# Patient Record
Sex: MALE | Race: WHITE | NOT HISPANIC OR LATINO | Employment: FULL TIME | URBAN - METROPOLITAN AREA
[De-identification: names, ages, dates, MRNs, and addresses within clinical notes are randomized per-mention and may not be internally consistent; named-entity substitution may affect disease eponyms.]

---

## 2019-06-04 ENCOUNTER — TRANSFERRED RECORDS (OUTPATIENT)
Dept: HEALTH INFORMATION MANAGEMENT | Facility: CLINIC | Age: 44
End: 2019-06-04

## 2020-02-23 ENCOUNTER — HEALTH MAINTENANCE LETTER (OUTPATIENT)
Age: 45
End: 2020-02-23

## 2020-12-12 ENCOUNTER — HEALTH MAINTENANCE LETTER (OUTPATIENT)
Age: 45
End: 2020-12-12

## 2021-04-07 ENCOUNTER — OFFICE VISIT (OUTPATIENT)
Dept: FAMILY MEDICINE | Facility: CLINIC | Age: 46
End: 2021-04-07
Payer: COMMERCIAL

## 2021-04-07 VITALS
RESPIRATION RATE: 18 BRPM | OXYGEN SATURATION: 100 % | WEIGHT: 230 LBS | HEIGHT: 72 IN | SYSTOLIC BLOOD PRESSURE: 121 MMHG | TEMPERATURE: 98 F | DIASTOLIC BLOOD PRESSURE: 77 MMHG | HEART RATE: 77 BPM | BODY MASS INDEX: 31.15 KG/M2

## 2021-04-07 DIAGNOSIS — Z11.4 SCREENING FOR HIV (HUMAN IMMUNODEFICIENCY VIRUS): ICD-10-CM

## 2021-04-07 DIAGNOSIS — E78.5 HYPERLIPIDEMIA WITH TARGET LDL LESS THAN 160: Primary | ICD-10-CM

## 2021-04-07 DIAGNOSIS — Z00.01 ENCOUNTER FOR ROUTINE ADULT PHYSICAL EXAM WITH ABNORMAL FINDINGS: ICD-10-CM

## 2021-04-07 DIAGNOSIS — Z11.59 ENCOUNTER FOR HEPATITIS C SCREENING TEST FOR LOW RISK PATIENT: ICD-10-CM

## 2021-04-07 DIAGNOSIS — Z12.11 SCREEN FOR COLON CANCER: ICD-10-CM

## 2021-04-07 DIAGNOSIS — F41.9 ANXIETY: ICD-10-CM

## 2021-04-07 PROBLEM — Z00.00 ROUTINE HISTORY AND PHYSICAL EXAMINATION OF ADULT: Status: ACTIVE | Noted: 2021-04-07

## 2021-04-07 LAB
ANION GAP SERPL CALCULATED.3IONS-SCNC: 7 MMOL/L (ref 3–14)
BUN SERPL-MCNC: 13 MG/DL (ref 7–30)
CALCIUM SERPL-MCNC: 9.1 MG/DL (ref 8.5–10.1)
CHLORIDE SERPL-SCNC: 104 MMOL/L (ref 94–109)
CHOLEST SERPL-MCNC: 259 MG/DL
CO2 SERPL-SCNC: 25 MMOL/L (ref 20–32)
CREAT SERPL-MCNC: 1.05 MG/DL (ref 0.66–1.25)
ERYTHROCYTE [DISTWIDTH] IN BLOOD BY AUTOMATED COUNT: 13.3 % (ref 10–15)
GFR SERPL CREATININE-BSD FRML MDRD: 84 ML/MIN/{1.73_M2}
GLUCOSE SERPL-MCNC: 92 MG/DL (ref 70–99)
HCT VFR BLD AUTO: 48.6 % (ref 40–53)
HDLC SERPL-MCNC: 63 MG/DL
HGB BLD-MCNC: 16.8 G/DL (ref 13.3–17.7)
LDLC SERPL CALC-MCNC: 177 MG/DL
MCH RBC QN AUTO: 32.6 PG (ref 26.5–33)
MCHC RBC AUTO-ENTMCNC: 34.6 G/DL (ref 31.5–36.5)
MCV RBC AUTO: 94 FL (ref 78–100)
NONHDLC SERPL-MCNC: 196 MG/DL
PLATELET # BLD AUTO: 144 10E9/L (ref 150–450)
POTASSIUM SERPL-SCNC: 4.3 MMOL/L (ref 3.4–5.3)
RBC # BLD AUTO: 5.15 10E12/L (ref 4.4–5.9)
SODIUM SERPL-SCNC: 136 MMOL/L (ref 133–144)
TRIGL SERPL-MCNC: 94 MG/DL
TSH SERPL DL<=0.005 MIU/L-ACNC: 0.81 MU/L (ref 0.4–4)
WBC # BLD AUTO: 5.4 10E9/L (ref 4–11)

## 2021-04-07 PROCEDURE — 36415 COLL VENOUS BLD VENIPUNCTURE: CPT | Performed by: FAMILY MEDICINE

## 2021-04-07 PROCEDURE — 84443 ASSAY THYROID STIM HORMONE: CPT | Performed by: FAMILY MEDICINE

## 2021-04-07 PROCEDURE — 86803 HEPATITIS C AB TEST: CPT | Performed by: FAMILY MEDICINE

## 2021-04-07 PROCEDURE — 80048 BASIC METABOLIC PNL TOTAL CA: CPT | Performed by: FAMILY MEDICINE

## 2021-04-07 PROCEDURE — 85027 COMPLETE CBC AUTOMATED: CPT | Performed by: FAMILY MEDICINE

## 2021-04-07 PROCEDURE — 87389 HIV-1 AG W/HIV-1&-2 AB AG IA: CPT | Performed by: FAMILY MEDICINE

## 2021-04-07 PROCEDURE — 99213 OFFICE O/P EST LOW 20 MIN: CPT | Mod: 25 | Performed by: FAMILY MEDICINE

## 2021-04-07 PROCEDURE — 80061 LIPID PANEL: CPT | Performed by: FAMILY MEDICINE

## 2021-04-07 PROCEDURE — 99386 PREV VISIT NEW AGE 40-64: CPT | Performed by: FAMILY MEDICINE

## 2021-04-07 SDOH — ECONOMIC STABILITY: FOOD INSECURITY: WITHIN THE PAST 12 MONTHS, YOU WORRIED THAT YOUR FOOD WOULD RUN OUT BEFORE YOU GOT MONEY TO BUY MORE.: NOT ASKED

## 2021-04-07 SDOH — ECONOMIC STABILITY: TRANSPORTATION INSECURITY
IN THE PAST 12 MONTHS, HAS THE LACK OF TRANSPORTATION KEPT YOU FROM MEDICAL APPOINTMENTS OR FROM GETTING MEDICATIONS?: NOT ASKED

## 2021-04-07 SDOH — ECONOMIC STABILITY: TRANSPORTATION INSECURITY
IN THE PAST 12 MONTHS, HAS LACK OF TRANSPORTATION KEPT YOU FROM MEETINGS, WORK, OR FROM GETTING THINGS NEEDED FOR DAILY LIVING?: NOT ASKED

## 2021-04-07 SDOH — ECONOMIC STABILITY: INCOME INSECURITY: HOW HARD IS IT FOR YOU TO PAY FOR THE VERY BASICS LIKE FOOD, HOUSING, MEDICAL CARE, AND HEATING?: NOT ASKED

## 2021-04-07 SDOH — ECONOMIC STABILITY: FOOD INSECURITY: WITHIN THE PAST 12 MONTHS, THE FOOD YOU BOUGHT JUST DIDN'T LAST AND YOU DIDN'T HAVE MONEY TO GET MORE.: NOT ASKED

## 2021-04-07 ASSESSMENT — MIFFLIN-ST. JEOR: SCORE: 1959.45

## 2021-04-07 ASSESSMENT — PAIN SCALES - GENERAL: PAINLEVEL: NO PAIN (0)

## 2021-04-07 NOTE — PATIENT INSTRUCTIONS
Patient Education     Treating Anxiety Disorders with Medicine  An anxiety disorder can make you feel nervous or apprehensive, even without a clear reason. In people age 65 and older, generalized anxiety disorder is one of the most commonly diagnosed anxiety disorders. Many times it occurs with depression. Certain anxiety disorders can cause intense feelings of fear or panic. You may even have physical symptoms such as a racing heartbeat, sweating, or dizziness. If you have these feelings, you don t have to suffer anymore. Treatment to help you overcome your fears will likely include therapy (also called counseling). Medicine may also be prescribed to help control your symptoms.     Medicines  Certain medicines may be prescribed to help control your symptoms. So you may feel less anxious. You may also feel able to move forward with therapy. At first, medicines and dosages may need to be adjusted to find what works best for you. Try to be patient. Tell your healthcare provider how a medicine makes you feel. This way, you can work together to find the treatment that s best for you. Keep in mind that medicines can have side effects. Talk with your provider about any side effects that are bothering you. Changing the dose or type of medicine may help. Don t stop taking medicine on your own. That can cause symptoms to come back or cause dangerous withdrawal symptoms.     Anti-anxiety medicine. This medicine eases symptoms and helps you relax. Your healthcare provider will explain when and how to use it. It may be prescribed for use before situations that make you anxious. You may also be told to take medicine on a regular schedule. Anti-anxiety medicine may make you feel a little sleepy or  out of it.  Don t drive a car or operate machinery while on this medicine, until you know how it affects you.  Never use alcohol or other drugs with anti-anxiety medicines. This could result in loss of muscular control, sedation,  coma, or death. Also, use only the amount of medicine prescribed for you. If you think you may have taken too much, get emergency care right away. Never share your medicines with others. Store these medicines in a safe place that can't be reached by children or visitors.   Keep taking medicines as prescribed  Never change your dosage, share or use another person's medicine, or stop taking your medicines without talking to your healthcare provider first. Keep the following in mind:     Some medicines must be taken on a schedule. Make this part of your daily routine. For instance, always take your pill before brushing your teeth. A pillbox can help you remember if you ve taken your medicine each day.    Medicines are often taken for 6 to 12 months. Your healthcare provider will then evaluate whether you need to stay on them. Many people who have also had therapy may no longer need medicine to manage anxiety.    You may need to stop taking medicine slowly to give your body time to adjust. When it s time to stop, your healthcare provider will tell you more. Remember: Never stop taking your medicine without talking to your provider first.    If symptoms return, you may need to start taking medicines again.  This isn t your fault. It s just the nature of your anxiety disorder.  What to think about    Side effects. Medicines may cause side effects. Ask your healthcare provider or pharmacist what you can expect. They may have ideas for avoiding some side effects.    Sexual problems. Some antidepressants can affect your desire for sex or your ability to have an orgasm. A change in dosage or medicine often solves the problem. If you have a sexual side effect that concerns you, tell your healthcare provider.    Addiction. If you ve never had a problem with drugs or alcohol, you may not have a problem with medicines used to treat anxiety disorders. But always discuss the medicines with your healthcare provider before taking  them. If you have a history of addiction, you may not be able to use certain medicines used to treat anxiety disorders.    Medicine interactions. Always check with your pharmacist before using any over-the-counter medicines (OTCs), including herbal supplements. Some OTCs may interact with your anti-anxiety medicines and increase or decrease their effectiveness.    [employer] last reviewed this educational content on 12/1/2019 2000-2020 [employer]. All rights reserved. This information is not intended as a substitute for professional medical care. Always follow your healthcare professional's instructions.           Patient Education     Anxiety Reaction  Anxiety is the feeling we all get when we think something bad might happen. It is a normal response to stress and normally causes only a mild reaction. When anxiety becomes more severe, it can interfere with daily life. In some cases, you may not even be aware of what you re anxious about. There may also be a genetic link. Or it may be a learned behavior in the home.   Both psychological and physical triggers cause stress reaction. It's often a response to fear or emotional stress, real or imagined. This stress may come from home, family, work, or social relationships.   During an anxiety reaction, you may feel:    Helpless    Nervous    Depressed    Grouchy  Your body may show signs of anxiety in many ways. You may experience:    Dry mouth    Shakiness    Dizziness    Weakness    Trouble breathing    Breathing fast (hyperventilating)    Chest pressure    Sweating    Headache    Nausea    Diarrhea    Tiredness    Inability to sleep    Sexual problems  Home care    Try to find the sources of stress in your life. They may not be obvious. These may include:  ? Daily hassles of life (such as traffic jams, missed appointments, or car troubles)  ? Major life changes, both good (new baby or job promotion) and bad (loss of job or loss of loved  one)  ? Overload (feeling that you have too many responsibilities and can't take care of all of them at once)  ? Feeling helpless or feeling that your problems can't be solved    Notice how your body reacts to stress. Learn to listen to your body signals. This will help you take action before the stress becomes severe.    When you can, do something about the source of your stress. (Avoid hassles, limit the amount of change that happens in your life at one time, and take a break when you feel overloaded).    Unfortunately, many stressful situations can't be avoided. It is necessary to learn how to better manage stress. There are many proven methods that will reduce your anxiety. These include simple things such as exercise, good nutrition, and adequate rest. Also, there are certain techniques that are helpful:  ? Relaxation  ? Breathing exercises  ? Visualization  ? Biofeedback  ? Meditation  For more information about this, talk with your healthcare provider. Or check online or at your local library or bookstore. You'll find many books and audiobooks on this subject.   Follow-up care  If you feel your anxiety is not responding to self-help measures, call your healthcare provider or make an appointment with a counselor. You may need short-term psychological counseling or medicine to help you manage stress.   Call 911  Call 911 if any of these happen:     Trouble breathing    Confusion    Drowsiness or trouble waking up    Fainting or loss of consciousness    Rapid heart rate    Seizure    New chest pain that becomes more severe, lasts longer, or spreads into your shoulder, arm, neck, jaw, or back  When to get medical advice  Call your healthcare provider right away if any of these happen:    Your symptoms get worse    Severe headache not eased by rest and mild pain reliever  [employer] last reviewed this educational content on 4/1/2020 2000-2020 [employer]. All rights reserved. This information is not  intended as a substitute for professional medical care. Always follow your healthcare professional's instructions.           Patient Education     Zoloft Oral Tablet 50 mg   Uses  This medicine is used for the following purposes:    anxiety    depression    eating disorders    obsessive compulsive disorder    post-traumatic stress disorder  Instructions  This medicine may be taken with or without food.  It is very important that you take the medicine at about the same time every day. It will work best if you do this.  Keep the medicine at room temperature. Avoid heat and direct light.  It is important that you keep taking each dose of this medicine on time even if you are feeling well.  If you forget to take a dose on time, take it as soon as you remember. If it is almost time for the next dose, do not take the missed dose. Return to your normal dosing schedule. Do not take 2 doses of this medicine at one time.  Please tell your doctor and pharmacist about all the medicines you take. Include both prescription and over-the-counter medicines. Also tell them about any vitamins, herbal medicines, or anything else you take for your health.  Do not suddenly stop taking this medicine. Check with your doctor before stopping.  Cautions  Tell your doctor and pharmacist if you ever had an allergic reaction to a medicine. Symptoms of an allergic reaction can include trouble breathing, skin rash, itching, swelling, or severe dizziness.  Do not use the medication any more than instructed.  Your ability to stay alert or to react quickly may be impaired by this medicine. Do not drive or operate machinery until you know how this medicine will affect you.  Please check with your doctor before drinking alcohol while on this medicine.  Family should check on the patient often. Call the doctor if patient becomes more depressed, has thoughts of suicide, or shows changes in behavior.  Call the doctor if there are any signs of confusion or  unusual changes in behavior.  Tell the doctor or pharmacist if you are pregnant, planning to be pregnant, or breastfeeding.  Ask your pharmacist if this medicine can interact with any of your other medicines. Be sure to tell them about all the medicines you take.  Do not start or stop any other medicines without first speaking to your doctor or pharmacist.  Call your doctor right away if you notice any unusual bleeding or bruising.  If you have painful erection or an erection for more than 4 hours, seek medical care right away.  Do not share this medicine with anyone who has not been prescribed this medicine.  This medicine can cause serious side effects in some patients. Important information from the [place] Food and Drug Administration (FDA) is available from your pharmacist. Please review it carefully with your pharmacist to understand the risks associated with this medicine.  Side Effects  The following is a list of some common side effects from this medicine. Please speak with your doctor about what you should do if you experience these or other side effects.    agitated feeling or trouble sleeping    decreased appetite    diarrhea    dizziness    drowsiness or sedation    dry mouth    nausea    stomach upset or abdominal pain    sweating    weight loss  Call your doctor or get medical help right away if you notice any of these more serious side effects:    loss of balance    coughing up blood or vomit that looks like coffee grounds    pain in the eye    fainting    hallucinations (unusual thoughts, seeing or hearing things that are not real)    fast or irregular heart beats    muscle weakness    dilation of the pupils    restlessness    problems with sexual functions or desire    shakiness    dark, tarry stool    blurring or changes of vision    severe or persistent vomiting    unexpected or extreme weight loss  A few people may have an allergic reactions to this medicine. Symptoms can include difficulty  breathing, skin rash, itching, swelling, or severe dizziness. If you notice any of these symptoms, seek medical help quickly.  Extra  Please speak with your doctor, nurse, or pharmacist if you have any questions about this medicine.  [email or web address]  IMPORTANT NOTE: This document tells you briefly how to take your medicine, but it does not tell you all there is to know about it.Your doctor or pharmacist may give you other documents about your medicine. Please talk to them if you have any questions.Always follow their advice. There is a more complete description of this medicine available in English.Scan this code on your smartphone or tablet or use the web address below. You can also ask your pharmacist for a printout. If you have any questions, please ask your pharmacist.     2021 [employer].

## 2021-04-07 NOTE — PROGRESS NOTES
SUBJECTIVE:   CC: Gagandeep Oswald is an 46 year old male who presents for preventative health visit.       Patient has been advised of split billing requirements and indicates understanding: Yes  Healthy Habits:    Getting at least 3 servings of Calcium per day:  Yes    Bi-annual eye exam:  Yes    Dental care twice a year:  Yes    Sleep apnea or symptoms of sleep apnea:  Excessive snoring    Diet:  Regular (no restrictions)    Frequency of exercise:  4-5 days/week    Duration of exercise:  30-45 minutes    Taking medications regularly:  Not Applicable    PHQ-2 Total Score:    Additional concerns today:  Yes (anxiety)    Today's PHQ-2 Score:   PHQ-2 (  [employer]) 2021   Q1: Little interest or pleasure in doing things 0   Q2: Feeling down, depressed or hopeless 0   PHQ-2 Score 0       Abuse: Current or Past(Physical, Sexual or Emotional)- No  Do you feel safe in your environment? Yes    Have you ever done Advance Care Planning? (For example, a Health Directive, POLST, or a discussion with a medical provider or your loved ones about your wishes): No, advance care planning information given to patient to review.  Patient declined advance care planning discussion at this time.    Social History     Tobacco Use     Smoking status: Former Smoker     Packs/day: 0.50     Years: 3.00     Pack years: 1.50     Types: Cigarettes     Quit date: 2005     Years since quittin.2     Smokeless tobacco: Never Used   Substance Use Topics     Alcohol use: Yes     Alcohol/week: 8.3 standard drinks     Comment: 5 drinks a week     If you drink alcohol do you typically have >3 drinks per day or >7 drinks per week? Yes      Alcohol Use 2021   Prescreen: >3 drinks/day or >7 drinks/week? Yes     AUDIT - Alcohol Use Disorders Identification Test - Reproduced from the World Health Organization Audit 2001 (Second Edition) 2021   1.  How often do you have a drink containing alcohol? 4 or more times a week   2.  How many  drinks containing alcohol do you have on a typical day when you are drinking? 1 or 2   3.  How often do you have five or more drinks on one occasion? Never   4.  How often during the last year have you found that you were not able to stop drinking once you had started? Never   5.  How often during the last year have you failed to do what was normally expected of you because of drinking? Never   6.  How often during the last year have you needed a first drink in the morning to get yourself going after a heavy drinking session? Never   7.  How often during the last year have you had a feeling of guilt or remorse after drinking? Never   8.  How often during the last year have you been unable to remember what happened the night before because of your drinking? Never   9.  Have you or someone else been injured because of your drinking? No   10. Has a relative, friend, doctor or other health care worker been concerned about your drinking or suggested you cut down? No   TOTAL SCORE 4       Last PSA: No results found for: PSA    Reviewed orders with patient. Reviewed health maintenance and updated orders accordingly - Yes  Lab work is in process  Labs reviewed in EPIC  BP Readings from Last 3 Encounters:   04/07/21 121/77   11/11/16 132/80   05/13/16 114/74    Wt Readings from Last 3 Encounters:   04/07/21 104.3 kg (230 lb)   11/11/16 100.7 kg (222 lb)   05/13/16 95.3 kg (210 lb)                  Patient Active Problem List   Diagnosis     Hyperlipidemia with target LDL less than 160     History of sinus surgery     Past Surgical History:   Procedure Laterality Date     ENDOSCOPIC SINUS SURGERY  1/24/2013    Procedure: ENDOSCOPIC SINUS SURGERY;  Bilateral total ethmoidectomy, bilateral endoscopic maxillary antrostomy;  Surgeon: Han Toledo MD;  Location: [facility] OR     MOUTH SURGERY  6/2012    [place] Teeth Removed     VASECTOMY         Social History     Tobacco Use     Smoking status: Former Smoker     Packs/day: 0.50      Years: 3.00     Pack years: 1.50     Types: Cigarettes     Quit date: 2005     Years since quittin.2     Smokeless tobacco: Never Used   Substance Use Topics     Alcohol use: Yes     Alcohol/week: 8.3 standard drinks     Comment: 5 drinks a week     Family History   Problem Relation Age of Onset     Allergies Mother      Arthritis Mother      Breast Cancer Mother 71     Diabetes Mother      Obesity Mother      Pancreatic Cancer Mother         d age 80     Obesity Brother      Diabetes Brother      C.A.D. Father 82        MI     Cancer Father         skin     Prostate Cancer Father      Kidney Cancer Father         d age 85     Colon Cancer No family hx of          Current Outpatient Medications   Medication Sig Dispense Refill     sertraline (ZOLOFT) 50 MG tablet 25 mg daily x 1 week and then 1 tablet daily 30 tablet 0     Multiple Vitamin (MULTIVITAMINS PO) Take  by mouth.       No Known Allergies    Reviewed and updated as needed this visit by clinical staff  Tobacco  Allergies   Problems               Abnormal Mood Symptoms  Onset/Duration: always bur worse 1 month  Description:   Depression (if yes, do PHQ-9): no  Anxiety (if yes, do JAZMINE-7): no  Accompanying Signs & Symptoms:  Still participating in activities that you used to enjoy: YES  Fatigue: no  Irritability: no  Difficulty concentrating: at times  Changes in appetite: no  Problems with sleep: no  Heart racing/beating fast: occasional when he gets panic attacks  Abnormally elevated, expansive, or irritable mood: no  Persistently increased activity or energy: no  Thoughts of hurting yourself or others: no  History:  Recent stress or major life event: no  Prior depression or anxiety: anxiety  Family history of depression or anxiety: no  Alcohol/drug use: 2 beers a day  Difficulty sleeping: no  Precipitating or alleviating factors: None  Therapies tried and outcome: none  No flowsheet data found.  No flowsheet data found.      Reviewed and  "updated as needed this visit by Provider     Problems            Past Medical History:   Diagnosis Date     Facial nerve palsy     LT congenital      Hyperlipidemia LDL goal < 160 5/7/2012     Nephrolithiasis     has had it 3 times-last one was in 2016     Varicocele     LT      Past Surgical History:   Procedure Laterality Date     ENDOSCOPIC SINUS SURGERY  1/24/2013    Procedure: ENDOSCOPIC SINUS SURGERY;  Bilateral total ethmoidectomy, bilateral endoscopic maxillary antrostomy;  Surgeon: Han Toledo MD;  Location: [facility] OR     MOUTH SURGERY  6/2012    [place] Teeth Removed     VASECTOMY         Review of Systems  CONSTITUTIONAL: NEGATIVE for fever, chills, change in weight  INTEGUMENTARY/SKIN: NEGATIVE for worrisome rashes, moles or lesions  EYES: NEGATIVE for vision changes or irritation  ENT: NEGATIVE for ear, mouth and throat problems  RESP: NEGATIVE for significant cough or SOB  CV: NEGATIVE for chest pain, palpitations or peripheral edema  GI: NEGATIVE for nausea, abdominal pain, heartburn, or change in bowel habits   male: negative for dysuria, hematuria, decreased urinary stream, erectile dysfunction, urethral discharge  MUSCULOSKELETAL: NEGATIVE for significant arthralgias or myalgia  NEURO: NEGATIVE for weakness, dizziness or paresthesias  PSYCHIATRIC: NEGATIVE for changes in mood or affect    OBJECTIVE:   /77   Pulse 77   Temp 98  F (36.7  C) (Oral)   Resp 18   Ht 1.834 m (6' 0.2\")   Wt 104.3 kg (230 lb)   SpO2 100%   BMI 31.02 kg/m      Physical Exam  GENERAL: healthy, alert and no distress  EYES: Eyes grossly normal to inspection, PERRL and conjunctivae and sclerae normal  HENT: ear canals and TM's normal, nose and mouth without ulcers or lesions  NECK: no adenopathy, no asymmetry, masses, or scars and thyroid normal to palpation  RESP: lungs clear to auscultation - no rales, rhonchi or wheezes  CV: regular rate and rhythm, normal S1 S2, no S3 or S4, no murmur, click or rub, no " peripheral edema and peripheral pulses strong  ABDOMEN: soft, nontender, no hepatosplenomegaly, no masses and bowel sounds normal  MS: no gross musculoskeletal defects noted, no edema  SKIN: no suspicious lesions or rashes  NEURO: Normal strength and tone, mentation intact and speech normal  PSYCH: mentation appears normal, affect normal/bright    Diagnostic Test Results:  Labs reviewed in Epic  Reviewed     ASSESSMENT/PLAN:   1. Encounter for routine adult physical exam with abnormal findings    - Lipid panel reflex to direct LDL Fasting    2. Hyperlipidemia with target LDL less than 160  Labs pending       3. Anxiety  Discussed Therapy vs meds  Referral done  SEE [facility] care orders  The potential side effects of this medication have been discussed with the patient.  Call if any significant problems with these are experienced.    - TSH with free T4 reflex  - CBC with platelets  - Basic metabolic panel  - MENTAL HEALTH REFERRAL  - Adult; Outpatient Treatment; Individual/Couples/Family/Group Therapy/Health Psychology; [facility] [place]: [facility] [phone]; We will contact you to schedule the appointment or please call with any questions  - sertraline (ZOLOFT) 50 MG tablet; 25 mg daily x 1 week and then 1 tablet daily  Dispense: 30 tablet; Refill: 0  Follow up 3 weeks  If suicidal or agitated stop meds  4. Screen for colon cancer  Advised colonoscopy vs FIT card   Pt to check with Insurance and let me Know  - Fecal colorectal cancer screen (FIT); Future    5. Encounter for hepatitis C screening test for low risk patient  Advised   - **Hepatitis C Screen Reflex to RNA FUTURE anytime    6. Screening for HIV (human immunodeficiency virus)  Advised   - HIV Antigen Antibody Combo    Patient has been advised of split billing requirements and indicates understanding: handouts  COUNSELING:   Reviewed preventive health counseling, as reflected in patient instructions       Consider AAA screening for ages 65-75 and  "smoking history       Regular exercise       Healthy diet/nutrition       Vision screening       Consider Hep C screening for all patients one time for ages 18-79 years       HIV screeninx in teen years, 1x in adult years, and at intervals if high risk       Colon cancer screening    Estimated body mass index is 31.02 kg/m  as calculated from the following:    Height as of this encounter: 1.834 m (6' 0.2\").    Weight as of this encounter: 104.3 kg (230 lb).     Weight management plan: Discussed healthy diet and exercise guidelines    He reports that he quit smoking about 16 years ago. His smoking use included cigarettes. He has a 1.50 pack-year smoking history. He has never used smokeless tobacco.      Counseling Resources:  ATP IV Guidelines  Pooled Cohorts Equation Calculator  FRAX Risk Assessment  ICSI Preventive Guidelines  Dietary Guidelines for Americans, 2010  USDA's MyPlate  ASA Prophylaxis  Lung CA Screening    Claudia Solo MD  [facility]  "

## 2021-04-07 NOTE — LETTER
April 9, 2021      Gagandeep Oswald  [address] [place] [address]        Dear ,    We are writing to inform you of your test results.    The 10-year ASCVD risk score (Oceansideal[employer] MCKENZIE Jr., et al., 2013) is: 2.6%. We have also included a  Low cholesterol diet     Resulted Orders   Lipid panel reflex to direct LDL Fasting   Result Value Ref Range    Cholesterol 259 (H) <200 mg/dL      Comment:      Desirable:       <200 mg/dl    Triglycerides 94 <150 mg/dL      Comment:      Non Fasting    HDL Cholesterol 63 >39 mg/dL    LDL Cholesterol Calculated 177 (H) <100 mg/dL      Comment:      Above desirable:  100-129 mg/dl  Borderline High:  130-159 mg/dL  High:             160-189 mg/dL  Very high:       >189 mg/dl      Non HDL Cholesterol 196 (H) <130 mg/dL      Comment:      Above Desirable:  130-159 mg/dl  Borderline high:  160-189 mg/dl  High:             190-219 mg/dl  Very high:       >219 mg/dl     TSH with free T4 reflex   Result Value Ref Range    TSH 0.81 0.40 - 4.00 mU/L   CBC with platelets   Result Value Ref Range    WBC 5.4 4.0 - 11.0 10e9/L    RBC Count 5.15 4.4 - 5.9 10e12/L    Hemoglobin 16.8 13.3 - 17.7 g/dL    Hematocrit 48.6 40.0 - 53.0 %    MCV 94 78 - 100 fl    MCH 32.6 26.5 - 33.0 pg    MCHC 34.6 31.5 - 36.5 g/dL    RDW 13.3 10.0 - 15.0 %    Platelet Count 144 (L) 150 - 450 10e9/L   Basic metabolic panel   Result Value Ref Range    Sodium 136 133 - 144 mmol/L    Potassium 4.3 3.4 - 5.3 mmol/L    Chloride 104 94 - 109 mmol/L    Carbon Dioxide 25 20 - 32 mmol/L    Anion Gap 7 3 - 14 mmol/L    Glucose 92 70 - 99 mg/dL      Comment:      Non Fasting    Urea Nitrogen 13 7 - 30 mg/dL    Creatinine 1.05 0.66 - 1.25 mg/dL    GFR Estimate 84 >60 mL/min/[1.73_m2]      Comment:      Non  GFR Calc  Starting 12/18/2018, serum creatinine based estimated GFR (eGFR) will be   calculated using the Chronic Kidney Disease Epidemiology Collaboration   (CKD-EPI) equation.      GFR  Estimate If Black >90 >60 mL/min/[1.73_m2]      Comment:       GFR Calc  Starting 12/18/2018, serum creatinine based estimated GFR (eGFR) will be   calculated using the Chronic Kidney Disease Epidemiology Collaboration   (CKD-EPI) equation.      Calcium 9.1 8.5 - 10.1 mg/dL       If you have any questions or concerns, please call the clinic at the number listed above.       Sincerely,      Claudia Solo MD

## 2021-04-08 LAB
HCV AB SERPL QL IA: NONREACTIVE
HIV 1+2 AB+HIV1 P24 AG SERPL QL IA: NONREACTIVE

## 2021-04-16 DIAGNOSIS — Z12.11 SCREEN FOR COLON CANCER: ICD-10-CM

## 2021-04-16 PROCEDURE — 82274 ASSAY TEST FOR BLOOD FECAL: CPT | Performed by: FAMILY MEDICINE

## 2021-04-20 LAB — HEMOCCULT STL QL IA: NEGATIVE

## 2021-05-17 ENCOUNTER — OFFICE VISIT (OUTPATIENT)
Dept: FAMILY MEDICINE | Facility: CLINIC | Age: 46
End: 2021-05-17
Payer: COMMERCIAL

## 2021-05-17 ENCOUNTER — MYC MEDICAL ADVICE (OUTPATIENT)
Dept: FAMILY MEDICINE | Facility: CLINIC | Age: 46
End: 2021-05-17

## 2021-05-17 VITALS
TEMPERATURE: 98.9 F | WEIGHT: 231 LBS | SYSTOLIC BLOOD PRESSURE: 104 MMHG | HEIGHT: 72 IN | BODY MASS INDEX: 31.29 KG/M2 | DIASTOLIC BLOOD PRESSURE: 62 MMHG | OXYGEN SATURATION: 99 % | HEART RATE: 94 BPM

## 2021-05-17 DIAGNOSIS — I48.91 NEW ONSET ATRIAL FIBRILLATION (H): Primary | ICD-10-CM

## 2021-05-17 LAB
ANION GAP SERPL CALCULATED.3IONS-SCNC: 6 MMOL/L (ref 3–14)
BUN SERPL-MCNC: 14 MG/DL (ref 7–30)
CALCIUM SERPL-MCNC: 8.6 MG/DL (ref 8.5–10.1)
CHLORIDE SERPL-SCNC: 111 MMOL/L (ref 94–109)
CO2 SERPL-SCNC: 23 MMOL/L (ref 20–32)
CREAT SERPL-MCNC: 1.07 MG/DL (ref 0.66–1.25)
ERYTHROCYTE [DISTWIDTH] IN BLOOD BY AUTOMATED COUNT: 13.1 % (ref 10–15)
GFR SERPL CREATININE-BSD FRML MDRD: 82 ML/MIN/{1.73_M2}
GLUCOSE SERPL-MCNC: 110 MG/DL (ref 70–99)
HCT VFR BLD AUTO: 47.6 % (ref 40–53)
HGB BLD-MCNC: 16.8 G/DL (ref 13.3–17.7)
MCH RBC QN AUTO: 33.1 PG (ref 26.5–33)
MCHC RBC AUTO-ENTMCNC: 35.3 G/DL (ref 31.5–36.5)
MCV RBC AUTO: 94 FL (ref 78–100)
PLATELET # BLD AUTO: 171 10E9/L (ref 150–450)
POTASSIUM SERPL-SCNC: 4.4 MMOL/L (ref 3.4–5.3)
RBC # BLD AUTO: 5.08 10E12/L (ref 4.4–5.9)
SODIUM SERPL-SCNC: 140 MMOL/L (ref 133–144)
TSH SERPL DL<=0.005 MIU/L-ACNC: 0.87 MU/L (ref 0.4–4)
WBC # BLD AUTO: 7.9 10E9/L (ref 4–11)

## 2021-05-17 PROCEDURE — 99214 OFFICE O/P EST MOD 30 MIN: CPT | Performed by: FAMILY MEDICINE

## 2021-05-17 PROCEDURE — 80048 BASIC METABOLIC PNL TOTAL CA: CPT | Performed by: FAMILY MEDICINE

## 2021-05-17 PROCEDURE — 85027 COMPLETE CBC AUTOMATED: CPT | Performed by: FAMILY MEDICINE

## 2021-05-17 PROCEDURE — 84443 ASSAY THYROID STIM HORMONE: CPT | Performed by: FAMILY MEDICINE

## 2021-05-17 PROCEDURE — 36415 COLL VENOUS BLD VENIPUNCTURE: CPT | Performed by: FAMILY MEDICINE

## 2021-05-17 PROCEDURE — 93000 ELECTROCARDIOGRAM COMPLETE: CPT | Performed by: FAMILY MEDICINE

## 2021-05-17 RX ORDER — METOPROLOL TARTRATE 25 MG/1
12.5 TABLET, FILM COATED ORAL 2 TIMES DAILY
Qty: 60 TABLET | Refills: 0 | Status: SHIPPED | OUTPATIENT
Start: 2021-05-17 | End: 2021-05-24

## 2021-05-17 ASSESSMENT — ANXIETY QUESTIONNAIRES
6. BECOMING EASILY ANNOYED OR IRRITABLE: NOT AT ALL
IF YOU CHECKED OFF ANY PROBLEMS ON THIS QUESTIONNAIRE, HOW DIFFICULT HAVE THESE PROBLEMS MADE IT FOR YOU TO DO YOUR WORK, TAKE CARE OF THINGS AT HOME, OR GET ALONG WITH OTHER PEOPLE: NOT DIFFICULT AT ALL
1. FEELING NERVOUS, ANXIOUS, OR ON EDGE: NOT AT ALL
7. FEELING AFRAID AS IF SOMETHING AWFUL MIGHT HAPPEN: NOT AT ALL
GAD7 TOTAL SCORE: 0
3. WORRYING TOO MUCH ABOUT DIFFERENT THINGS: NOT AT ALL
5. BEING SO RESTLESS THAT IT IS HARD TO SIT STILL: NOT AT ALL
2. NOT BEING ABLE TO STOP OR CONTROL WORRYING: NOT AT ALL

## 2021-05-17 ASSESSMENT — MIFFLIN-ST. JEOR: SCORE: 1960.81

## 2021-05-17 ASSESSMENT — PATIENT HEALTH QUESTIONNAIRE - PHQ9: 5. POOR APPETITE OR OVEREATING: NOT AT ALL

## 2021-05-17 NOTE — PROGRESS NOTES
Assessment & Plan     New onset atrial fibrillation (H)  Pending labs  - CBC with platelets  - Basic metabolic panel  - TSH with free T4 reflex  - CARDIOLOGY EVAL ADULT REFERRAL; Future  - Echocardiogram Complete; Future  Make appointment for Echo and Cardiology  - metoprolol tartrate (LOPRESSOR) 25 MG tablet; Take 0.5 tablets (12.5 mg) by mouth 2 times daily    If any worsening symptoms-Go to ER    No follow-ups on file.    Claudia Solo MD  [facility] JEFFREY Donis is a 46 year old who presents for the following health issues    Concern - Heart Flutter-used apple watch  Which showed atrial Fib  Pt  Has  had palpitations and Feels His Heart skips a Beat-This is Off   And on for several years  Pt has Noticed this for several years but previous EKG from 2019 showed Sinus Rhythm  Onset: woke this morning with this  Description: Heart Flutter  Intensity: moderate  Progression of Symptoms:  Bouncing around  Accompanying Signs & Symptoms: Anxiety and Panic Attacks  Previous history of similar problem: Yes  Precipitating factors:        Worsened by: none  Alleviating factors:        Improved by: none  Therapies tried and outcome: Breathing  Feels Like he gets panic attacks  No chest pain  No sob  Has Lost some weight with exercise  2 sisters have atrial fibrillation and Mom has atrial Fib      Review of Systems   CONSTITUTIONAL: NEGATIVE for fever, chills, change in weight  ENT/MOUTH: NEGATIVE for ear, mouth and throat problems  RESP: NEGATIVE for significant cough or SOB  CV: as above  MUSCULOSKELETAL: NEGATIVE for significant arthralgias or myalgia  NEURO: NEGATIVE for weakness, dizziness or paresthesias  ROS otherwise negative  Rest of the ROS is Negative except see above and Problem list [stable]        Objective    /62 (BP Location: Right arm, Patient Position: Chair, Cuff Size: Adult Large)   Pulse 94   Temp 98.9  F (37.2  C) (Oral)   Ht 1.829 m (6')   Wt 104.8 kg  (231 lb)   SpO2 99%   BMI 31.33 kg/m    Body mass index is 31.33 kg/m .  Physical Exam   GENERAL: healthy, alert and no distress  NECK: no adenopathy, no asymmetry, masses, or scars and thyroid normal to palpation  RESP: lungs clear to auscultation - no rales, rhonchi or wheezes  CV: regular rates and rhythm, no murmur, click or rub, peripheral pulses strong and no peripheral edema  ABDOMEN: soft, nontender, no hepatosplenomegaly, no masses and bowel sounds normal  MS: no gross musculoskeletal defects noted, no edema  SKIN: no suspicious lesions or rashes  PSYCH: mentation appears normal, affect normal/bright    Pending

## 2021-05-17 NOTE — PATIENT INSTRUCTIONS
Patient Education     Discharge Instructions for Atrial Fibrillation  You have been diagnosed with an abnormal heart rhythm called atrial fibrillation (AFib). This means your heart s 2 upper chambers quiver rather than squeeze the blood out in a normal pattern. This leads to an irregular and sometimes rapid heartbeat. Some people will have symptoms such as a flip-flopping heartbeat, chest pain, lightheadedness, or shortness of breath. Other people may have no symptoms at all. AFib is serious because it affects the heart s ability to fill with blood as it should. Blood clots may form. This increases the risk for stroke. Untreated, it can also lead to heart failure. AFib can be controlled. With treatment, most people lead normal lives.  Treatment options  Treatment for AFib depends on your age, symptoms, how long you have had it, and other factors. You will have a complete evaluation to find out if you have any abnormalities that caused your heart to go into AFib. This might be blocked heart arteries, heart valve problems, or a thyroid problem. Your doctor will assess your case and discuss choices with you.  Treatment choices may include:    Treating an underlying disorder that puts you at risk for atrial fibrillation. For example, correcting an abnormal thyroid or electrolyte problem, or treating a blocked heart artery.    Restoring a normal heart rhythm with an electrical shock (cardioversion) or with an antiarrhythmic medicine (chemical cardioversion)    Using medicine to control your heart rate    Preventing the risk for blood clot and stroke using blood-thinning medicines, such as aspirin or clopidogrel.    Preventing the risk of blood clot and stroke with procedures such as left atrial appendage closure. In this procedure, a small pouch in the top of your atrium where blood clots often form is blocked off. It can prevent blood clots and reduce stroke risk without the need for lifelong blood thinner  (anticoagulants).    Doing catheter ablation or a surgical maze procedure. These procedures use different methods to create scar tissue in certain areas of heart. This interrupts the abnormal electrical signals that cause AFib. This may be an option when medicines don't work, or instead of long-term medicine.    Other treatment choices may be recommended for you by your doctor.  Managing risk factors for stroke and preventing heart failure are important parts of any treatment plan for AFib.  Home care    Take your medicines exactly as directed. Don t skip doses.    Work with your doctor to find the right medicines and doses for you.    Learn to take your own pulse. Keep a record of your results. Ask your doctor which pulse rates mean that you need medical attention. Slowing your pulse is often the goal of treatment. Ask your doctor if it s OK for you to use an automatic machine to check your pulse at home. Sometimes these machines don t count the pulse correctly with AFib.    Limit your intake of coffee, tea, cola, and other drinks with caffeine. Talk with your doctor about whether you should cut out caffeine.    Don't take over-the-counter medicines that have caffeine in them. Also avoid medicines with pseudoephedrine.    Let your doctor know what medicines you take, including prescription and over-the-counter medicines, as well as any supplements. They interfere with some medicines given for AFib.    Ask your doctor about whether you can drink alcohol. Some people need to avoid alcohol to better treat AFib. If you are taking blood-thinner medicines, alcohol may interfere with them by increasing their effect.    Never take stimulants such as amphetamines or cocaine. These drugs can speed up your heart rate and trigger AFib.    Manage your weight. If you are above your ideal body weight, losing excess pounds (kilograms) can reduce your incidence of AFib.    Follow-up care  Follow up with your doctor, or as  advised.  When to call your healthcare provider  Call your healthcare provider right away if you have any of the following:    Weakness    Dizziness    Fainting    Fatigue    Shortness of breath    Chest pain with increased activity    A change in the usual regularity of your heartbeat, or an unusually fast heartbeat  [employer] last reviewed this educational content on 5/1/2019 2000-2021 [employer]. All rights reserved. This information is not intended as a substitute for professional medical care. Always follow your healthcare professional's instructions.           Patient Education     Understanding Atrial Fibrillation     An arrhythmia is any problem with the speed or pattern of the heartbeat. Atrial fibrillation (AFib) is the most common type of arrhythmia. It causes fast, chaotic electrical signals in the atria. This makes it hard for the heart to work as it should. It also affects how much blood your heart can pump out to the body.  AFib may occur once in a while and go away on its own. Or it may continue for longer periods and need treatment.  AFib can lead to serious problems, such as stroke. Your healthcare provider will need to monitor and manage it.    What happens during atrial fibrillation?   The heart has an electrical system that sends signals to control the heartbeat. As signals move through the heart, they tell the heart s upper chambers (atria) and lower chambers (ventricles) when to squeeze (contract) and relax. This lets blood move through the heart and out to the body and lungs.  With AFib, the atria receive abnormal signals. This causes them to contract in a fast and irregular way, and out of sync with the ventricles. When this happens, the atria also have a harder time moving blood into the ventricles. Blood may then pool in the atria. This increases the risk for blood clots and stroke. The ventricles also may contract too quickly and irregularly. As a result, they may not  pump blood to the body and lungs as well as they should. This can weaken the heart muscle over time and cause heart failure.  What causes atrial fibrillation?  AFib is more common in older adults. It has many possible causes:    Coronary artery disease    Heart valve disease    Heart attack    Heart surgery    High blood pressure    Thyroid disease    Diabetes    Lung disease    Sleep apnea    Heavy alcohol use  In some cases of AFib, doctors don't know the cause.  What are the symptoms of atrial fibrillation?  AFib may not cause symptoms. If symptoms do occur, they may include:    A fast, pounding, irregular heartbeat    Shortness of breath    Tiredness    Dizziness or fainting    Chest pain  How is atrial fibrillation treated?  Treatments for AFib can include any of the options below.    Medicines. You may be prescribed:  ? Heart rate medicines to help slow down the heartbeat  ? Heart rhythm medicines to help the heart beat more regularly  ? Blood thinners or anti-clotting medicines to help reduce the risk for blood clots and stroke.    Left atrial appendage closure. Your healthcare provider may advise this device to prevent stroke. You may need if you are at high risk for stroke but have problems taking blood-thinner (anticoagulant) medicines. The device is placed in the part of the heart where most clots form. This area is called the left atrial appendage (CARLOS). It's a pouch-like structure in the muscle wall of the left atrium. The device closes off the CARLOS to prevent clots moving from the heart to the brain and causing a stroke.    Electrical cardioversion. Your healthcare provider uses special pads or paddles to send one or more brief electrical shocks to the heart. This can help reset the heartbeat to normal.    Ablation. Long, thin tubes (catheters) are threaded through a blood vessel to the heart. There, the catheters send out hot or cold energy to the areas causing the abnormal signals. This energy  destroys the problem tissue or cells. This improves the chances that your heart will stay in normal rhythm without using medicines. If your heart rate and rhythm can t be controlled, you may need ablation and a pacemaker. These will help control the heart rate and regularity of the heartbeat.    Surgery. During surgery, your healthcare provider may use different methods to create scar tissue in the areas of the heart causing the abnormal signals. The scar tissue disrupts the abnormal signals and may stop AFib from occurring.    Hybrid surgical-catheter ablation for AFib. This treatment is used for people with AFib that continues or is hard to treat.. It combines surgery with a catheter ablation. During the surgery, the surgeon makes small cuts (incisions) between the ribs in the chest or in the abdomen near the sternum. The surgeon puts a scope through the incisions to get to the backside of the heart. The catheter portion of the procedure is done by putting a catheter into a vein in the groin. The catheter is guided to the inside of the heart. Using the catheter, radiofrequency ablation is done to destroy the tissue inside the heart that is causing the AFib. Using both of these approaches may work better to block the abnormal electrical signals and be a more permanent treatment for persistent AFib.  What are possible complications of atrial fibrillation?  Complications can include:    Blood clots    Stroke    Heart failure. This problem occurs when the heart muscle weakens so much that it can no longer pump blood well.  When should I call my healthcare provider?  Call your healthcare provider right away if you have any of these:    Symptoms that don t get better with treatment, or get worse    New symptoms  [employer] last reviewed this educational content on 4/1/2019 2000-2021 [employer]. All rights reserved. This information is not intended as a substitute for professional medical care. Always  follow your healthcare professional's instructions.

## 2021-05-18 ASSESSMENT — ANXIETY QUESTIONNAIRES: GAD7 TOTAL SCORE: 0

## 2021-05-21 ENCOUNTER — HOSPITAL ENCOUNTER (OUTPATIENT)
Dept: CARDIOLOGY | Facility: CLINIC | Age: 46
Discharge: HOME | End: 2021-05-21
Attending: FAMILY MEDICINE | Admitting: FAMILY MEDICINE
Payer: COMMERCIAL

## 2021-05-21 DIAGNOSIS — I48.91 NEW ONSET ATRIAL FIBRILLATION (H): ICD-10-CM

## 2021-05-21 PROCEDURE — 255N000002 HC RX 255 OP 636: Performed by: FAMILY MEDICINE

## 2021-05-21 PROCEDURE — 999N000208 ECHOCARDIOGRAM COMPLETE

## 2021-05-21 PROCEDURE — 93306 TTE W/DOPPLER COMPLETE: CPT | Mod: 26 | Performed by: INTERNAL MEDICINE

## 2021-05-21 RX ADMIN — HUMAN ALBUMIN MICROSPHERES AND PERFLUTREN 9 ML: 10; .22 INJECTION, SOLUTION INTRAVENOUS at 13:28

## 2021-05-24 ENCOUNTER — OFFICE VISIT (OUTPATIENT)
Dept: CARDIOLOGY | Facility: CLINIC | Age: 46
End: 2021-05-24
Payer: COMMERCIAL

## 2021-05-24 ENCOUNTER — HOSPITAL ENCOUNTER (OUTPATIENT)
Facility: CLINIC | Age: 46
End: 2021-05-24
Payer: COMMERCIAL

## 2021-05-24 VITALS
BODY MASS INDEX: 31.19 KG/M2 | WEIGHT: 230 LBS | DIASTOLIC BLOOD PRESSURE: 77 MMHG | HEART RATE: 115 BPM | OXYGEN SATURATION: 96 % | SYSTOLIC BLOOD PRESSURE: 113 MMHG

## 2021-05-24 DIAGNOSIS — I48.91 NEW ONSET ATRIAL FIBRILLATION (H): Primary | ICD-10-CM

## 2021-05-24 DIAGNOSIS — Z11.52 ENCOUNTER FOR PREPROCEDURE SCREENING LABORATORY TESTING FOR COVID-19: ICD-10-CM

## 2021-05-24 DIAGNOSIS — Z01.812 ENCOUNTER FOR PREPROCEDURE SCREENING LABORATORY TESTING FOR COVID-19: ICD-10-CM

## 2021-05-24 PROCEDURE — 99204 OFFICE O/P NEW MOD 45 MIN: CPT | Mod: GC | Performed by: INTERNAL MEDICINE

## 2021-05-24 RX ORDER — ASPIRIN 81 MG/1
81 TABLET ORAL DAILY
COMMUNITY
End: 2021-05-24

## 2021-05-24 RX ORDER — POTASSIUM CHLORIDE 1500 MG/1
40 TABLET, EXTENDED RELEASE ORAL
Status: CANCELLED | OUTPATIENT
Start: 2021-05-24

## 2021-05-24 RX ORDER — LIDOCAINE 40 MG/G
CREAM TOPICAL
Status: CANCELLED | OUTPATIENT
Start: 2021-05-24

## 2021-05-24 RX ORDER — MAGNESIUM SULFATE HEPTAHYDRATE 40 MG/ML
2 INJECTION, SOLUTION INTRAVENOUS
Status: CANCELLED | OUTPATIENT
Start: 2021-05-24

## 2021-05-24 RX ORDER — POTASSIUM CHLORIDE 1500 MG/1
20 TABLET, EXTENDED RELEASE ORAL
Status: CANCELLED | OUTPATIENT
Start: 2021-05-24

## 2021-05-24 NOTE — PROGRESS NOTES
HPI: Reason for consultation: New onset atrial fibrillation    Mr Gagandeep Oswald is a 45yo M with little past medical history (HLD, anxiety) who presents for the evaluation of atrial fibrillation. He does not have a history of HTN, DM2, CAD/MI, CVA, PAD, or CHF.     He notes that on 5/17 he awoke with new palpitations. He had sensation of fluttering in his chest that was associated with mild shortness of breath, fatigue/exertional intolerance, and lightheadedness when standing. He was evaluated by his PCP who found him to be in atrial fibrillation with ventricular rates in the 120s. He was started on metoprolol 12.5mg BID and daily ASA. An echocardiogram was obtained showing normal LV function and no valvular pathology.     He presents today for evaluation and management of the atrial fibrillation. He is still in Afib on his arrival and feels that he has been in it constantly since onset 1 week ago. Looking back, he feels that he has had similar episodes dating back years that just didn't last quite so long. He continues to have a fatigue and a reduced exertional capacity, and feels a fluttering sensation in the chest.    He has a strong family history of atrial fibrillation, and notes that both of his parents and 5 of his siblings have it. He does not drink much caffeine (1 soda a day or a decaf coffee). He is a former smoker, and drinks about 2 alcoholic beverages daily. He does not have a physical fitness routine and has been sedentary for the past year or so.    PAST MEDICAL HISTORY:  Past Medical History:   Diagnosis Date     Facial nerve palsy     LT congenital      Hyperlipidemia LDL goal < 160 5/7/2012     Nephrolithiasis     has had it 3 times-last one was in 2016     New onset atrial fibrillation (H) 5/17/2021     Varicocele     LT       CURRENT MEDICATIONS:  Current Outpatient Medications   Medication Sig Dispense Refill     aspirin 81 MG EC tablet Take 81 mg by mouth daily       metoprolol tartrate  (LOPRESSOR) 25 MG tablet Take 0.5 tablets (12.5 mg) by mouth 2 times daily 60 tablet 0     Multiple Vitamin (MULTIVITAMINS PO) Take  by mouth.         PAST SURGICAL HISTORY:  Past Surgical History:   Procedure Laterality Date     ENDOSCOPIC SINUS SURGERY  2013    Procedure: ENDOSCOPIC SINUS SURGERY;  Bilateral total ethmoidectomy, bilateral endoscopic maxillary antrostomy;  Surgeon: Han Toledo MD;  Location: [facility] OR     MOUTH SURGERY  2012    Blanchard Teeth Removed     VASECTOMY         ALLERGIES:   No Known Allergies    FAMILY HISTORY:  - Premature coronary artery disease  + Atrial fibrillation  - Sudden cardiac death     SOCIAL HISTORY:  Social History     Tobacco Use     Smoking status: Former Smoker     Packs/day: 0.50     Years: 3.00     Pack years: 1.50     Types: Cigarettes     Quit date: 2005     Years since quittin.4     Smokeless tobacco: Never Used   Substance Use Topics     Alcohol use: Yes     Alcohol/week: 8.3 standard drinks     Comment: 5 drinks a week     Drug use: No       ROS:    ROS: 10 point ROS neg other than the symptoms noted above in the HPI.    Exam:  /77 (BP Location: Right arm, Patient Position: Chair, Cuff Size: Adult Large)   Pulse 115   Wt 104.3 kg (230 lb)   SpO2 96%   BMI 31.19 kg/m    GENERAL APPEARANCE: healthy, alert and no distress  HEENT: no icterus, no xanthelasmas, normal pupil size  NECK: no asymmetry, masses, or scars, no bruits, JVP not elevated  RESPIRATORY: lungs clear to auscultation - no rales, rhonchi or wheezes, no use of accessory muscles, no retractions, respirations are unlabored, normal respiratory rate  CARDIOVASCULAR: irregularly irregular rhythm, normal S1 with physiologic split S2, no S3 or S4 and no murmur, click or rub  ABDOMEN: soft, non tender, bowel sounds normal  EXTREMITIES: peripheral pulses normal, no edema  NEURO: alert and oriented to person/place/time, normal speech, gait and affect  VASC: Radial pulses are normal  in volumes and symmetric bilaterally.  SKIN: no ecchymoses, no rashes    Labs:  CBC RESULTS:   Lab Results   Component Value Date    WBC 7.9 05/17/2021    RBC 5.08 05/17/2021    HGB 16.8 05/17/2021    HCT 47.6 05/17/2021    MCV 94 05/17/2021    MCH 33.1 (H) 05/17/2021    MCHC 35.3 05/17/2021    RDW 13.1 05/17/2021     05/17/2021       BMP RESULTS:  Lab Results   Component Value Date     05/17/2021    POTASSIUM 4.4 05/17/2021    CHLORIDE 111 (H) 05/17/2021    CO2 23 05/17/2021    ANIONGAP 6 05/17/2021     (H) 05/17/2021    BUN 14 05/17/2021    CR 1.07 05/17/2021    GFRESTIMATED 82 05/17/2021    GFRESTBLACK >90 05/17/2021    BOYD 8.6 05/17/2021        INR RESULTS:  Lab Results   Component Value Date    INR 1.10 01/16/2013     Results for KARINA MOHAMUD (MRN [id number]) as of 5/24/2021 16:28   Ref. Range 5/17/2021 13:07   TSH Latest Ref Range: 0.40 - 4.00 mU/L 0.87     Procedures:  Echocardiogram  Interpretation Summary     The rhythm was rapid atrial fibrillation.  BP: 131/81 mmHg.  The left ventricle is normal in size.  The visual ejection fraction is estimated at 60-65%.  The right ventricle is normal in size and function.  Normal atrial size.  Trace tricuspid and mitral valve regurgitation.  Dilated of the inferior vena cava.     There is no comparison study available.      Assessment and Plan:   Paroxysmal Atrial Fibrillation  [id number]    He has significant symptoms attributable to atrial fibrillation, and remains in atrial fibrillation at the time of our encounter. After a discussion of risks and benefits and potential treatment options, we have elected to proceed with a DCCV. We will start him on NOAC today and arrange for ACACIA and DCCV later this week. He will need to remain on an anticoagulant for 4 weeks following cardioversion. In the interim, he should continue metoprolol for rate control.    - start apixaban 5mg BID  - will arrange for ACACIA and DCCV later this week  - continue  metoprolol 12.5mg BID in the interim (this can be stopped after DCCV if successfully returned to sinus rhythm)    Follow-up to be determined at time of DCCV, likely 6 months.    Patient was seen and examined with the attending cardiologist, Dr. Wright, who is in agreement with the assessment and plan.    Bladimir King MD  Cardiology Fellow    I have seen, interviewed, and examined patient. I have reviewed the laboratory tests, imaging, and other investigations. I have reviewed the management plan with the patient. I discussed with the team and agree with the findings and plan in this resident/fellow/nurse practitioner's note. In addition, changes in the physical examination, assessment and plan have been incorporated into the note by myself, as to make it a single cohesive document.       Darcie Wright MD, MS  Cardiology/Cardiac EP Attending Staff        CC  Patient Care Team:  Ezra Del Real MD as PCP - General (Family Practice)  Ezra Del Real MD as Assigned PCP  EZRA DEL REAL

## 2021-05-24 NOTE — LETTER
5/24/2021      RE: Gagandeep Oswald  [address] [place] [address]       Dear Colleague,    Thank you for the opportunity to participate in the care of your patient, Gagandeep Oswald, at the [facility] at [facility]. Please see a copy of my visit note below.    HPI: Reason for consultation: New onset atrial fibrillation    Mr Gagandeep Oswald is a 45yo M with little past medical history (HLD, anxiety) who presents for the evaluation of atrial fibrillation. He does not have a history of HTN, DM2, CAD/MI, CVA, PAD, or CHF.     He notes that on 5/17 he awoke with new palpitations. He had sensation of fluttering in his chest that was associated with mild shortness of breath, fatigue/exertional intolerance, and lightheadedness when standing. He was evaluated by his PCP who found him to be in atrial fibrillation with ventricular rates in the 120s. He was started on metoprolol 12.5mg BID and daily ASA. An echocardiogram was obtained showing normal LV function and no valvular pathology.     He presents today for evaluation and management of the atrial fibrillation. He is still in Afib on his arrival and feels that he has been in it constantly since onset 1 week ago. Looking back, he feels that he has had similar episodes dating back years that just didn't last quite so long. He continues to have a fatigue and a reduced exertional capacity, and feels a fluttering sensation in the chest.    He has a strong family history of atrial fibrillation, and notes that both of his parents and 5 of his siblings have it. He does not drink much caffeine (1 soda a day or a decaf coffee). He is a former smoker, and drinks about 2 alcoholic beverages daily. He does not have a physical fitness routine and has been sedentary for the past year or so.    PAST MEDICAL HISTORY:  Past Medical History:   Diagnosis Date     Facial nerve palsy     LT  congenital      Hyperlipidemia LDL goal < 160 2012     Nephrolithiasis     has had it 3 times-last one was in 2016     New onset atrial fibrillation (H) 2021     Varicocele     LT       CURRENT MEDICATIONS:  Current Outpatient Medications   Medication Sig Dispense Refill     aspirin 81 MG EC tablet Take 81 mg by mouth daily       metoprolol tartrate (LOPRESSOR) 25 MG tablet Take 0.5 tablets (12.5 mg) by mouth 2 times daily 60 tablet 0     Multiple Vitamin (MULTIVITAMINS PO) Take  by mouth.         PAST SURGICAL HISTORY:  Past Surgical History:   Procedure Laterality Date     ENDOSCOPIC SINUS SURGERY  2013    Procedure: ENDOSCOPIC SINUS SURGERY;  Bilateral total ethmoidectomy, bilateral endoscopic maxillary antrostomy;  Surgeon: Han Toledo MD;  Location: [facility] OR     MOUTH SURGERY  2012    [place] Teeth Removed     VASECTOMY         ALLERGIES:   No Known Allergies    FAMILY HISTORY:  - Premature coronary artery disease  + Atrial fibrillation  - Sudden cardiac death     SOCIAL HISTORY:  Social History     Tobacco Use     Smoking status: Former Smoker     Packs/day: 0.50     Years: 3.00     Pack years: 1.50     Types: Cigarettes     Quit date: 2005     Years since quittin.4     Smokeless tobacco: Never Used   Substance Use Topics     Alcohol use: Yes     Alcohol/week: 8.3 standard drinks     Comment: 5 drinks a week     Drug use: No       ROS:    ROS: 10 point ROS neg other than the symptoms noted above in the HPI.    Exam:  /77 (BP Location: Right arm, Patient Position: Chair, Cuff Size: Adult Large)   Pulse 115   Wt 104.3 kg (230 lb)   SpO2 96%   BMI 31.19 kg/m    GENERAL APPEARANCE: healthy, alert and no distress  HEENT: no icterus, no xanthelasmas, normal pupil size  NECK: no asymmetry, masses, or scars, no bruits, JVP not elevated  RESPIRATORY: lungs clear to auscultation - no rales, rhonchi or wheezes, no use of accessory muscles, no retractions, respirations are  unlabored, normal respiratory rate  CARDIOVASCULAR: irregularly irregular rhythm, normal S1 with physiologic split S2, no S3 or S4 and no murmur, click or rub  ABDOMEN: soft, non tender, bowel sounds normal  EXTREMITIES: peripheral pulses normal, no edema  NEURO: alert and oriented to person/place/time, normal speech, gait and affect  VASC: Radial pulses are normal in volumes and symmetric bilaterally.  SKIN: no ecchymoses, no rashes    Labs:  CBC RESULTS:   Lab Results   Component Value Date    WBC 7.9 05/17/2021    RBC 5.08 05/17/2021    HGB 16.8 05/17/2021    HCT 47.6 05/17/2021    MCV 94 05/17/2021    MCH 33.1 (H) 05/17/2021    MCHC 35.3 05/17/2021    RDW 13.1 05/17/2021     05/17/2021       BMP RESULTS:  Lab Results   Component Value Date     05/17/2021    POTASSIUM 4.4 05/17/2021    CHLORIDE 111 (H) 05/17/2021    CO2 23 05/17/2021    ANIONGAP 6 05/17/2021     (H) 05/17/2021    BUN 14 05/17/2021    CR 1.07 05/17/2021    GFRESTIMATED 82 05/17/2021    GFRESTBLACK >90 05/17/2021    BOYD 8.6 05/17/2021        INR RESULTS:  Lab Results   Component Value Date    INR 1.10 01/16/2013     Results for KARINA MOHAMUD (MRN [id number]) as of 5/24/2021 16:28   Ref. Range 5/17/2021 13:07   TSH Latest Ref Range: 0.40 - 4.00 mU/L 0.87     Procedures:  Echocardiogram  Interpretation Summary     The rhythm was rapid atrial fibrillation.  BP: 131/81 mmHg.  The left ventricle is normal in size.  The visual ejection fraction is estimated at 60-65%.  The right ventricle is normal in size and function.  Normal atrial size.  Trace tricuspid and mitral valve regurgitation.  Dilated of the inferior vena cava.     There is no comparison study available.      Assessment and Plan:   Paroxysmal Atrial Fibrillation  [id number]    He has significant symptoms attributable to atrial fibrillation, and remains in atrial fibrillation at the time of our encounter. After a discussion of risks and benefits and potential  treatment options, we have elected to proceed with a DCCV. We will start him on NOAC today and arrange for ACACIA and DCCV later this week. He will need to remain on an anticoagulant for 4 weeks following cardioversion. In the interim, he should continue metoprolol for rate control.    - start apixaban 5mg BID  - will arrange for ACACIA and DCCV later this week  - continue metoprolol 12.5mg BID in the interim (this can be stopped after DCCV if successfully returned to sinus rhythm)    Follow-up to be determined at time of DCCV, likely 6 months.    Patient was seen and examined with the attending cardiologist, Dr. Wright, who is in agreement with the assessment and plan.    Bladimir King MD  Cardiology Fellow    I have seen, interviewed, and examined patient. I have reviewed the laboratory tests, imaging, and other investigations. I have reviewed the management plan with the patient. I discussed with the team and agree with the findings and plan in this resident/fellow/nurse practitioner's note. In addition, changes in the physical examination, assessment and plan have been incorporated into the note by myself, as to make it a single cohesive document.       Darcie Wright MD, MS  Cardiology/Cardiac EP Attending Staff        CC  Patient Care Team:  Claudia Solo MD as PCP - General (Family Practice)

## 2021-05-24 NOTE — PATIENT INSTRUCTIONS
Thank you for coming to the [facility] @ [place]; please note the following instructions:    1. STOP aspirin    2. START Eliquis 5 mg twice daily.  A prescription was sent to your preferred pharmacy.  STOP Eliquis 4 weeks after the cardioversion    3.  STOP metoprolol AFTER the procedure per Dr. Wright    4.  You are scheduled for a Transesophageal Echocardiogram followed by a Cardioversion at the [facility] ([address], [place] [address], [phone]).       You will need to undergo a COVID-19 PCR swab test within 4 days of procedure. You will receive a phone call with more information. If you do not hear from the COVID scheduling team, please call: [phone] to schedule.  5/25/21 at 10:30 am     Follow these instructions:    1. Report to the [facility] waiting room in the [facility] hospital on: ____Thursday, May 27th at 11:30 pm______    2. Please do not eat anything for 8 hours prior to your procedure. You may have sips of water up until 2 hours prior to your arrival.    3. The morning of your procedure you may take your scheduled medications with a SIP of water. If you take diabetic medications or a diuretic, you may hold these.     4. You will receive medication that makes you sleepy; you will need a  and someone to stay with you for 24 hours following this procedure.    You should not make any legal decisions for 24 hours following discharge.       If you have further questions, please utilize [employer] to contact us.   If your question concerns the above instructions, contact:  Kyleigh Robles RN  Electrophysiology Nurse Coordinator.  [phone]    If your question concerns the schedule/appointment times, contact:  [facility] Villanueva Procedure   [phone]            If you have any questions regarding your visit please contact your care team:     Cardiology  Telephone Number   Kyleigh BEARDEN, HENRY GILES, HENRY ISABEL, MARIELLE DE SANTIAGO, MARIELLE AUGUSTIN, NED    [phone] (option 1)   For scheduling appts:     [phone] (select option 1)       For the Device Clinic (Pacemakers and ICD's)  RN's :  Araceli Barreto   During business hours: [phone]    *After business hours:  [phone] (select option 4)      Normal test result notifications will be released via [employer] or mailed within 7 business days.  All other test results, will be communicated via telephone once reviewed by your cardiologist.    If you need a medication refill please contact your pharmacy.  Please allow 3 business days for your refill to be completed.    As always, thank you for trusting us with your health care needs!

## 2021-05-24 NOTE — NURSING NOTE
Chief Complaint   Patient presents with     Atrial Fib     NEW Dr. Wright new diagnosis of afib.  Echo recently completed with EF 55-60%.        Initial /77 (BP Location: Right arm, Patient Position: Chair, Cuff Size: Adult Large)   Pulse 115   Wt 104.3 kg (230 lb)   SpO2 96%   BMI 31.19 kg/m   Estimated body mass index is 31.19 kg/m  as calculated from the following:    Height as of 5/17/21: 1.829 m (6').    Weight as of this encounter: 104.3 kg (230 lb)..  BP completed using cuff size: large    Angela Lima MA

## 2021-05-24 NOTE — NURSING NOTE
Med Reconcile: Reviewed and verified all current medications with the patient. The updated medication list was printed and given to the patient.  New Medication: Patient was educated regarding newly prescribed medication, including discussion of  the indication, administration, side effects, and when to report to MD or RN. Patient demonstrated understanding of this information and agreed to call with further questions or concerns.  Eliquis 5 mg BID-continue 4 weeks after cardioversion then STOP  Return Appointment: Patient given instructions regarding scheduling next clinic visit. Patient demonstrated understanding of this information and agreed to call with further questions or concerns.  EP Procedure: Patient given instructions regarding ACACIA/ cardioversion Discussed purpose, preparation, and procedure with the patient. Patient demonstrated understanding of this information and agreed to call with further questions or concerns.  Medication Change: Patient was educated regarding prescribed medication change, including discussion of the indication, administration, side effects, and when to report to MD or RN. Patient demonstrated understanding of this information and agreed to call with further questions or concerns.  STOP aspirin, STOP metoprolol after the procedure,     Follow up in 6 months Dr. cardenas  Patient stated he understood all health information given and agreed to call with further questions or concerns.

## 2021-05-25 DIAGNOSIS — Z11.52 ENCOUNTER FOR PREPROCEDURE SCREENING LABORATORY TESTING FOR COVID-19: ICD-10-CM

## 2021-05-25 DIAGNOSIS — Z01.812 ENCOUNTER FOR PREPROCEDURE SCREENING LABORATORY TESTING FOR COVID-19: ICD-10-CM

## 2021-05-25 LAB
SARS-COV-2 RNA RESP QL NAA+PROBE: NORMAL
SPECIMEN SOURCE: NORMAL

## 2021-05-25 PROCEDURE — U0005 INFEC AGEN DETEC AMPLI PROBE: HCPCS | Performed by: INTERNAL MEDICINE

## 2021-05-25 PROCEDURE — U0003 INFECTIOUS AGENT DETECTION BY NUCLEIC ACID (DNA OR RNA); SEVERE ACUTE RESPIRATORY SYNDROME CORONAVIRUS 2 (SARS-COV-2) (CORONAVIRUS DISEASE [COVID-19]), AMPLIFIED PROBE TECHNIQUE, MAKING USE OF HIGH THROUGHPUT TECHNOLOGIES AS DESCRIBED BY CMS-2020-01-R: HCPCS | Performed by: INTERNAL MEDICINE

## 2021-05-26 LAB
LABORATORY COMMENT REPORT: NORMAL
SARS-COV-2 RNA RESP QL NAA+PROBE: NEGATIVE
SPECIMEN SOURCE: NORMAL

## 2021-05-27 ENCOUNTER — TELEPHONE (OUTPATIENT)
Dept: CARDIOLOGY | Facility: CLINIC | Age: 46
End: 2021-05-27

## 2021-05-27 ENCOUNTER — APPOINTMENT (OUTPATIENT)
Dept: MEDSURG UNIT | Facility: CLINIC | Age: 46
End: 2021-05-27
Payer: COMMERCIAL

## 2021-05-27 PROCEDURE — 93010 ELECTROCARDIOGRAM REPORT: CPT | Performed by: INTERNAL MEDICINE

## 2021-05-27 NOTE — CONFIDENTIAL NOTE
Writer spoke with patient who states that he feels that he could be out of afib.  The apple watch is not detecting it.  Writer advised patient to go to the hospital as planned and if he is in normal sinus rhythm they will reassess and he will be able to go home without having the procedure.  Patient is agreeable with the plan and verbalized understanding.    Kyleigh Robles RN  Cardiology Care Coordinator  [facility]  [phone] option 1

## 2021-05-27 NOTE — TELEPHONE ENCOUNTER
[facility] Call Center    Phone Message    May a detailed message be left on voicemail: no     Reason for Call: Other: Gagandeep called to advise he woke up this morning feeling like he was no longer in Afib, he has a procedure today at 12:00pm and would like to speak with a member of the care team to get clarification if he still needs to come in for this appointment today. Please reach out to him at [phone].     Action Taken: Other: [facility]    Travel Screening: Not Applicable

## 2021-05-28 LAB — INTERPRETATION ECG - MUSE: NORMAL

## 2021-06-21 ENCOUNTER — OFFICE VISIT (OUTPATIENT)
Dept: CARDIOLOGY | Facility: CLINIC | Age: 46
End: 2021-06-21
Payer: COMMERCIAL

## 2021-06-21 VITALS
WEIGHT: 229 LBS | DIASTOLIC BLOOD PRESSURE: 81 MMHG | SYSTOLIC BLOOD PRESSURE: 122 MMHG | HEART RATE: 72 BPM | BODY MASS INDEX: 31.06 KG/M2 | OXYGEN SATURATION: 97 %

## 2021-06-21 DIAGNOSIS — I48.0 PAROXYSMAL ATRIAL FIBRILLATION (H): Primary | ICD-10-CM

## 2021-06-21 PROCEDURE — 99214 OFFICE O/P EST MOD 30 MIN: CPT | Mod: GC | Performed by: INTERNAL MEDICINE

## 2021-06-21 NOTE — PROGRESS NOTES
HPI:   Mr Gagandeep Oswald is a 45yo M with little past medical history (HLD, anxiety) who returns to clinic for evaluation of symptomatic paroxysmal atrial fibrillation. He does not have a history of HTN, DM2, CAD/MI, CVA, PAD, or CHF. He was initially evaluated on 5/24/21 in clinic. At that time he was in atrial fibrillation and quite symptomatic. He was started on apixaban and scheduled for DCCV. In the interim, he had spontaneous conversion to sinus. He noted a resolution in palpitations and put on an apple watch which showed sinus rhythm. He did still report to the [facility] on 5/27/21 as scheduled, and EKG at that time confirmed normal sinus rhythm.      Since then, he has felt great. He has not had any recurrence of palpitations. The lightheadedness and shortness of breath has resolved. He is motivated to resume regular physical activity. He otherwise has continued taking metoprolol and apixaban    PAST MEDICAL HISTORY:  Past Medical History:   Diagnosis Date     Facial nerve palsy     LT congenital      Hyperlipidemia LDL goal < 160 5/7/2012     Nephrolithiasis     has had it 3 times-last one was in 2016     New onset atrial fibrillation (H) 5/17/2021     Varicocele     LT       CURRENT MEDICATIONS:  Current Outpatient Medications   Medication Sig Dispense Refill     apixaban ANTICOAGULANT (ELIQUIS ANTICOAGULANT) 5 MG tablet Take 1 tablet (5 mg) by mouth 2 times daily 60 tablet 1     Multiple Vitamin (MULTIVITAMINS PO) Take  by mouth.         PAST SURGICAL HISTORY:  Past Surgical History:   Procedure Laterality Date     ENDOSCOPIC SINUS SURGERY  1/24/2013    Procedure: ENDOSCOPIC SINUS SURGERY;  Bilateral total ethmoidectomy, bilateral endoscopic maxillary antrostomy;  Surgeon: Han Toledo MD;  Location: [facility] OR     MOUTH SURGERY  6/2012    [place] Teeth Removed     VASECTOMY         ALLERGIES:   No Known Allergies    FAMILY HISTORY:  I have reviewed this patient's family history and  updated it with pertinent information if needed.  Family History   Problem Relation Age of Onset     Allergies Mother      Arthritis Mother      Breast Cancer Mother 71     Diabetes Mother      Obesity Mother      Pancreatic Cancer Mother         d age 80     Obesity Brother      Diabetes Brother      C.A.D. Father 82        MI     Cancer Father         skin     Prostate Cancer Father      Kidney Cancer Father         d age 85     Colon Cancer No family hx of    He has a strong family history of atrial fibrillation, and notes that both of his parents and 5 of his siblings have it.      SOCIAL HISTORY:  Social History     Tobacco Use     Smoking status: Former Smoker     Packs/day: 0.50     Years: 3.00     Pack years: 1.50     Types: Cigarettes     Quit date: 2005     Years since quittin.4     Smokeless tobacco: Never Used   Substance Use Topics     Alcohol use: Yes     Alcohol/week: 8.3 standard drinks     Comment: 5 drinks a week     Drug use: No       ROS:    ROS: 10 point ROS neg other than the symptoms noted above in the HPI.    Exam:  /81 (BP Location: Right arm, Patient Position: Chair, Cuff Size: Adult Large)   Pulse 72   Wt 103.9 kg (229 lb)   SpO2 97%   BMI 31.06 kg/m    GENERAL APPEARANCE: healthy, alert and no distress  HEENT: no icterus, no xanthelasmas, normal pupil size  NECK: no asymmetry, masses, or scars, no bruits, JVP not elevated  RESPIRATORY: lungs clear to auscultation - no rales, rhonchi or wheezes, no use of accessory muscles, no retractions, respirations are unlabored, normal respiratory rate  CARDIOVASCULAR: regular rhythm, normal S1 with physiologic split S2, no S3 or S4 and no murmur, click or rub  EXTREMITIES: peripheral pulses normal, no edema  NEURO: alert and oriented to person/place/time, normal speech, gait and affect  VASC: Radial pulses are normal in volumes and symmetric bilaterally. No bruits are heard.  SKIN: no ecchymoses, no rashes    Labs:  CBC RESULTS:    Lab Results   Component Value Date    WBC 7.9 05/17/2021    RBC 5.08 05/17/2021    HGB 16.8 05/17/2021    HCT 47.6 05/17/2021    MCV 94 05/17/2021    MCH 33.1 (H) 05/17/2021    MCHC 35.3 05/17/2021    RDW 13.1 05/17/2021     05/17/2021       BMP RESULTS:  Lab Results   Component Value Date     05/17/2021    POTASSIUM 4.4 05/17/2021    CHLORIDE 111 (H) 05/17/2021    CO2 23 05/17/2021    ANIONGAP 6 05/17/2021     (H) 05/17/2021    BUN 14 05/17/2021    CR 1.07 05/17/2021    GFRESTIMATED 82 05/17/2021    GFRESTBLACK >90 05/17/2021    BOYD 8.6 05/17/2021        INR RESULTS:  Lab Results   Component Value Date    INR 1.10 01/16/2013       Procedures:  Echocardiogram  Interpretation Summary     The rhythm was rapid atrial fibrillation.  BP: 131/81 mmHg.  The left ventricle is normal in size.  The visual ejection fraction is estimated at 60-65%.  The right ventricle is normal in size and function.  Normal atrial size.  Trace tricuspid and mitral valve regurgitation.  Dilated of the inferior vena cava.     There is no comparison study available.    Assessment and Plan:   # Paroxysmal Atrial Fibrillation  [id number]    Mr Oswald is a 45yo M with paroxysmal atrial fibrillation. He has had spontaneous conversion to sinus rhythm and is now entirely symptom free. We will proceed with a watchful waiting approach.  - stop apixaban  - stop metoprolol  - recommend routine self-monitoring of his heart rate and rhythm with apple watch or other [facility] grade heart monitor    Return to clinic in 1 year, sooner if needed.    The patient was seen and examined with the attending cardiologist, Dr. Wright, who is in agreement with the assessment and plan.    Bladimir King MD  Cardiology Fellow      I have seen, interviewed, and examined patient. I have reviewed the laboratory tests, imaging, and other investigations. I have reviewed the management plan with the patient. I discussed with the team and agree with the  findings and plan in this resident/fellow/nurse practitioner's note. In addition, changes in the physical examination, assessment and plan have been incorporated into the note by myself, as to make it a single cohesive document.       Darcie Wright MD, MS  Cardiology/Cardiac EP Attending Staff        CC  Patient Care Team:  Claudia Solo MD as PCP - General (Family Practice)  Claudia Solo MD as Assigned PCP

## 2021-06-21 NOTE — PATIENT INSTRUCTIONS
Thank you for coming to the [facility] @ [place] [facility]; please note the following instructions:    1. Follow up in 1 year with Dr. Wright.  The cardiology nurse will contact you when the time gets closer        If you have any questions regarding your visit please contact your care team:     Cardiology  Telephone Number   Kyleigh BEARDEN, RN  Katerina GILES, RN   Jennifer ISABEL, RMA  Angela DE SANTIAGO, RMA  Mone AUGUSTIN, LPN   [phone] (option 1)   For scheduling appts:     [phone] (select option 1)       For the Device Clinic (Pacemakers and ICD's)  RN's :  Araceli Barreto   During business hours: [phone]    *After business hours:  [phone] (select option 4)      Normal test result notifications will be released via [employer] or mailed within 7 business days.  All other test results, will be communicated via telephone once reviewed by your cardiologist.    If you need a medication refill please contact your pharmacy.  Please allow 3 business days for your refill to be completed.    As always, thank you for trusting us with your health care needs!

## 2021-06-21 NOTE — LETTER
6/21/2021      RE: Gagandeep Oswald  [address] [place] [address]       Dear Colleague,    Thank you for the opportunity to participate in the care of your patient, Gagandeep Oswald, at the [facility] at [facility]. Please see a copy of my visit note below.    HPI:   Mr Gagandeep Oswald is a 47yo M with little past medical history (HLD, anxiety) who returns to clinic for evaluation of symptomatic paroxysmal atrial fibrillation. He does not have a history of HTN, DM2, CAD/MI, CVA, PAD, or CHF. He was initially evaluated on 5/24/21 in clinic. At that time he was in atrial fibrillation and quite symptomatic. He was started on apixaban and scheduled for DCCV. In the interim, he had spontaneous conversion to sinus. He noted a resolution in palpitations and put on an apple watch which showed sinus rhythm. He did still report to the [facility] on 5/27/21 as scheduled, and EKG at that time confirmed normal sinus rhythm.      Since then, he has felt great. He has not had any recurrence of palpitations. The lightheadedness and shortness of breath has resolved. He is motivated to resume regular physical activity. He otherwise has continued taking metoprolol and apixaban    PAST MEDICAL HISTORY:  Past Medical History:   Diagnosis Date     Facial nerve palsy     LT congenital      Hyperlipidemia LDL goal < 160 5/7/2012     Nephrolithiasis     has had it 3 times-last one was in 2016     New onset atrial fibrillation (H) 5/17/2021     Varicocele     LT       CURRENT MEDICATIONS:  Current Outpatient Medications   Medication Sig Dispense Refill     apixaban ANTICOAGULANT (ELIQUIS ANTICOAGULANT) 5 MG tablet Take 1 tablet (5 mg) by mouth 2 times daily 60 tablet 1     Multiple Vitamin (MULTIVITAMINS PO) Take  by mouth.         PAST SURGICAL HISTORY:  Past Surgical History:   Procedure Laterality Date     ENDOSCOPIC SINUS SURGERY   2013    Procedure: ENDOSCOPIC SINUS SURGERY;  Bilateral total ethmoidectomy, bilateral endoscopic maxillary antrostomy;  Surgeon: Han Toledo MD;  Location: [facility] OR     MOUTH SURGERY  2012    [place] Teeth Removed     VASECTOMY         ALLERGIES:   No Known Allergies    FAMILY HISTORY:  I have reviewed this patient's family history and updated it with pertinent information if needed.  Family History   Problem Relation Age of Onset     Allergies Mother      Arthritis Mother      Breast Cancer Mother 71     Diabetes Mother      Obesity Mother      Pancreatic Cancer Mother         d age 80     Obesity Brother      Diabetes Brother      C.A.D. Father 82        MI     Cancer Father         skin     Prostate Cancer Father      Kidney Cancer Father         d age 85     Colon Cancer No family hx of    He has a strong family history of atrial fibrillation, and notes that both of his parents and 5 of his siblings have it.      SOCIAL HISTORY:  Social History     Tobacco Use     Smoking status: Former Smoker     Packs/day: 0.50     Years: 3.00     Pack years: 1.50     Types: Cigarettes     Quit date: 2005     Years since quittin.4     Smokeless tobacco: Never Used   Substance Use Topics     Alcohol use: Yes     Alcohol/week: 8.3 standard drinks     Comment: 5 drinks a week     Drug use: No       ROS:    ROS: 10 point ROS neg other than the symptoms noted above in the HPI.    Exam:  /81 (BP Location: Right arm, Patient Position: Chair, Cuff Size: Adult Large)   Pulse 72   Wt 103.9 kg (229 lb)   SpO2 97%   BMI 31.06 kg/m    GENERAL APPEARANCE: healthy, alert and no distress  HEENT: no icterus, no xanthelasmas, normal pupil size  NECK: no asymmetry, masses, or scars, no bruits, JVP not elevated  RESPIRATORY: lungs clear to auscultation - no rales, rhonchi or wheezes, no use of accessory muscles, no retractions, respirations are unlabored, normal respiratory rate  CARDIOVASCULAR: regular rhythm,  normal S1 with physiologic split S2, no S3 or S4 and no murmur, click or rub  EXTREMITIES: peripheral pulses normal, no edema  NEURO: alert and oriented to person/place/time, normal speech, gait and affect  VASC: Radial pulses are normal in volumes and symmetric bilaterally. No bruits are heard.  SKIN: no ecchymoses, no rashes    Labs:  CBC RESULTS:   Lab Results   Component Value Date    WBC 7.9 05/17/2021    RBC 5.08 05/17/2021    HGB 16.8 05/17/2021    HCT 47.6 05/17/2021    MCV 94 05/17/2021    MCH 33.1 (H) 05/17/2021    MCHC 35.3 05/17/2021    RDW 13.1 05/17/2021     05/17/2021       BMP RESULTS:  Lab Results   Component Value Date     05/17/2021    POTASSIUM 4.4 05/17/2021    CHLORIDE 111 (H) 05/17/2021    CO2 23 05/17/2021    ANIONGAP 6 05/17/2021     (H) 05/17/2021    BUN 14 05/17/2021    CR 1.07 05/17/2021    GFRESTIMATED 82 05/17/2021    GFRESTBLACK >90 05/17/2021    BOYD 8.6 05/17/2021        INR RESULTS:  Lab Results   Component Value Date    INR 1.10 01/16/2013       Procedures:  Echocardiogram  Interpretation Summary     The rhythm was rapid atrial fibrillation.  BP: 131/81 mmHg.  The left ventricle is normal in size.  The visual ejection fraction is estimated at 60-65%.  The right ventricle is normal in size and function.  Normal atrial size.  Trace tricuspid and mitral valve regurgitation.  Dilated of the inferior vena cava.     There is no comparison study available.    Assessment and Plan:   # Paroxysmal Atrial Fibrillation  [id number]    Mr Oswald is a 45yo M with paroxysmal atrial fibrillation. He has had spontaneous conversion to sinus rhythm and is now entirely symptom free. We will proceed with a watchful waiting approach.  - stop apixaban  - stop metoprolol  - recommend routine self-monitoring of his heart rate and rhythm with apple watch or other consumer grade heart monitor    Return to clinic in 1 year, sooner if needed.    The patient was seen and examined with the  attending cardiologist, Dr. Wright, who is in agreement with the assessment and plan.    Bladimir King MD  Cardiology Fellow      I have seen, interviewed, and examined patient. I have reviewed the laboratory tests, imaging, and other investigations. I have reviewed the management plan with the patient. I discussed with the team and agree with the findings and plan in this resident/fellow/nurse practitioner's note. In addition, changes in the physical examination, assessment and plan have been incorporated into the note by myself, as to make it a single cohesive document.       Darcie Wright MD, MS  Cardiology/Cardiac EP Attending Staff      CC  Patient Care Team:  Claudia Solo MD as PCP - General (Family Practice)

## 2021-06-21 NOTE — NURSING NOTE
Chief Complaint   Patient presents with     RECHECK     1 month follow up. DCC not done.        Initial /81 (BP Location: Right arm, Patient Position: Chair, Cuff Size: Adult Large)   Pulse 72   Wt 103.9 kg (229 lb)   SpO2 97%   BMI 31.06 kg/m   Estimated body mass index is 31.06 kg/m  as calculated from the following:    Height as of 5/17/21: 1.829 m (6').    Weight as of this encounter: 103.9 kg (229 lb)..  BP completed using cuff size: heather Lima MA

## 2021-09-26 ENCOUNTER — HEALTH MAINTENANCE LETTER (OUTPATIENT)
Age: 46
End: 2021-09-26

## 2021-09-28 ENCOUNTER — OFFICE VISIT (OUTPATIENT)
Dept: FAMILY MEDICINE | Facility: CLINIC | Age: 46
End: 2021-09-28
Payer: COMMERCIAL

## 2021-09-28 VITALS
SYSTOLIC BLOOD PRESSURE: 129 MMHG | WEIGHT: 225 LBS | OXYGEN SATURATION: 99 % | DIASTOLIC BLOOD PRESSURE: 82 MMHG | TEMPERATURE: 98.7 F | HEART RATE: 82 BPM | BODY MASS INDEX: 30.52 KG/M2

## 2021-09-28 DIAGNOSIS — N20.0 NEPHROLITHIASIS: Primary | ICD-10-CM

## 2021-09-28 LAB
ANION GAP SERPL CALCULATED.3IONS-SCNC: 3 MMOL/L (ref 3–14)
BUN SERPL-MCNC: 15 MG/DL (ref 7–30)
CALCIUM SERPL-MCNC: 9.1 MG/DL (ref 8.5–10.1)
CHLORIDE BLD-SCNC: 110 MMOL/L (ref 94–109)
CO2 SERPL-SCNC: 28 MMOL/L (ref 20–32)
CREAT SERPL-MCNC: 1.03 MG/DL (ref 0.66–1.25)
GFR SERPL CREATININE-BSD FRML MDRD: 86 ML/MIN/1.73M2
GLUCOSE BLD-MCNC: 97 MG/DL (ref 70–99)
POTASSIUM BLD-SCNC: 4.3 MMOL/L (ref 3.4–5.3)
SODIUM SERPL-SCNC: 141 MMOL/L (ref 133–144)

## 2021-09-28 PROCEDURE — 80048 BASIC METABOLIC PNL TOTAL CA: CPT | Performed by: PHYSICIAN ASSISTANT

## 2021-09-28 PROCEDURE — 90471 IMMUNIZATION ADMIN: CPT | Performed by: PHYSICIAN ASSISTANT

## 2021-09-28 PROCEDURE — 99213 OFFICE O/P EST LOW 20 MIN: CPT | Mod: 25 | Performed by: PHYSICIAN ASSISTANT

## 2021-09-28 PROCEDURE — 82365 CALCULUS SPECTROSCOPY: CPT | Mod: 90 | Performed by: PHYSICIAN ASSISTANT

## 2021-09-28 PROCEDURE — 36415 COLL VENOUS BLD VENIPUNCTURE: CPT | Performed by: PHYSICIAN ASSISTANT

## 2021-09-28 PROCEDURE — 90714 TD VACC NO PRESV 7 YRS+ IM: CPT | Performed by: PHYSICIAN ASSISTANT

## 2021-09-28 PROCEDURE — 99000 SPECIMEN HANDLING OFFICE-LAB: CPT | Performed by: PHYSICIAN ASSISTANT

## 2021-09-28 NOTE — NURSING NOTE
Due to injection administration, patient instructed to remain in clinic for 15 minutes  afterwards, and to report any adverse reaction to me immediately.  Declined VIS.    Verified patients Name and .      Shweta WALKER MA

## 2021-09-28 NOTE — PROGRESS NOTES
Assessment & Plan     Nephrolithiasis  Patient collected stones, will send for analysis. BMP to recheck kidney function. Patient feeling well. Urology referral for consult.   - Basic metabolic panel; Future  - Stone analysis; Future  - Adult Urology Referral; Future  - Stone analysis  - Basic metabolic panel             BMI:   Estimated body mass index is 30.52 kg/m  as calculated from the following:    Height as of 5/17/21: 1.829 m (6').    Weight as of this encounter: 102.1 kg (225 lb).           No follow-ups on file.    BUCKY Pollock [facility]  The student Matilde [email or web address] acted as a scribe and the encounter documented above was completely performed by myself and the documentation reflects the work I have performed today.   Carin Farmer   [facility] 46 [facility] who presents for the following health issues.    [facility]     ED/ Followup:    Facility:  [facility] Emergency [facility]  Date of visit: 9/20/21  Reason for visit: Kidney Stones  Current Status: Pt is requesting a referral for urology.    Patient was seen at the ED one week ago for kidney stones. He was discharged with pain medications. He later strained his urine and he was able to collect 2 stones which he brought to clinic today for analysis. No symptoms today.  Not on any medications right now. Stopped Eliquis months ago. No chest pain, shortness of breath, of palpitations since May 2021.    Review of Systems   Constitutional, HEENT, cardiovascular, pulmonary, gi and gu systems are negative, except as otherwise noted.      Objective    /82 (BP Location: Right arm, Patient Position: Chair, Cuff Size: Adult Large)   Pulse 82   Temp 98.7  F (37.1  C) (Oral)   Wt 102.1 kg (225 lb)   SpO2 99%   BMI 30.52 kg/m    Body mass index is 30.52 kg/m .  Physical Exam   GENERAL: healthy, alert and no distress  RESP: lungs clear to auscultation - no rales, rhonchi or wheezes  CV:  regular rate and rhythm, normal S1 S2, no S3 or S4, no murmur, click or rub, no peripheral edema.  ABDOMEN: No CVA tenderness elicited with palpation. soft, nontender, no hepatosplenomegaly, no masses and bowel sounds normal  MS: no gross musculoskeletal defects noted, no edema

## 2021-09-29 NOTE — RESULT ENCOUNTER NOTE
Your kidney function has returned to normal. Your stone analysis will be sent separately.   Carin Chavez PA-C

## 2021-10-02 LAB
APPEARANCE STONE: NORMAL
COMPN STONE: NORMAL
NUMBER STONE: 2
SIZE STONE: NORMAL MM
SPECIMEN WT: 28 MG

## 2021-10-04 NOTE — RESULT ENCOUNTER NOTE
Gagandeep,     Your stone analysis noted calcium oxalate which is the most common type of stone. I would follow up with the urologist as planned.   Carin Chavez PA-C

## 2021-10-19 ENCOUNTER — MYC MEDICAL ADVICE (OUTPATIENT)
Dept: FAMILY MEDICINE | Facility: CLINIC | Age: 46
End: 2021-10-19

## 2021-10-28 ENCOUNTER — ANCILLARY PROCEDURE (OUTPATIENT)
Dept: GENERAL RADIOLOGY | Facility: CLINIC | Age: 46
End: 2021-10-28
Attending: UROLOGY
Payer: COMMERCIAL

## 2021-10-28 ENCOUNTER — VIRTUAL VISIT (OUTPATIENT)
Dept: UROLOGY | Facility: CLINIC | Age: 46
End: 2021-10-28
Attending: PHYSICIAN ASSISTANT
Payer: COMMERCIAL

## 2021-10-28 DIAGNOSIS — N20.0 NEPHROLITHIASIS: ICD-10-CM

## 2021-10-28 PROCEDURE — 99203 OFFICE O/P NEW LOW 30 MIN: CPT | Mod: GT | Performed by: UROLOGY

## 2021-10-28 PROCEDURE — 74019 RADEX ABDOMEN 2 VIEWS: CPT | Performed by: RADIOLOGY

## 2021-10-28 NOTE — PATIENT INSTRUCTIONS
Please arrange a KUB xray and lab appointment for urorisk testing. You will need to  a urine jug at any [place] lab.        Instructions for 24 hour urine collection   1. The first morning urine (on the day of collection) MUST BE DISARDED. Write the time of this void on this slip.   2. Collect all urine voids for the next 24 hours.   3. The final collection should be the first morning void on the second day. Write the time of collection on this slip  4. It is expected that you keep your specimen container in the refrigerator. It must not freeze. Keep containers out of children's reach.    Starting time: ______________ A.M.    Ending time:________________ A.M.  Date: __________________________  Name: _________________________     Account : ____________________   Ordering Doctor: ________________________________________________  Tests to be done: ________________________________________________       Please call our office if you have questions or need to report any information, [phone].

## 2021-10-28 NOTE — PROGRESS NOTES
Gagandeep is a 46 year old who is being evaluated via a billable video visit.      How would you like to obtain your AVS? MyChart  If the video visit is dropped, the invitation should be resent by: Text to cell phone:    Will anyone else be joining your video visit? No      Video Start Time: 1010    Assessment & Plan   Problem List Items Addressed This Visit     None      Visit Diagnoses     Nephrolithiasis               Review of external notes as documented elsewhere in note  Review of the result(s) of each unique test - CT scan  Ordering of each unique test  30 minutes spent on the date of the encounter doing chart review, history and exam, documentation and further activities per the note       BMI:   Estimated body mass index is 30.52 kg/m  as calculated from the following:    Height as of 5/17/21: 1.829 m (6').    Weight as of 9/28/21: 102.1 kg (225 lb).   Weight management plan: Patient was referred to their PCP to discuss a diet and exercise plan.    See Patient Instructions    No follow-ups on file.    Chucho Hernandez MD  [facility]    Marleny Donis is a 46 year old who presents for the following health issues renal stones    HPI     Patient is a pleasant 46-year-old male who was requested to be seen by any go back for a consultation with regard patient's kidney stones.  Patient passed a left and also right renal stones recently.  He was seen emergency room a month ago with CT scan showing multiple renal stones bilaterally.  Patient has a long history of kidney stones.  His stone composition was calcium.  He is currently not symptomatic.    Review of Systems   Constitutional, HEENT, cardiovascular, pulmonary, gi and gu systems are negative, except as otherwise noted.      Objective           Vitals:  No vitals were obtained today due to virtual visit.    Physical Exam   GENERAL: Healthy, alert and no distress  EYES: Eyes grossly normal to inspection.  No discharge or  erythema, or obvious scleral/conjunctival abnormalities.  RESP: No audible wheeze, cough, or visible cyanosis.  No visible retractions or increased work of breathing.    SKIN: Visible skin clear. No significant rash, abnormal pigmentation or lesions.  NEURO: Cranial nerves grossly intact.  Mentation and speech appropriate for age.  PSYCH: Mentation appears normal, affect normal/bright, judgement and insight intact, normal speech and appearance well-groomed.    Recurrent renal stones: Schedule patient for a KUB and urorisks next.            Video-Visit Details    Type of service:  Video Visit    Video End Time:10:24 AM    Originating Location (pt. Location): Home    Distant Location (provider location):  [facility]     Platform used for Video Visit: [employer]

## 2022-01-14 ENCOUNTER — OFFICE VISIT (OUTPATIENT)
Dept: URGENT CARE | Facility: URGENT CARE | Age: 47
End: 2022-01-14
Payer: COMMERCIAL

## 2022-01-14 VITALS
BODY MASS INDEX: 30.79 KG/M2 | WEIGHT: 227 LBS | SYSTOLIC BLOOD PRESSURE: 131 MMHG | HEART RATE: 93 BPM | DIASTOLIC BLOOD PRESSURE: 78 MMHG | TEMPERATURE: 97.3 F | OXYGEN SATURATION: 98 %

## 2022-01-14 DIAGNOSIS — F41.9 ANXIETY: ICD-10-CM

## 2022-01-14 DIAGNOSIS — R42 LIGHTHEADEDNESS: Primary | ICD-10-CM

## 2022-01-14 LAB
ALBUMIN SERPL-MCNC: 3.9 G/DL (ref 3.4–5)
ALP SERPL-CCNC: 102 U/L (ref 40–150)
ALT SERPL W P-5'-P-CCNC: 40 U/L (ref 0–70)
ANION GAP SERPL CALCULATED.3IONS-SCNC: 8 MMOL/L (ref 3–14)
AST SERPL W P-5'-P-CCNC: 24 U/L (ref 0–45)
BASOPHILS # BLD AUTO: 0.1 10E3/UL (ref 0–0.2)
BASOPHILS NFR BLD AUTO: 1 %
BILIRUB SERPL-MCNC: 0.4 MG/DL (ref 0.2–1.3)
BUN SERPL-MCNC: 21 MG/DL (ref 7–30)
CALCIUM SERPL-MCNC: 8.8 MG/DL (ref 8.5–10.1)
CHLORIDE BLD-SCNC: 108 MMOL/L (ref 94–109)
CO2 SERPL-SCNC: 25 MMOL/L (ref 20–32)
CREAT SERPL-MCNC: 1.18 MG/DL (ref 0.66–1.25)
EOSINOPHIL # BLD AUTO: 0.1 10E3/UL (ref 0–0.7)
EOSINOPHIL NFR BLD AUTO: 2 %
ERYTHROCYTE [DISTWIDTH] IN BLOOD BY AUTOMATED COUNT: 12.5 % (ref 10–15)
GFR SERPL CREATININE-BSD FRML MDRD: 77 ML/MIN/1.73M2
GLUCOSE BLD-MCNC: 98 MG/DL (ref 70–99)
HBA1C MFR BLD: 5.1 % (ref 0–5.6)
HCT VFR BLD AUTO: 45.8 % (ref 40–53)
HGB BLD-MCNC: 15.6 G/DL (ref 13.3–17.7)
IMM GRANULOCYTES # BLD: 0 10E3/UL
IMM GRANULOCYTES NFR BLD: 0 %
LYMPHOCYTES # BLD AUTO: 1.2 10E3/UL (ref 0.8–5.3)
LYMPHOCYTES NFR BLD AUTO: 17 %
MCH RBC QN AUTO: 32.5 PG (ref 26.5–33)
MCHC RBC AUTO-ENTMCNC: 34.1 G/DL (ref 31.5–36.5)
MCV RBC AUTO: 95 FL (ref 78–100)
MONOCYTES # BLD AUTO: 0.7 10E3/UL (ref 0–1.3)
MONOCYTES NFR BLD AUTO: 11 %
NEUTROPHILS # BLD AUTO: 4.8 10E3/UL (ref 1.6–8.3)
NEUTROPHILS NFR BLD AUTO: 69 %
PLATELET # BLD AUTO: 156 10E3/UL (ref 150–450)
POTASSIUM BLD-SCNC: 4.2 MMOL/L (ref 3.4–5.3)
PROT SERPL-MCNC: 6.9 G/DL (ref 6.8–8.8)
RBC # BLD AUTO: 4.8 10E6/UL (ref 4.4–5.9)
SODIUM SERPL-SCNC: 141 MMOL/L (ref 133–144)
TSH SERPL DL<=0.005 MIU/L-ACNC: 0.55 MU/L (ref 0.4–4)
WBC # BLD AUTO: 7 10E3/UL (ref 4–11)

## 2022-01-14 PROCEDURE — 80050 GENERAL HEALTH PANEL: CPT | Performed by: PHYSICIAN ASSISTANT

## 2022-01-14 PROCEDURE — 83036 HEMOGLOBIN GLYCOSYLATED A1C: CPT | Performed by: PHYSICIAN ASSISTANT

## 2022-01-14 PROCEDURE — 99214 OFFICE O/P EST MOD 30 MIN: CPT | Performed by: PHYSICIAN ASSISTANT

## 2022-01-14 PROCEDURE — 36415 COLL VENOUS BLD VENIPUNCTURE: CPT | Performed by: PHYSICIAN ASSISTANT

## 2022-01-14 RX ORDER — OXYCODONE AND ACETAMINOPHEN 5; 325 MG/1; MG/1
1 TABLET ORAL
COMMUNITY
Start: 2021-09-20 | End: 2022-02-01

## 2022-01-14 RX ORDER — IBUPROFEN 600 MG/1
600 TABLET, FILM COATED ORAL
COMMUNITY
Start: 2021-09-20 | End: 2022-02-01

## 2022-01-14 RX ORDER — TAMSULOSIN HYDROCHLORIDE 0.4 MG/1
0.4 CAPSULE ORAL
COMMUNITY
Start: 2021-09-20 | End: 2022-02-01

## 2022-01-14 RX ORDER — ONDANSETRON 8 MG/1
8 TABLET, ORALLY DISINTEGRATING ORAL
COMMUNITY
Start: 2021-09-20 | End: 2022-02-01

## 2022-01-14 RX ORDER — HYDROXYZINE HYDROCHLORIDE 25 MG/1
25 TABLET, FILM COATED ORAL 3 TIMES DAILY PRN
Qty: 30 TABLET | Refills: 0 | Status: SHIPPED | OUTPATIENT
Start: 2022-01-14 | End: 2022-01-24

## 2022-01-14 ASSESSMENT — PAIN SCALES - GENERAL: PAINLEVEL: NO PAIN (0)

## 2022-01-14 NOTE — PROGRESS NOTES
hgb a1c    Chief Complaint   Patient presents with     Dizziness     for past 15 days                  ASSESSMENT:     ICD-10-CM    1. Lightheadedness  R42 CBC with platelets and differential     Comprehensive metabolic panel (BMP + Alb, Alk Phos, ALT, AST, Total. Bili, TP)     TSH with free T4 reflex     hydrOXYzine (ATARAX) 25 MG tablet     Hemoglobin A1c (aka HBA1C)     CBC with platelets and differential     Comprehensive metabolic panel (BMP + Alb, Alk Phos, ALT, AST, Total. Bili, TP)     TSH with free T4 reflex     Hemoglobin A1c (aka HBA1C)   2. Anxiety  F41.9 hydrOXYzine (ATARAX) 25 MG tablet           PLAN: Vital signs stable.  Intermittent lightheadedness for 15 days.  Unclear etiology.  He feels it may be related to anxiety.  Given prescription for Atarax to try.  Basic labs obtained here tonight.  Follow-up with primary care provider in 2 weeks for further evaluation and treatment.  I have discussed clinical findings with patient.  Side effects of medications discussed.  Symptomatic care is discussed.  I have discussed the possibility of  worsening symptoms and indication to RTC or go to the ER if they occur.  All questions are answered, patient indicates understanding of these issues and is in agreement with plan.   Patient care instructions are discussed/given at the end of visit.   Lots of rest and fluids.      Nina Mckoy PA-C      SUBJECTIVE:  47 yo male presents for lightheadedness for 15 days, comes and goes.  Lasts minutes to 1 hour.  No chest pain, palpitations, abdomen-soft, nontender.  Shortness of breath, nausea, vomiting, cough, sore throat, nasal congestion.  No history of asthma.  Rarely drinks coffee or energy drinks.  No decreased hearing, ear pain.  2 nights ago did have ringing in both ears.  No history of TMJ.  No blurry vision.  Has nerve damage left face after breech delivery.  Unable to raise left eyebrow and left side of mouth.  No upper or lower extremity  paresthesias.  Has noticed over the last year that he seems more anxious, especially over the last 2 months.  Feels that the lightheadedness may be due to anxiety.    Allergies   Allergen Reactions     Nickel Itching and Rash       Past Medical History:   Diagnosis Date     Facial nerve palsy     LT congenital      Hyperlipidemia LDL goal < 160 2012     Nephrolithiasis     has had it 3 times-last one was in 2016     New onset atrial fibrillation (H) 2021     Varicocele     LT       Multiple Vitamin (MULTIVITAMINS PO), Take  by mouth.  ibuprofen (ADVIL/MOTRIN) 600 MG tablet, Take 600 mg by mouth (Patient not taking: Reported on 2022)  ondansetron (ZOFRAN-ODT) 8 MG ODT tab, Place 8 mg under the tongue (Patient not taking: Reported on 2022)  oxyCODONE-acetaminophen (PERCOCET) 5-325 MG tablet, Take 1 tablet by mouth (Patient not taking: Reported on 2022)  tamsulosin (FLOMAX) 0.4 MG capsule, Take 0.4 mg by mouth (Patient not taking: Reported on 2022)    No current facility-administered medications on file prior to visit.      Social History     Tobacco Use     Smoking status: Former Smoker     Packs/day: 0.50     Years: 3.00     Pack years: 1.50     Types: Cigarettes     Quit date: 2005     Years since quittin.0     Smokeless tobacco: Never Used   Substance Use Topics     Alcohol use: Yes     Alcohol/week: 8.3 standard drinks     Comment: 6 drinks a week       ROS:  CONSTITUTIONAL: Negative for fatigue or fever.  EYES: Negative for eye problems.  ENT: As above.  RESP: As above.  CV: Negative for chest pains.  GI: Negative for vomiting.  MUSCULOSKELETAL:  Negative for significant muscle or joint pains.  NEUROLOGIC: Negative for headaches.  SKIN: Negative for rash.  PSYCH: Normal mentation for age.    OBJECTIVE:  /78 (BP Location: Left arm, Patient Position: Chair, Cuff Size: Adult Large)   Pulse 93   Temp 97.3  F (36.3  C) (Oral)   Wt 103 kg (227 lb)   SpO2 98%   BMI 30.79  kg/m    GENERAL APPEARANCE: Healthy, alert and no distress.  Cranial nerves II through XII grossly intact.  Has inability to raise left eyebrow.  Left smile droop present.  Both are longstanding from breech delivery.    EYES:Conjunctiva/sclera clear.  Pupils equal reactive to light and accommodation.  EOMs intact.  Funduscopic exam grossly benign.  EARS: No cerumen.   Ear canals w/o erythema.  TM's intact w/o erythema.    NOSE/MOUTH: Nose without ulcers, erythema or lesions.  SINUSES: No maxillary sinus tenderness.  THROAT: No erythema w/o tonsillar enlargement . No exudates.  NECK: Supple, nontender, no lymphadenopathy.  RESP: Lungs clear to auscultation - no rales, rhonchi or wheezes  CV: Regular rate and rhythm, normal S1 S2, no murmur noted.  NEURO: Awake, alert    SKIN: No rashes  Abdomen-soft, nontender, normoactive bowel sounds.  No hepatosplenomegaly.  Negative pronator drift.  No tongue deviation.  Neg DixHallpike maneuver.    Nina Mckoy PA-C

## 2022-02-01 ENCOUNTER — OFFICE VISIT (OUTPATIENT)
Dept: FAMILY MEDICINE | Facility: CLINIC | Age: 47
End: 2022-02-01
Payer: COMMERCIAL

## 2022-02-01 VITALS
TEMPERATURE: 98.2 F | RESPIRATION RATE: 15 BRPM | HEIGHT: 72 IN | HEART RATE: 82 BPM | OXYGEN SATURATION: 97 % | DIASTOLIC BLOOD PRESSURE: 80 MMHG | SYSTOLIC BLOOD PRESSURE: 122 MMHG | BODY MASS INDEX: 31.29 KG/M2 | WEIGHT: 231 LBS

## 2022-02-01 DIAGNOSIS — Z86.79 HISTORY OF ATRIAL FIBRILLATION: ICD-10-CM

## 2022-02-01 DIAGNOSIS — F41.9 ANXIETY: Primary | ICD-10-CM

## 2022-02-01 DIAGNOSIS — R42 DIZZINESS: ICD-10-CM

## 2022-02-01 PROBLEM — I48.91 NEW ONSET ATRIAL FIBRILLATION (H): Status: RESOLVED | Noted: 2021-05-17 | Resolved: 2022-02-01

## 2022-02-01 PROCEDURE — 99214 OFFICE O/P EST MOD 30 MIN: CPT | Performed by: FAMILY MEDICINE

## 2022-02-01 RX ORDER — HYDROXYZINE PAMOATE 25 MG/1
CAPSULE ORAL
Qty: 30 CAPSULE | Refills: 3 | Status: SHIPPED | OUTPATIENT
Start: 2022-02-01 | End: 2022-02-10

## 2022-02-01 RX ORDER — HYDROXYZINE HYDROCHLORIDE 25 MG/1
25 TABLET, FILM COATED ORAL 3 TIMES DAILY PRN
COMMUNITY
End: 2022-02-10

## 2022-02-01 ASSESSMENT — MIFFLIN-ST. JEOR: SCORE: 1960.81

## 2022-02-01 NOTE — PROGRESS NOTES
Assessment & Plan     Anxiety  Refilled  Discussed meds   Pt wants to hold off on daily meds  - hydrOXYzine (VISTARIL) 25 MG capsule; 1/2-1  tablet at night    Dizziness  better    History of atrial fibrillation  Discussed if dizziness continues -I would recommend a holter      Return in about 1 month (around 3/1/2022) for recheck/ sooner if worse or New symptoms.    Claudia Solo MD  [facility] JEFFREY Donis is a 46 year old who presents for the following health issues  accompanied by his mother.    HPI     ED/UC Followup:    [facility]ility[facility]  Date of visit: 01/14/22  Reason for visit: lighthead  Current Status: still have lighthead     Pt says he has a Lot of anxiety and feels Dizzy when he gets very anxious  He has appointment for Therapy and Hopes That will help  He has Vistaril which he takes 1/2 tablet at Night and This has helped  No palpitations  He has had no atrial fib symptoms and checks on apple watch at Home  Urgent care Notes reviewed   He feels a Little better  No chest pain    Review of Systems   Rest of the ROS is Negative except see above and Problem list [stable]        Objective    /80   Pulse 82   Temp 98.2  F (36.8  C)   Resp 15   Ht 1.829 m (6')   Wt 104.8 kg (231 lb)   SpO2 97%   BMI 31.33 kg/m    Body mass index is 31.33 kg/m .  Physical Exam   GENERAL: healthy, alert and no distress  NECK: no adenopathy, no asymmetry, masses, or scars and thyroid normal to palpation  RESP: lungs clear to auscultation - no rales, rhonchi or wheezes  CV: regular rate and rhythm, normal S1 S2, no S3 or S4, no murmur, click or rub, no peripheral edema and peripheral pulses strong  ABDOMEN: soft, nontender, no hepatosplenomegaly, no masses and bowel sounds normal  MS: no gross musculoskeletal defects noted, no edema  NEURO: Normal strength and tone, sensory exam grossly normal, mentation intact, speech normal, cranial nerves 2-12 intact except left facial  weakness and Romberg normal  Pt has left facial weakness since Birth  PSYCH: anxious    Office Visit on 01/14/2022   Component Date Value Ref Range Status     Sodium 01/14/2022 141  133 - 144 mmol/L Final     Potassium 01/14/2022 4.2  3.4 - 5.3 mmol/L Final     Chloride 01/14/2022 108  94 - 109 mmol/L Final     Carbon Dioxide (CO2) 01/14/2022 25  20 - 32 mmol/L Final     Anion Gap 01/14/2022 8  3 - 14 mmol/L Final     Urea Nitrogen 01/14/2022 21  7 - 30 mg/dL Final     Creatinine 01/14/2022 1.18  0.66 - 1.25 mg/dL Final     Calcium 01/14/2022 8.8  8.5 - 10.1 mg/dL Final     Glucose 01/14/2022 98  70 - 99 mg/dL Final     Alkaline Phosphatase 01/14/2022 102  40 - 150 U/L Final     AST 01/14/2022 24  0 - 45 U/L Final     ALT 01/14/2022 40  0 - 70 U/L Final     Protein Total 01/14/2022 6.9  6.8 - 8.8 g/dL Final     Albumin 01/14/2022 3.9  3.4 - 5.0 g/dL Final     Bilirubin Total 01/14/2022 0.4  0.2 - 1.3 mg/dL Final     GFR Estimate 01/14/2022 77  >60 mL/min/1.73m2 Final    Effective December 21, 2021 eGFRcr in adults is calculated using the 2021 CKD-EPI creatinine equation which includes age and gender (Andrés et al., [facility], DOI: 10.1056/OLJTul8029419)     TSH 01/14/2022 0.55  0.40 - 4.00 mU/L Final     Hemoglobin A1C 01/14/2022 5.1  0.0 - 5.6 % Final    Normal <5.7%   Prediabetes 5.7-6.4%    Diabetes 6.5% or higher     Note: Adopted from ADA consensus guidelines.     WBC Count 01/14/2022 7.0  4.0 - 11.0 10e3/uL Final     RBC Count 01/14/2022 4.80  4.40 - 5.90 10e6/uL Final     Hemoglobin 01/14/2022 15.6  13.3 - 17.7 g/dL Final     Hematocrit 01/14/2022 45.8  40.0 - 53.0 % Final     MCV 01/14/2022 95  78 - 100 fL Final     MCH 01/14/2022 32.5  26.5 - 33.0 pg Final     MCHC 01/14/2022 34.1  31.5 - 36.5 g/dL Final     RDW 01/14/2022 12.5  10.0 - 15.0 % Final     Platelet Count 01/14/2022 156  150 - 450 10e3/uL Final     % Neutrophils 01/14/2022 69  % Final     % Lymphocytes 01/14/2022 17  % Final     % Monocytes 01/14/2022  11  % Final     % Eosinophils 01/14/2022 2  % Final     % Basophils 01/14/2022 1  % Final     % Immature Granulocytes 01/14/2022 0  % Final     Absolute Neutrophils 01/14/2022 4.8  1.6 - 8.3 10e3/uL Final     Absolute Lymphocytes 01/14/2022 1.2  0.8 - 5.3 10e3/uL Final     Absolute Monocytes 01/14/2022 0.7  0.0 - 1.3 10e3/uL Final     Absolute Eosinophils 01/14/2022 0.1  0.0 - 0.7 10e3/uL Final     Absolute Basophils 01/14/2022 0.1  0.0 - 0.2 10e3/uL Final     Absolute Immature Granulocytes 01/14/2022 0.0  <=0.4 10e3/uL Final

## 2022-02-01 NOTE — PATIENT INSTRUCTIONS
[facility]    If you have any questions regarding to your visit please contact your care team:       Team Red:   Clinic Hours Telephone Number   [address] Dr, Dr 7am-6pm  Monday - Thursday   7am-5pm  Fridays  [phone]  (Appointment scheduling available 24/7)    Questions about your recent visit?   Team Line  [phone]   Urgent Care - [place] and [facility] - 11am-8pm Monday-Friday Saturday-Sunday- 9am-5pm   [place] - 5pm-8pm Monday-Friday Saturday-Sunday- 9am-5pm  [phone] - [place]  [phone] - [place]       What options do I have for a visit other than an office visit? We offer electronic visits (e-visits) and telephone visits, when medically appropriate.  Please check with your medical insurance to see if these types of visits are covered, as you will be responsible for any charges that are not paid by your insurance.      You can use Att[employer] RTD (secure electronic communication) to access to your chart, send your primary care provider a message, or make an appointment. Ask a team member how to get started.     For a price quote for your services, please call our Consumer Price Line at [phone] or our Imaging Cost estimation line at [phone] (for imaging tests).

## 2022-02-07 RX ORDER — HYDROXYZINE HYDROCHLORIDE 25 MG/1
25 TABLET, FILM COATED ORAL 3 TIMES DAILY PRN
Status: CANCELLED | OUTPATIENT
Start: 2022-02-07

## 2022-02-07 NOTE — TELEPHONE ENCOUNTER
Faxed message from [facility]:    Emergency holdover provided.  Rx [id number].  [facility], located within [facility] gave 3 hydroxyzine HCL 25 mg tabs.    MD contacted for authorization on 02/04/2022!      I do not see any other messages, will forward to  refill.

## 2022-03-08 DIAGNOSIS — F41.9 ANXIETY: ICD-10-CM

## 2022-03-10 RX ORDER — HYDROXYZINE HYDROCHLORIDE 25 MG/1
TABLET, FILM COATED ORAL
Qty: 90 TABLET | Refills: 0 | Status: SHIPPED | OUTPATIENT
Start: 2022-03-10 | End: 2022-06-16

## 2022-03-10 NOTE — TELEPHONE ENCOUNTER
"Prescription approved per [facility] Refill Protocol.      Requested Prescriptions   Pending Prescriptions Disp Refills     hydrOXYzine (ATARAX) 25 MG tablet [Pharmacy Med Name: HYDROXYZINE HCL 25 MG TABLET] 30 tablet 0     Sig: TAKE 1/2-1 TABLETS BY MOUTH AT NIGHT       Antihistamines Protocol Passed - 3/8/2022 11:33 AM        Passed - Recent (12 mo) or future (30 days) visit within the authorizing provider's specialty     Patient has had an office visit with the authorizing provider or a provider within the authorizing providers department within the previous 12 mos or has a future within next 30 days. See \"Patient Info\" tab in inbasket, or \"Choose Columns\" in Meds & Orders section of the refill encounter.              Passed - Patient is age 3 or older     Apply age and/or weight-based dosing for peds patients age 3 and older.    Forward request to provider for patients under the age of 3.          Passed - Medication is active on med list           Myrna SAUCEDO RN on 3/10/2022 at 10:42 AM      "

## 2022-03-26 ENCOUNTER — MYC MEDICAL ADVICE (OUTPATIENT)
Dept: CARDIOLOGY | Facility: CLINIC | Age: 47
End: 2022-03-26
Payer: COMMERCIAL

## 2022-03-29 ASSESSMENT — ENCOUNTER SYMPTOMS
DIARRHEA: 0
PARESTHESIAS: 0
ARTHRALGIAS: 0
HEMATOCHEZIA: 0
CONSTIPATION: 0
EYE PAIN: 0
SORE THROAT: 0
PALPITATIONS: 0
HEARTBURN: 0
WEAKNESS: 0
SHORTNESS OF BREATH: 0
HEADACHES: 0
FEVER: 0
NERVOUS/ANXIOUS: 1
CHILLS: 0
DYSURIA: 0
ABDOMINAL PAIN: 0
JOINT SWELLING: 0
HEMATURIA: 0
FREQUENCY: 0
NAUSEA: 0
MYALGIAS: 0
COUGH: 0
DIZZINESS: 0

## 2022-03-31 ENCOUNTER — OFFICE VISIT (OUTPATIENT)
Dept: FAMILY MEDICINE | Facility: CLINIC | Age: 47
End: 2022-03-31
Payer: COMMERCIAL

## 2022-03-31 VITALS
OXYGEN SATURATION: 98 % | TEMPERATURE: 98 F | HEIGHT: 72 IN | BODY MASS INDEX: 31.48 KG/M2 | RESPIRATION RATE: 22 BRPM | WEIGHT: 232.4 LBS | HEART RATE: 70 BPM | DIASTOLIC BLOOD PRESSURE: 74 MMHG | SYSTOLIC BLOOD PRESSURE: 113 MMHG

## 2022-03-31 DIAGNOSIS — Z12.11 SCREEN FOR COLON CANCER: ICD-10-CM

## 2022-03-31 DIAGNOSIS — R06.83 SNORING: ICD-10-CM

## 2022-03-31 DIAGNOSIS — Z83.719 FAMILY HISTORY OF COLONIC POLYPS: ICD-10-CM

## 2022-03-31 DIAGNOSIS — I48.0 PAF (PAROXYSMAL ATRIAL FIBRILLATION) (H): ICD-10-CM

## 2022-03-31 DIAGNOSIS — Z00.01 ENCOUNTER FOR ROUTINE ADULT PHYSICAL EXAM WITH ABNORMAL FINDINGS: Primary | ICD-10-CM

## 2022-03-31 DIAGNOSIS — E78.5 HYPERLIPIDEMIA WITH TARGET LDL LESS THAN 160: ICD-10-CM

## 2022-03-31 PROBLEM — Z86.79 HISTORY OF ATRIAL FIBRILLATION: Status: RESOLVED | Noted: 2022-02-01 | Resolved: 2022-03-31

## 2022-03-31 PROCEDURE — 80061 LIPID PANEL: CPT | Performed by: FAMILY MEDICINE

## 2022-03-31 PROCEDURE — 36415 COLL VENOUS BLD VENIPUNCTURE: CPT | Performed by: FAMILY MEDICINE

## 2022-03-31 PROCEDURE — 99396 PREV VISIT EST AGE 40-64: CPT | Performed by: FAMILY MEDICINE

## 2022-03-31 PROCEDURE — 99213 OFFICE O/P EST LOW 20 MIN: CPT | Mod: 25 | Performed by: FAMILY MEDICINE

## 2022-03-31 RX ORDER — CHLORAL HYDRATE 500 MG
CAPSULE ORAL
COMMUNITY
Start: 2022-01-01

## 2022-03-31 RX ORDER — METOPROLOL TARTRATE 25 MG/1
12.5 TABLET, FILM COATED ORAL
COMMUNITY
Start: 2022-03-26 | End: 2022-03-31

## 2022-03-31 RX ORDER — METOPROLOL TARTRATE 25 MG/1
12.5 TABLET, FILM COATED ORAL 2 TIMES DAILY
COMMUNITY
Start: 2022-03-31 | End: 2022-06-10

## 2022-03-31 RX ORDER — UBIDECARENONE 100 MG
CAPSULE ORAL
COMMUNITY
Start: 2022-01-01

## 2022-03-31 ASSESSMENT — ENCOUNTER SYMPTOMS
COUGH: 0
WEAKNESS: 0
DIARRHEA: 0
PARESTHESIAS: 0
EYE PAIN: 0
JOINT SWELLING: 0
HEMATURIA: 0
HEADACHES: 0
FEVER: 0
SORE THROAT: 0
NERVOUS/ANXIOUS: 1
FREQUENCY: 0
SHORTNESS OF BREATH: 0
ARTHRALGIAS: 0
CONSTIPATION: 0
ABDOMINAL PAIN: 0
PALPITATIONS: 0
MYALGIAS: 0
HEMATOCHEZIA: 0
NAUSEA: 0
DIZZINESS: 0
DYSURIA: 0
HEARTBURN: 0
CHILLS: 0

## 2022-03-31 NOTE — PROGRESS NOTES
SUBJECTIVE:   CC: Gagandeep Oswald is an 47 year old male who presents for preventative health visit.     Patient has been advised of split billing requirements and indicates understanding: Yes  Healthy Habits:     Getting at least 3 servings of Calcium per day:  Yes    Bi-annual eye exam:  Yes    Dental care twice a year:  Yes    Sleep apnea or symptoms of sleep apnea:  Excessive snoring    Diet:  Regular (no restrictions)    Frequency of exercise:  2-3 days/week    Duration of exercise:  30-45 minutes    Taking medications regularly:  Yes    Medication side effects:  None    PHQ-2 Total Score: 0    Additional concerns today:  Yes    Pt would like to discuss referral for sleep study - wondering if afib is related to possible sleep apnea?     ED/UC Followup:    Facility:  [facility]  Date of visit: 3-26-22  Reason for visit: Atrial Fib with RVR s/p synchronized cardioversion  Current Status: stable          Today's PHQ-2 Score:   PHQ-2 (  [employer]) 3/29/2022   Q1: Little interest or pleasure in doing things 0   Q2: Feeling down, depressed or hopeless 0   PHQ-2 Score 0   PHQ-2 Total Score (12-17 Years)- Positive if 3 or more points; Administer PHQ-A if positive -   Q1: Little interest or pleasure in doing things Not at all   Q2: Feeling down, depressed or hopeless Not at all   PHQ-2 Score 0       Abuse: Current or Past(Physical, Sexual or Emotional)- No  Do you feel safe in your environment? Yes        Social History     Tobacco Use     Smoking status: Former Smoker     Packs/day: 0.50     Years: 3.00     Pack years: 1.50     Types: Cigarettes     Quit date: 2005     Years since quittin.2     Smokeless tobacco: Never Used   Substance Use Topics     Alcohol use: Yes     Alcohol/week: 8.3 standard drinks     Comment: 6 drinks a week     If you drink alcohol do you typically have >3 drinks per day or >7 drinks per week? No      AUDIT - Alcohol Use Disorders Identification Test - Reproduced from the World  Health Organization Audit 2001 (Second Edition) 4/7/2021   1.  How often do you have a drink containing alcohol? 4 or more times a week   2.  How many drinks containing alcohol do you have on a typical day when you are drinking? 1 or 2   3.  How often do you have five or more drinks on one occasion? Never   4.  How often during the last year have you found that you were not able to stop drinking once you had started? Never   5.  How often during the last year have you failed to do what was normally expected of you because of drinking? Never   6.  How often during the last year have you needed a first drink in the morning to get yourself going after a heavy drinking session? Never   7.  How often during the last year have you had a feeling of guilt or remorse after drinking? Never   8.  How often during the last year have you been unable to remember what happened the night before because of your drinking? Never   9.  Have you or someone else been injured because of your drinking? No   10. Has a relative, friend, doctor or other health care worker been concerned about your drinking or suggested you cut down? No   TOTAL SCORE 4     Reviewed orders with patient. Reviewed health maintenance and updated orders accordingly - Yes  Lab work is in process  Labs reviewed in EPIC  BP Readings from Last 3 Encounters:   03/31/22 113/74   02/01/22 122/80   01/14/22 131/78    Wt Readings from Last 3 Encounters:   03/31/22 105.4 kg (232 lb 6.4 oz)   02/01/22 104.8 kg (231 lb)   01/14/22 103 kg (227 lb)                  Patient Active Problem List   Diagnosis     Hyperlipidemia with target LDL less than 160     History of sinus surgery     Routine history and physical examination of adult     PAF (paroxysmal atrial fibrillation) (H)     Family history of colonic polyps     Past Surgical History:   Procedure Laterality Date     ENDOSCOPIC SINUS SURGERY  1/24/2013    Procedure: ENDOSCOPIC SINUS SURGERY;  Bilateral total ethmoidectomy,  bilateral endoscopic maxillary antrostomy;  Surgeon: Han Toledo MD;  Location: [facility] OR     ENT SURGERY  2013    Surgery to correct sinus infection     MOUTH SURGERY  2012    [place] Teeth Removed     VASECTOMY         Social History     Tobacco Use     Smoking status: Former Smoker     Packs/day: 0.50     Years: 3.00     Pack years: 1.50     Types: Cigarettes     Quit date: 2005     Years since quittin.2     Smokeless tobacco: Never Used   Substance Use Topics     Alcohol use: Yes     Alcohol/week: 8.3 standard drinks     Comment: 6 drinks a week     Family History   Problem Relation Age of Onset     Allergies Mother      Arthritis Mother      Breast Cancer Mother 71     Diabetes Mother      Obesity Mother      Pancreatic Cancer Mother         d age 80     Other Cancer Mother         Pancreatic     Obesity Brother      Diabetes Brother      C.A.D. Father 82        MI     Cancer Father         skin     Prostate Cancer Father      Kidney Cancer Father         d age 85     Hypertension Father      Other Cancer Father         Removed age 85     Obesity Sister      Obesity Sister      Colon Cancer No family hx of          Current Outpatient Medications   Medication Sig Dispense Refill     apixaban ANTICOAGULANT (ELIQUIS) 5 MG tablet Take 1 tablet (5 mg) by mouth 2 times daily       co-enzyme Q-10 100 MG CAPS capsule        fish oil-omega-3 fatty acids 1000 MG capsule        hydrOXYzine (ATARAX) 25 MG tablet TAKE 1/2-1 TABLETS BY MOUTH AT NIGHT 90 tablet 0     metoprolol tartrate (LOPRESSOR) 25 MG tablet Take 0.5 tablets (12.5 mg) by mouth 2 times daily       Multiple Vitamin (MULTIVITAMINS PO) Take  by mouth.       Allergies   Allergen Reactions     Nickel Itching and Rash       Reviewed and updated as needed this visit by clinical staff   Tobacco  Allergies  Meds   Med Hx  Surg Hx  Fam Hx  Soc Hx        Reviewed and updated as needed this visit by Provider                 Past Medical  History:   Diagnosis Date     Facial nerve palsy     LT congenital      Hyperlipidemia LDL goal < 160 5/7/2012     Nephrolithiasis     has had it 3 times-last one was in 2016     New onset atrial fibrillation (H) 5/17/2021     Varicocele     LT      Past Surgical History:   Procedure Laterality Date     ENDOSCOPIC SINUS SURGERY  1/24/2013    Procedure: ENDOSCOPIC SINUS SURGERY;  Bilateral total ethmoidectomy, bilateral endoscopic maxillary antrostomy;  Surgeon: Han Toledo MD;  Location: [facility] OR     ENT SURGERY  1/24/2013    Surgery to correct sinus infection     MOUTH SURGERY  6/2012    [place] Teeth Removed     VASECTOMY         Review of Systems   Constitutional: Negative for chills and fever.   HENT: Negative for congestion, ear pain, hearing loss and sore throat.    Eyes: Negative for pain and visual disturbance.   Respiratory: Negative for cough and shortness of breath.    Cardiovascular: Negative for chest pain, palpitations and peripheral edema.   Gastrointestinal: Negative for abdominal pain, constipation, diarrhea, heartburn, hematochezia and nausea.   Genitourinary: Negative for dysuria, frequency, genital sores, hematuria, impotence, penile discharge and urgency.   Musculoskeletal: Negative for arthralgias, joint swelling and myalgias.   Skin: Negative for rash.   Neurological: Negative for dizziness, weakness, headaches and paresthesias.   Psychiatric/Behavioral: Negative for mood changes. The patient is nervous/anxious.    Rest of the ROS is Negative except see above and Problem list [stable]  Pt has anxiety and sees a Therapist   OBJECTIVE:   /74 (Patient Position: Sitting, Cuff Size: Adult Large)   Pulse 70   Temp 98  F (36.7  C) (Temporal)   Resp 22   Ht 1.829 m (6')   Wt 105.4 kg (232 lb 6.4 oz)   SpO2 98%   BMI 31.52 kg/m      Physical Exam  GENERAL: healthy, alert and no distress  EYES: Eyes grossly normal to inspection, PERRL and conjunctivae and sclerae normal  HENT: ear  canals and TM's normal, nose and mouth without ulcers or lesions  NECK: no adenopathy, no asymmetry, masses, or scars and thyroid normal to palpation  RESP: lungs clear to auscultation - no rales, rhonchi or wheezes  CV: regular rate and rhythm, normal S1 S2, no S3 or S4, no murmur, click or rub, no peripheral edema and peripheral pulses strong  ABDOMEN: soft, nontender, no hepatosplenomegaly, no masses and bowel sounds normal  MS: no gross musculoskeletal defects noted, no edema  SKIN: no suspicious lesions or rashes  NEURO: Normal strength and tone, mentation intact and speech normal  PSYCH: mentation appears normal, affect normal/bright    Diagnostic Test Results:  Labs reviewed in Epic    ASSESSMENT/PLAN:   Routine general medical examination at a health care facility   With abnormalities  Comment:   Plan: normal     (E78.5) Hyperlipidemia with target LDL less than 160  Comment: labs pending   Plan: Lipid panel reflex to direct LDL Fasting            (I48.0) PAF (paroxysmal atrial fibrillation) (H)  Comment: on eliquis and Lopressor   Plan: in SR  Has appointment to see Cardiology    (Z12.11) Screen for colon cancer  Comment: advised colonoscopy due to Family History colon ca  He should check with Insurance about coverage  Plan: Adult Gastro Ref - Procedure Only            (R06.83) Snoring  Comment: referral done  Plan: Adult Sleep Eval & Management          Referral     Pt has used his apple watch and a oximeter and Notices his o2 levels go down when he sleeps   He also says he wakes up gasping for Breath     Will do sleep study    (Z83.71) Family history of colonic polyps  Comment:   Plan: Adult Gastro Ref - Procedure Only            COUNSELING:   Reviewed preventive health counseling, as reflected in patient instructions       Regular exercise       Healthy diet/nutrition       Colorectal cancer screening       The 10-year ASCVD risk score (Perryalma MCKENZIE Jr., et al., 2013) is: 2.6%    Values used to  calculate the score:      Age: 47 years      Sex: Male      Is Non- : No      Diabetic: No      Tobacco smoker: No      Systolic Blood Pressure: 113 mmHg      Is BP treated: No      HDL Cholesterol: 63 mg/dL      Total Cholesterol: 259 mg/dL       Advance Care Planning    Estimated body mass index is 31.52 kg/m  as calculated from the following:    Height as of this encounter: 1.829 m (6').    Weight as of this encounter: 105.4 kg (232 lb 6.4 oz).     Weight management plan: pt exercise and watches diet    He reports that he quit smoking about 17 years ago. His smoking use included cigarettes. He has a 1.50 pack-year smoking history. He has never used smokeless tobacco.      Counseling Resources:  ATP IV Guidelines  Pooled Cohorts Equation Calculator  FRAX Risk Assessment  ICSI Preventive Guidelines  Dietary Guidelines for Americans, 2010  USDA's MyPlate  ASA Prophylaxis  Lung CA Screening    Claudia Solo MD  [facility]

## 2022-04-01 LAB
CHOLEST SERPL-MCNC: 197 MG/DL
FASTING STATUS PATIENT QL REPORTED: YES
HDLC SERPL-MCNC: 50 MG/DL
LDLC SERPL CALC-MCNC: 122 MG/DL
NONHDLC SERPL-MCNC: 147 MG/DL
TRIGL SERPL-MCNC: 127 MG/DL

## 2022-04-04 ENCOUNTER — VIRTUAL VISIT (OUTPATIENT)
Dept: CARDIOLOGY | Facility: CLINIC | Age: 47
End: 2022-04-04
Payer: COMMERCIAL

## 2022-04-04 ENCOUNTER — TELEPHONE (OUTPATIENT)
Dept: GASTROENTEROLOGY | Facility: CLINIC | Age: 47
End: 2022-04-04

## 2022-04-04 DIAGNOSIS — I48.0 PAROXYSMAL ATRIAL FIBRILLATION (H): Primary | ICD-10-CM

## 2022-04-04 PROCEDURE — 99213 OFFICE O/P EST LOW 20 MIN: CPT | Mod: 95 | Performed by: INTERNAL MEDICINE

## 2022-04-04 NOTE — LETTER
4/4/2022      RE: Gagandeep Oswald  [address] [place] [address]       Dear Colleague,    Thank you for the opportunity to participate in the care of your patient, Gagandeep Oswald, at the [facility]. Please see a copy of my visit note below.    HPI: Purpose of visit: Follow-up after cardioversion for atrial fibrillation    Patient is a 47-year-old gentleman whom I met in June 2021 after his first episode of atrial fibrillation.  He does not have any risk factors for stroke such as hypertension, diabetes, coronary heart disease, previous TIA or stroke.  He reports a family history of atrial fibrillation involving 2 of his siblings.  After the visit in June 2021, we decided on a watchful waiting approach.    Patient had a second episode of atrial fibrillation on April 2, 2022.  He woke up in the morning with atrial fibrillation and rapid ventricular response.  He presented to the [facility] emergency department and underwent an electrical cardioversion which was successful in converting him to normal sinus rhythm.  He was started on apixaban after the cardioversion.  Patient cannot identify a trigger or precipitating factor for this episode of atrial fibrillation    PAST MEDICAL HISTORY:  Past Medical History:   Diagnosis Date     Facial nerve palsy     LT congenital      Hyperlipidemia LDL goal < 160 5/7/2012     Nephrolithiasis     has had it 3 times-last one was in 2016     New onset atrial fibrillation (H) 5/17/2021     Varicocele     LT       CURRENT MEDICATIONS:  Current Outpatient Medications   Medication Sig Dispense Refill     apixaban ANTICOAGULANT (ELIQUIS) 5 MG tablet Take 1 tablet (5 mg) by mouth 2 times daily       co-enzyme Q-10 100 MG CAPS capsule        fish oil-omega-3 fatty acids 1000 MG capsule        hydrOXYzine (ATARAX) 25 MG tablet TAKE 1/2-1 TABLETS BY MOUTH AT NIGHT 90 tablet 0      metoprolol tartrate (LOPRESSOR) 25 MG tablet Take 0.5 tablets (12.5 mg) by mouth 2 times daily       Multiple Vitamin (MULTIVITAMINS PO) Take  by mouth.         PAST SURGICAL HISTORY:  Past Surgical History:   Procedure Laterality Date     ENDOSCOPIC SINUS SURGERY  2013    Procedure: ENDOSCOPIC SINUS SURGERY;  Bilateral total ethmoidectomy, bilateral endoscopic maxillary antrostomy;  Surgeon: Han Toledo MD;  Location: [facility] OR     ENT SURGERY  2013    Surgery to correct sinus infection     MOUTH SURGERY  2012    [place] Teeth Removed     VASECTOMY         ALLERGIES:     Allergies   Allergen Reactions     Nickel Itching and Rash       FAMILY HISTORY:  - Premature coronary artery disease  + Atrial fibrillation  - Sudden cardiac death     SOCIAL HISTORY:  Social History     Tobacco Use     Smoking status: Former Smoker     Packs/day: 0.50     Years: 3.00     Pack years: 1.50     Types: Cigarettes     Quit date: 2005     Years since quittin.2     Smokeless tobacco: Never Used   Vaping Use     Vaping Use: Never used   Substance Use Topics     Alcohol use: Yes     Alcohol/week: 8.3 standard drinks     Comment: 6 drinks a week     Drug use: No       ROS:   Constitutional: No fever, chills, or sweats. Weight stable.   ENT: No visual disturbance, ear ache, epistaxis, sore throat.   Cardiovascular: As per HPI.   Respiratory: No cough, hemoptysis.    GI: No nausea, vomiting, hematemesis, melena, or hematochezia.   : No hematuria.   Integument: Negative.   Psychiatric: Negative.   Hematologic:  Easy bruising, no easy bleeding.  Neuro: Negative.   Endocrinology: No significant heat or cold intolerance   Musculoskeletal: No myalgia.    Exam:  There were no vitals taken for this visit.  GENERAL APPEARANCE: healthy, alert and no distress    Labs:  CBC RESULTS:   Lab Results   Component Value Date    WBC 7.0 2022    WBC 7.9 2021    RBC 4.80 2022    RBC 5.08 2021    HGB 15.6  01/14/2022    HGB 16.8 05/17/2021    HCT 45.8 01/14/2022    HCT 47.6 05/17/2021    MCV 95 01/14/2022    MCV 94 05/17/2021    MCH 32.5 01/14/2022    MCH 33.1 (H) 05/17/2021    MCHC 34.1 01/14/2022    MCHC 35.3 05/17/2021    RDW 12.5 01/14/2022    RDW 13.1 05/17/2021     01/14/2022     05/17/2021       BMP RESULTS:  Lab Results   Component Value Date     01/14/2022     05/17/2021    POTASSIUM 4.2 01/14/2022    POTASSIUM 4.4 05/17/2021    CHLORIDE 108 01/14/2022    CHLORIDE 111 (H) 05/17/2021    CO2 25 01/14/2022    CO2 23 05/17/2021    ANIONGAP 8 01/14/2022    ANIONGAP 6 05/17/2021    GLC 98 01/14/2022     (H) 05/17/2021    BUN 21 01/14/2022    BUN 14 05/17/2021    CR 1.18 01/14/2022    CR 1.07 05/17/2021    GFRESTIMATED 77 01/14/2022    GFRESTIMATED 82 05/17/2021    GFRESTBLACK >90 05/17/2021    BOYD 8.8 01/14/2022    BOYD 8.6 05/17/2021        INR RESULTS:  Lab Results   Component Value Date    INR 1.10 01/16/2013       Procedures:      Assessment and Plan:   Paroxysmal atrial fibrillation-second episode of atrial fibrillation on April 2    I discussed with patient the management options which include watchful waiting versus catheter ablation.  After an extensive discussion, we decided on watchful waiting and patient will let us know when he experiences another episode of atrial fibrillation.  He should continue his apixaban for 4 weeks after his cardioversion.  Patient will undergo sleep evaluation and he will schedule the appointment himself.    All questions and concerns were addressed and patient is happy with the plan.    Please do not hesitate to contact me if you have any questions/concerns.     Sincerely,     Darcie Wright MD      CC  Patient Care Team:  Claudia Solo MD as PCP - General (Family Practice)  Darcie Wright MD as Assigned Heart and Vascular Provider  Chucho Hernandez MD as Assigned Surgical Provider

## 2022-04-04 NOTE — PATIENT INSTRUCTIONS
Thank you for coming to the [facility] at [place]; please note the following instructions:    1. Follow up with Dr. Wright on an as needed basis        If you have any questions regarding your visit, please contact your care team:     CARDIOLOGY  TELEPHONE NUMBER   Kyleigh MONTGOMERYShellie, Registered Nurse  Katerina GILES, Registered Nurse  Angela DE SANTIAGO, Registered Medical Assistant  Nora YOUNG, Visit Facilitator [phone] (select option 1)    *After hours: [phone]   For Scheduling Appts:     [phone] (select option 1)    *After hours: [phone]   For the Device Clinic (Pacemakers and ICD's)  Araceli SIDDIQI, Registered Nurse   During business hours: [phone]    *After business hours:  [phone] (select option 4)      Normal test result notifications will be released via Click[employer] or mailed within 7 business days.  All other test results, will be communicated via telephone once reviewed by your cardiologist.    If you need a medication refill, please contact your pharmacy.  Please allow 3 business days for your refill to be completed.    As always, thank you for trusting us with your health care needs!

## 2022-04-04 NOTE — PROGRESS NOTES
Gagandeep is a 47 year old who is being evaluated via a billable video visit.      How would you like to obtain your AVS? [email or web address]  If the video visit is dropped, the invitation should be resent by: Other e-mail: Alyson  Will anyone else be joining your video visit? No      Video Start Time: 10:08 AM  Video-Visit Details    Type of service:  Video Visit    Video End Time: 10:24 AM    Originating Location (pt. Location): Home    Distant Location (provider location):  [facility]     Platform used for Video Visit: [email or web address]     HPI: Purpose of visit: Follow-up after cardioversion for atrial fibrillation    Patient is a 47-year-old gentleman whom I met in June 2021 after his first episode of atrial fibrillation.  He does not have any risk factors for stroke such as hypertension, diabetes, coronary heart disease, previous TIA or stroke.  He reports a family history of atrial fibrillation involving 2 of his siblings.  After the visit in June 2021, we decided on a watchful waiting approach.    Patient had a second episode of atrial fibrillation on April 2, 2022.  He woke up in the morning with atrial fibrillation and rapid ventricular response.  He presented to the [facility] emergency department and underwent an electrical cardioversion which was successful in converting him to normal sinus rhythm.  He was started on apixaban after the cardioversion.  Patient cannot identify a trigger or precipitating factor for this episode of atrial fibrillation    PAST MEDICAL HISTORY:  Past Medical History:   Diagnosis Date     Facial nerve palsy     LT congenital      Hyperlipidemia LDL goal < 160 5/7/2012     Nephrolithiasis     has had it 3 times-last one was in 2016     New onset atrial fibrillation (H) 5/17/2021     Varicocele     LT       CURRENT MEDICATIONS:  Current Outpatient Medications   Medication Sig Dispense Refill     apixaban ANTICOAGULANT (ELIQUIS) 5 MG tablet Take 1 tablet (5 mg) by  mouth 2 times daily       co-enzyme Q-10 100 MG CAPS capsule        fish oil-omega-3 fatty acids 1000 MG capsule        hydrOXYzine (ATARAX) 25 MG tablet TAKE 1/2-1 TABLETS BY MOUTH AT NIGHT 90 tablet 0     metoprolol tartrate (LOPRESSOR) 25 MG tablet Take 0.5 tablets (12.5 mg) by mouth 2 times daily       Multiple Vitamin (MULTIVITAMINS PO) Take  by mouth.         PAST SURGICAL HISTORY:  Past Surgical History:   Procedure Laterality Date     ENDOSCOPIC SINUS SURGERY  2013    Procedure: ENDOSCOPIC SINUS SURGERY;  Bilateral total ethmoidectomy, bilateral endoscopic maxillary antrostomy;  Surgeon: Han Toledo MD;  Location: [facility] OR     ENT SURGERY  2013    Surgery to correct sinus infection     MOUTH SURGERY  2012    [place] Teeth Removed     VASECTOMY         ALLERGIES:     Allergies   Allergen Reactions     Nickel Itching and Rash       FAMILY HISTORY:  - Premature coronary artery disease  + Atrial fibrillation  - Sudden cardiac death     SOCIAL HISTORY:  Social History     Tobacco Use     Smoking status: Former Smoker     Packs/day: 0.50     Years: 3.00     Pack years: 1.50     Types: Cigarettes     Quit date: 2005     Years since quittin.2     Smokeless tobacco: Never Used   Vaping Use     Vaping Use: Never used   Substance Use Topics     Alcohol use: Yes     Alcohol/week: 8.3 standard drinks     Comment: 6 drinks a week     Drug use: No       ROS:   Constitutional: No fever, chills, or sweats. Weight stable.   ENT: No visual disturbance, ear ache, epistaxis, sore throat.   Cardiovascular: As per HPI.   Respiratory: No cough, hemoptysis.    GI: No nausea, vomiting, hematemesis, melena, or hematochezia.   : No hematuria.   Integument: Negative.   Psychiatric: Negative.   Hematologic:  Easy bruising, no easy bleeding.  Neuro: Negative.   Endocrinology: No significant heat or cold intolerance   Musculoskeletal: No myalgia.    Exam:  There were no vitals taken for this visit.  GENERAL  APPEARANCE: healthy, alert and no distress    Labs:  CBC RESULTS:   Lab Results   Component Value Date    WBC 7.0 01/14/2022    WBC 7.9 05/17/2021    RBC 4.80 01/14/2022    RBC 5.08 05/17/2021    HGB 15.6 01/14/2022    HGB 16.8 05/17/2021    HCT 45.8 01/14/2022    HCT 47.6 05/17/2021    MCV 95 01/14/2022    MCV 94 05/17/2021    MCH 32.5 01/14/2022    MCH 33.1 (H) 05/17/2021    MCHC 34.1 01/14/2022    MCHC 35.3 05/17/2021    RDW 12.5 01/14/2022    RDW 13.1 05/17/2021     01/14/2022     05/17/2021       BMP RESULTS:  Lab Results   Component Value Date     01/14/2022     05/17/2021    POTASSIUM 4.2 01/14/2022    POTASSIUM 4.4 05/17/2021    CHLORIDE 108 01/14/2022    CHLORIDE 111 (H) 05/17/2021    CO2 25 01/14/2022    CO2 23 05/17/2021    ANIONGAP 8 01/14/2022    ANIONGAP 6 05/17/2021    GLC 98 01/14/2022     (H) 05/17/2021    BUN 21 01/14/2022    BUN 14 05/17/2021    CR 1.18 01/14/2022    CR 1.07 05/17/2021    GFRESTIMATED 77 01/14/2022    GFRESTIMATED 82 05/17/2021    GFRESTBLACK >90 05/17/2021    BOYD 8.8 01/14/2022    BOYD 8.6 05/17/2021        INR RESULTS:  Lab Results   Component Value Date    INR 1.10 01/16/2013       Procedures:      Assessment and Plan:   Paroxysmal atrial fibrillation-second episode of atrial fibrillation on April 2    I discussed with patient the management options which include watchful waiting versus catheter ablation.  After an extensive discussion, we decided on watchful waiting and patient will let us know when he experiences another episode of atrial fibrillation.  He should continue his apixaban for 4 weeks after his cardioversion.  Patient will undergo sleep evaluation and he will schedule the appointment himself.    All questions and concerns were addressed and patient is happy with the plan.    CC  Patient Care Team:  Claudia Solo MD as PCP - General (Family Practice)  Claudia Solo MD as Assigned PCP  Darcie Wright MD as Assigned Heart and Vascular  Provider  Chucho Hernandez MD as Assigned Surgical Provider

## 2022-04-04 NOTE — TELEPHONE ENCOUNTER
Screening Questions  BlueKIND OF PREP RedLOCATION [review exclusion criteria] GreenSEDATION TYPE  1. Have you had a positive covid test in the last 90 days? N     2. Are you active on mychart? Y    3. What insurance is in the chart? [employer] [place]      3.   Ordering/Referring Provider: DR. DEL REAL     4. BMI 31.5 [BMI OVER 40-EXTENDED PREP]  If greater than 40 review exclusion criteria [PAC APPT IF @ [facility]]        5.  Respiratory Screening :  [If yes to any of the following HOSPITAL setting only]     Do you use daily home oxygen? N    Do you have mod to severe Obstructive Sleep Apnea? N  [OKAY @ [facility] SH PH RI]   Do you have Pulmonary Hypertension? N     Do you have UNCONTROLLED asthma? N        6.   Have you had a heart or lung transplant? N      7.   Are you currently on dialysis? N [ If yes, G-PREP & HOSPITAL setting only]     8.   Do you have chronic kidney disease? N [ If yes, G-PREP ]    9.   Have you had a stroke or Transient ischemic attack (TIA - aka  mini stroke ) within 6 months?  N (If yes, please review exclusion criteria)    10.   In the past 6 months, have you had any heart related issues including cardiomyopathy or heart attack? N           If yes, did it require cardiac stenting or other implantable device? N      11.   Do you have any implantable devices in your body (pacemaker, defib, LVAD)? N (If yes, please review exclusion criteria)    12.   Do you take nitroglycerin? N           If yes, how often? N  (if yes, HOSPITAL setting ONLY)    13.   Are you currently taking any blood thinners? ELIQUIS FOR ANOTER 2 WKS            [IF YES, INFORM PATIENT TO FOLLOW UP W/ ORDERING PROVIDER FOR BRIDGING INSTRUCTIONS]     14.   Do you have a diagnosis of diabetes? N   [ If yes, G-PREP ]    15.   [FEMALES] Are you currently pregnant? NA    If yes, how many weeks? NA    16.   Are you taking any prescription pain medications on a routine schedule?  N  [ If yes, EXTENDED PREP.] [If yes, MAC]    17.   Do you  have any chemical dependencies such as alcohol, street drugs, or methadone?  N [If yes, MAC]    18.   Do you have any history of post-traumatic stress syndrome, severe anxiety or history of psychosis?  N  [If yes, MAC]    19.   Do you have a significant intellectual disability?  N [If yes, MAC]    20.   Do you transfer independently?  Y    21.  On a regular basis do you go 3-5 days between bowel movements? N   [ If yes, EXTENDED PREP.]    22.   Preferred LOCAL Pharmacy for Pre Prescription   [id number] [place] [address]      Scheduling Details      Caller : KARINA  (Please ask for phone number if not scheduled by patient)    Type of Procedure Scheduled: COLON  Which Colonoscopy Prep was Sent?: FRANCISCA PHELPS CF PATIENTS & GROEN'S PATIENTS NEEDS EXTENDED PREP  Surgeon: TAMARA   Date of Procedure: 5/9  Location:       Sedation Type: CS  Conscious Sedation- Needs  for 6 hours after the procedure  MAC/General-Needs  for 24 hours after procedure    Pre-op Required at [facility], [place], [place] and OR for MAC sedation: N  (advise patient they will need a pre-op prior to procedure -)      Informed patient they will need an adult  Y  Cannot take any type of public or medical transportation alone    Pre-Procedure Covid test to be completed at [facility] or Externally: Y    Confirmed Nurse will call to complete assessment Y    Additional comments: N

## 2022-04-04 NOTE — NURSING NOTE
Vitals - Patient Reported  Systolic (Patient Reported): 114  Diastolic (Patient Reported): 79  Weight (Patient Reported): 102.1 kg (225 lb)  Pulse (Patient Reported): 66    MARIELLE Ramirez

## 2022-04-05 DIAGNOSIS — Z11.59 ENCOUNTER FOR SCREENING FOR OTHER VIRAL DISEASES: Primary | ICD-10-CM

## 2022-05-16 ENCOUNTER — MYC MEDICAL ADVICE (OUTPATIENT)
Dept: FAMILY MEDICINE | Facility: CLINIC | Age: 47
End: 2022-05-16
Payer: COMMERCIAL

## 2022-05-16 DIAGNOSIS — Z13.41 ENCOUNTER FOR SCREENING FOR AUTISM: Primary | ICD-10-CM

## 2022-05-17 NOTE — TELEPHONE ENCOUNTER
Routing to provider to advise. Would pt contact mental health for this?    Destiney Jordan RN  [facility]

## 2022-06-01 ASSESSMENT — SLEEP AND FATIGUE QUESTIONNAIRES
HOW LIKELY ARE YOU TO NOD OFF OR FALL ASLEEP IN A CAR, WHILE STOPPED FOR A FEW MINUTES IN TRAFFIC: WOULD NEVER DOZE
HOW LIKELY ARE YOU TO NOD OFF OR FALL ASLEEP WHILE LYING DOWN TO REST IN THE AFTERNOON WHEN CIRCUMSTANCES PERMIT: HIGH CHANCE OF DOZING
HOW LIKELY ARE YOU TO NOD OFF OR FALL ASLEEP WHILE SITTING AND TALKING TO SOMEONE: WOULD NEVER DOZE
HOW LIKELY ARE YOU TO NOD OFF OR FALL ASLEEP WHILE WATCHING TV: SLIGHT CHANCE OF DOZING
HOW LIKELY ARE YOU TO NOD OFF OR FALL ASLEEP WHILE SITTING AND READING: MODERATE CHANCE OF DOZING
HOW LIKELY ARE YOU TO NOD OFF OR FALL ASLEEP WHILE SITTING QUIETLY AFTER LUNCH WITHOUT ALCOHOL: WOULD NEVER DOZE
HOW LIKELY ARE YOU TO NOD OFF OR FALL ASLEEP WHILE SITTING INACTIVE IN A PUBLIC PLACE: WOULD NEVER DOZE
HOW LIKELY ARE YOU TO NOD OFF OR FALL ASLEEP WHEN YOU ARE A PASSENGER IN A CAR FOR AN HOUR WITHOUT A BREAK: HIGH CHANCE OF DOZING

## 2022-06-02 PROBLEM — N20.0 KIDNEY STONE: Status: ACTIVE | Noted: 2021-09-20

## 2022-06-02 PROBLEM — N20.0 KIDNEY STONE: Status: RESOLVED | Noted: 2021-09-20 | Resolved: 2022-06-02

## 2022-06-02 PROBLEM — I48.0 PAF (PAROXYSMAL ATRIAL FIBRILLATION) (H): Chronic | Status: ACTIVE | Noted: 2022-03-31

## 2022-06-03 ENCOUNTER — VIRTUAL VISIT (OUTPATIENT)
Dept: SLEEP MEDICINE | Facility: CLINIC | Age: 47
End: 2022-06-03
Attending: FAMILY MEDICINE
Payer: COMMERCIAL

## 2022-06-03 VITALS — WEIGHT: 220 LBS | BODY MASS INDEX: 28.23 KG/M2 | HEIGHT: 74 IN

## 2022-06-03 DIAGNOSIS — F41.9 ANXIETY: Chronic | ICD-10-CM

## 2022-06-03 DIAGNOSIS — R06.83 SNORING: ICD-10-CM

## 2022-06-03 DIAGNOSIS — R06.89 DYSPNEA AND RESPIRATORY ABNORMALITY: Primary | ICD-10-CM

## 2022-06-03 DIAGNOSIS — I48.0 PAF (PAROXYSMAL ATRIAL FIBRILLATION) (H): ICD-10-CM

## 2022-06-03 DIAGNOSIS — R06.00 DYSPNEA AND RESPIRATORY ABNORMALITY: Primary | ICD-10-CM

## 2022-06-03 DIAGNOSIS — Z72.820 LACK OF ADEQUATE SLEEP: ICD-10-CM

## 2022-06-03 DIAGNOSIS — R53.83 MALAISE AND FATIGUE: ICD-10-CM

## 2022-06-03 DIAGNOSIS — R53.81 MALAISE AND FATIGUE: ICD-10-CM

## 2022-06-03 PROCEDURE — 99244 OFF/OP CNSLTJ NEW/EST MOD 40: CPT | Mod: 95 | Performed by: PHYSICIAN ASSISTANT

## 2022-06-03 ASSESSMENT — PAIN SCALES - GENERAL: PAINLEVEL: NO PAIN (0)
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
## 2022-06-03 NOTE — PATIENT INSTRUCTIONS
MY CONTACT NUMBERS ARE: [phone]  DOCTOR : SHIRLEY Rodriguez  SLEEP CENTER :   CPAP EQUIPMENT :    IF I HAVE SLEEP APNEA.....  WHERE CAN I FIND MORE INFORMATION?    American Academy of Sleep Medicine Patient information on sleep disorders:  http://yoursleep.aasmnet.org    THINGS TO REMEMBER  In most situations, sleep apnea is a lifelong disease that must be managed with daily therapy. Untreated disease, when severe, may result in an increased risk for an array of problems from heart disease to mood changes, car accidents and shorter lifespan.    CPAP -  WHY AND HOW?  Continuous positive airway pressure, or CPAP, is the most effective treatment for obstructive sleep apnea. A decision to use CPAP is a major step forward in the pursuit of a healthier life. The successful use of CPAP will help you breathe easier, sleep better and live healthier. Using CPAP can be a positive experience if you keep these jean points in mind:  Commitment  CPAP is not a quick fix for your problem. It involves a long-term commitment to improve your sleep and your health.    Communication  Stay in close communication with both your sleep doctor and your CPAP supplier. Ask lots of questions and seek help when you need it.    Consistency  Use CPAP all night, every night and for every nap. You will receive the maximum health benefits from CPAP when you use it every time that you sleep. This will also make it easier for your body to adjust to the treatment.    Correction  The first machine and mask that you try may not be the best ones for you. Work with your sleep doctor and your CPAP supplier to make corrections to your equipment selection. Ask about trying a different type of machine or mask if you have ongoing problems. Make sure that your mask is a good fit and learn to use your equipment properly.    Challenge  Tell a family member or close friend to ask you each morning if you used your CPAP the previous night. Have someone to  "challenge you to give it your best effort.    Connection   Your adjustment to CPAP will be easier if you are able to connect with others who use the same treatment. Ask your sleep doctor if there is a support group in your area for people who have sleep apnea, or look for one on the Internet.    Comfort   Increase your level of comfort by using a saline spray, decongestant or heated humidifier if CPAP irritates your nose, mouth or throat. Use your unit's \"ramp\" setting to slowly get used to the air pressure level. There may be soft pads you can buy that will fit over your mask straps. Look on www.CPAP.com for accessories such as these straps, a pillow contoured for side-sleeping with CPAP, longer hoses, hose covers to reduce condensation, or stands to keep the hose out of your way.                                 Cleaning   Clean your mask, tubing and headgear on a regular basis. Put this time in your schedule so that you don't forget to do it. Check and replace the filters for your CPAP unit and humidifier.    Completion   Although you are never finished with CPAP therapy, you should reward yourself by celebrating the completion of your first month of treatment. Expect this first month to be your hardest period of adjustment. It will involve some trial and error as you find the machine, mask and pressure settings that are right for you.    Continuation  After your first month of treatment, continue to make a daily commitment to use your CPAP all night, every night and for every nap.    CPAP-Tips to starting with success:  Begin using your CPAP for short periods of time during the day while you watch TV or read.    Use CPAP every night and for every nap. Using it less often reduces the health benefits and makes it harder for your body to get used to it.    Newer CPAP models are virtually silent; however, if you find the sound of your CPAP machine to be bothersome, place the unit under your bed to dampen the sound. "     Make small adjustments to your mask, tubing, straps and headgear until you get the right fit. Tightening the mask may actually worsen the leak.  If it leaves significant marks on your face or irritates the bridge of your nose, it may not be the best mask for you.  Speak with the person who supplied the mask and consider trying other masks.    Use a saline nasal spray to ease mild nasal congestion. Neti-Pot or saline nasal rinses may also help. Nasal gel sprays can help reduce nasal dryness.  Biotene mouthwash can be helpful to protect your teeth if you experience frequent dry mouth.  Dry mouth may be a sign of air escaping out of your mouth or out of the mask in the case of a full face mask.  Speak with your provider if you expect that is the case.     Take a nasal decongestant to relieve more severe nasal or sinus congestion.  Do not use Afrin (oxymetazoline) nasal spray more than 3 days in a row.  Speak with your sleep doctor if your nasal congestion is chronic.    Use a heated humidifier that fits your CPAP model to enhance your breathing comfort. Adjust the heat setting up if you get a dry nose or throat, down if you get condensation in the hose or mask.  Position the CPAP lower than you so that any condensation in the hose drains back into the machine rather than towards the mask.    Try a system that uses nasal pillows if traditional masks give you problems.    Clean your mask, tubing and headgear once a week. Make sure the equipment dries fully.    Regularly check and replace the filters for your CPAP unit and humidifier.    Work closely with your sleep doctor and your CPAP supplier to make sure that you have the machine, mask and air pressure setting that works best for you.    BESIDES CPAP, WHAT OTHER THERAPIES ARE THERE?  Postioning devices if you only have the problem on your back  Dental devices if your condition is mild  Nasal valves may be effective though experience is limited  Tongue Retaining  Device if missing teeth precludes the use of a dental device  Weight loss if you are overweight  Surgery in limited cases where devices are not acceptable or there are problems with structures in the nose and throat  If treated with one of these alternative options, further evaluation is necessary to ensure that the therapy is effective. This may require some form of testing.     Healthy Lifestyle:  Healthy diet, exercise and Limit alcohol: Not only will excessive alcohol increase your weight over time, but it irritates the throat tissues and make them swell, shrinking the airway and causing snoring. Drinking alcohol should be limited and stopped within 3-4 hours before going to bed.   Stop smoking: (Red swollen throat, heat, nicotine), also irritates and swells the airway, among numerous other negative health consequences.    Positioning Device  This example shows a pillow that straps around the waist. It may be appropriate for those whose sleep study shows milder sleep apnea that occurs primarily when lying flat on one's back. Preliminary studies have shown benefit but effectiveness at home should be verified.    Nasal Valves              Nasal valves may not be effective if you have frequent nasal congestion or have difficulty breathing through your nose. They may be an option for mild apnea if other options are not well tolerated. The efficacy of these devices is generally less than CPAP or oral appliances.  Oral Appliance  These are examples of two of many custom-made devices that are more likely to work in mild sleep apnea  Oral appliances are dental mouth pieces that fit very much like a sports mouth guards or removable orthodontic retainers. They are used to treat snoring  And obstructive sleep apnea . The device prevents the airway from collapsing by either holding the tongue or supporting the jaw in a forward position. Since oral appliances are non-invasive and easy to use, they may be considered as an  early treatment option. Oral appliance therapy (OAT) involves the customization, selection, fabrication, fitting, adjustments and long-term follow-up care of specially designed oral devices, worn during sleep, which reposition the lower jaw and tongue base forward to maintain an open airway.  Custom made oral appliances are proven to be more effective than over-the-counter devices. Therefore, the over-the-counter devices are recommended not to be used as a screening tool nor as a therapeutic option.  Who gets a dental device?  Oral appliance therapy can be used as an alternative to  CPAP therapy for the treatment of mild to moderate sleep apnea and for those patients who prefer OAT to CPAP. Oral appliance therapy is a first line therapy for the treatment of primary snoring. Additionally, OAT is an option for those that cannot tolerate CPAP as therapy or who have experienced insufficient surgical results.    Possible side effects?  Frequent but minor side effects include: excessive salivation, dry mouth, discomfort of teeth and jaw and temporary changes in the patient s bite.  Potential complications include: jaw pain, permanent occlusal changes and TMJ symptoms.  The above mentioned side effects and complications can be recognized and managed by dentists trained in dental sleep medicine.    Finding a dentist that practices dental sleep medicine  Specific training is available through the American Academy of Dental Sleep Medicine for dentists interested in working in the field of sleep. To find a dentist who is educated in the field of sleep and the use of oral appliances, near you, visit the Web site of the American Academy of Dental Sleep Medicine; also see http://www.accpstorage.org/newOrganization/patients/oralAppliances.pdf   To search for a dentist certified in these practices:  Http://aadsm.org/FindADentist.aspx?1  Http://www.accpstorage.org/newOrganization/patients/oralAppliances.pdf    Tongue Retaining  Device               Tongue Retaining Devices are devices that generally  suction cup  onto the tongue preventing it from falling back into the back of the throat during sleep.  They may be an option for people missing teeth, but can be uncomfortable. This particular device can be purchased online, but similar devices made by dentists fit more precisely and may be tolerated better. In general, they are rarely effective and often not very well tolerated.    Weight Loss:  Some patients may experience reduction or elimination of sleep apnea with weight loss.  Though there are significant health benefits from weight loss, long-term weight loss is very difficult to achieve- studies show success with dietary management in less that 10% of people.     If you are interested in dietary weight loss, you should review the options discussed at the National Institutes of Health patient information site:     Http:/www.health.nih.gov/topic/WeightLossDieting    Bariatric programs offer counseling in all methods of weight loss:    Http:/www.[facility].org/Specialties/WeightLossSurgeryandMedicalMgmt/htm    Surgery:  There are a number of surgeries that have been attempted to treat apnea. In general, surgical options are usually reserved for cases in which there is a physical abnormality contributing to obstruction or other treatment options are ineffective or not tolerated. Most surgical options are either unreliable or quite invasive. One of the more common procedures is:  Uvulopalatopharyngoplasty: In this procedure, the uvula (the finger-like tissue that hangs in the back of the throat), part of the soft palate (the tissue that the uvula is attached to), and sometimes the tonsils or adenoids are removed. The efficacy of this surgery is around 30-50% .  After surgery, complications may include:  Sleepiness and sleep apnea related to post-surgery medication   Swelling, infection and bleeding   A sore throat and/or difficulty  "swallowing   Drainage of secretions into the nose and a nasal quality to the voice. English language speech does not seem to be affected by this surgery.   Narrowing of the airway in the nose and throat (hence constricting breathing) snoring and even iatrogenically caused sleep apnea. By cutting the tissues, excess scar tissue can \"tighten\" the airway and make it even smaller than it was before UPPP.  Patients who have had the uvula removed will become unable to correctly speak Vietnamese or any other language that has a uvular 'r' phoneme.    Surgeries to help resolve nasal congestion may help reduce the severity of apnea slightly. Nasal congestion does not cause apnea on its own, so these surgeries are usually not performed just for ALAYNA.  They may be worth considering if the nasal congestion is significantly bothersome independent of apnea.   "

## 2022-06-10 ENCOUNTER — LAB (OUTPATIENT)
Dept: LAB | Facility: CLINIC | Age: 47
End: 2022-06-10
Attending: SURGERY
Payer: COMMERCIAL

## 2022-06-10 DIAGNOSIS — Z11.59 ENCOUNTER FOR SCREENING FOR OTHER VIRAL DISEASES: ICD-10-CM

## 2022-06-10 PROCEDURE — U0005 INFEC AGEN DETEC AMPLI PROBE: HCPCS

## 2022-06-10 PROCEDURE — U0003 INFECTIOUS AGENT DETECTION BY NUCLEIC ACID (DNA OR RNA); SEVERE ACUTE RESPIRATORY SYNDROME CORONAVIRUS 2 (SARS-COV-2) (CORONAVIRUS DISEASE [COVID-19]), AMPLIFIED PROBE TECHNIQUE, MAKING USE OF HIGH THROUGHPUT TECHNOLOGIES AS DESCRIBED BY CMS-2020-01-R: HCPCS

## 2022-06-11 LAB — SARS-COV-2 RNA RESP QL NAA+PROBE: NEGATIVE

## 2022-06-13 ENCOUNTER — HOSPITAL ENCOUNTER (OUTPATIENT)
Facility: AMBULATORY SURGERY CENTER | Age: 47
Discharge: HOME | End: 2022-06-13
Attending: SURGERY | Admitting: SURGERY
Payer: COMMERCIAL

## 2022-06-13 VITALS
DIASTOLIC BLOOD PRESSURE: 83 MMHG | HEART RATE: 70 BPM | SYSTOLIC BLOOD PRESSURE: 112 MMHG | RESPIRATION RATE: 16 BRPM | OXYGEN SATURATION: 95 % | TEMPERATURE: 96.7 F

## 2022-06-13 LAB — COLONOSCOPY: NORMAL

## 2022-06-13 PROCEDURE — G8918 PT W/O PREOP ORDER IV AB PRO: HCPCS

## 2022-06-13 PROCEDURE — 99152 MOD SED SAME PHYS/QHP 5/>YRS: CPT | Mod: 59 | Performed by: SURGERY

## 2022-06-13 PROCEDURE — 88305 TISSUE EXAM BY PATHOLOGIST: CPT | Performed by: PATHOLOGY

## 2022-06-13 PROCEDURE — 45385 COLONOSCOPY W/LESION REMOVAL: CPT

## 2022-06-13 PROCEDURE — 45385 COLONOSCOPY W/LESION REMOVAL: CPT | Mod: PT | Performed by: SURGERY

## 2022-06-13 PROCEDURE — G8907 PT DOC NO EVENTS ON DISCHARG: HCPCS

## 2022-06-13 RX ORDER — ONDANSETRON 2 MG/ML
4 INJECTION INTRAMUSCULAR; INTRAVENOUS EVERY 6 HOURS PRN
Status: DISCONTINUED | OUTPATIENT
Start: 2022-06-13 | End: 2022-06-14 | Stop reason: HOSPADM

## 2022-06-13 RX ORDER — NALOXONE HYDROCHLORIDE 0.4 MG/ML
0.2 INJECTION, SOLUTION INTRAMUSCULAR; INTRAVENOUS; SUBCUTANEOUS
Status: DISCONTINUED | OUTPATIENT
Start: 2022-06-13 | End: 2022-06-14 | Stop reason: HOSPADM

## 2022-06-13 RX ORDER — PROCHLORPERAZINE MALEATE 10 MG
10 TABLET ORAL EVERY 6 HOURS PRN
Status: DISCONTINUED | OUTPATIENT
Start: 2022-06-13 | End: 2022-06-14 | Stop reason: HOSPADM

## 2022-06-13 RX ORDER — ONDANSETRON 4 MG/1
4 TABLET, ORALLY DISINTEGRATING ORAL EVERY 6 HOURS PRN
Status: DISCONTINUED | OUTPATIENT
Start: 2022-06-13 | End: 2022-06-14 | Stop reason: HOSPADM

## 2022-06-13 RX ORDER — FENTANYL CITRATE 50 UG/ML
INJECTION, SOLUTION INTRAMUSCULAR; INTRAVENOUS PRN
Status: DISCONTINUED | OUTPATIENT
Start: 2022-06-13 | End: 2022-06-13 | Stop reason: HOSPADM

## 2022-06-13 RX ORDER — LIDOCAINE 40 MG/G
CREAM TOPICAL
Status: DISCONTINUED | OUTPATIENT
Start: 2022-06-13 | End: 2022-06-14 | Stop reason: HOSPADM

## 2022-06-13 RX ORDER — NALOXONE HYDROCHLORIDE 0.4 MG/ML
0.4 INJECTION, SOLUTION INTRAMUSCULAR; INTRAVENOUS; SUBCUTANEOUS
Status: DISCONTINUED | OUTPATIENT
Start: 2022-06-13 | End: 2022-06-14 | Stop reason: HOSPADM

## 2022-06-13 RX ORDER — FLUMAZENIL 0.1 MG/ML
0.2 INJECTION, SOLUTION INTRAVENOUS
Status: DISCONTINUED | OUTPATIENT
Start: 2022-06-13 | End: 2022-06-14 | Stop reason: HOSPADM

## 2022-06-13 NOTE — LETTER
[facility]           [address] [place] [address]           Tel [phone]  Gagandeep Oswald  [address] [place] [address] [address] [place] [address]      June 17, 2022    Dear Gagandeep,  This letter is to inform you of the results of your pathology report on your colonoscopy.  If you have questions please feel free to call my assistant  At [phone] .    Your pathology report was:  Showed an Adenomatous polyp. This is a benign (not cancerous) growth; however these can become cancer over time. This polyp is usually removed completely at the time of the biopsy. Because it is an Adenomatous polyp you do have a slight higher risk for colon cancer. This is why you will need a repeat colonoscopy in approximately 5 years.  If you do have further questions please don t hesitate to call the below number.    To make an appointment call [phone]: .   Sincerely,     Mukund Hayes M.D.  ___

## 2022-06-13 NOTE — H&P
ENDOSCOPY PRE-SEDATION H&P FOR OUTPATIENT PROCEDURES    Gagandeep Oswald  [id number]  1975    Procedure:   Colonoscopy possible biopsy, possible polypectomy, with moderate sedation.     Pre-procedure diagnosis: brother with colon polyps    Past medical history:   Past Medical History:   Diagnosis Date     Facial nerve palsy     LT congenital      Hyperlipidemia LDL goal < 160 5/7/2012     Kidney stone 9/20/2021     Nephrolithiasis     has had it 3 times-last one was in 2016     New onset atrial fibrillation (H) 5/17/2021     Varicocele     LT       Past surgical history:   Past Surgical History:   Procedure Laterality Date     ENDOSCOPIC SINUS SURGERY  1/24/2013    Procedure: ENDOSCOPIC SINUS SURGERY;  Bilateral total ethmoidectomy, bilateral endoscopic maxillary antrostomy;  Surgeon: Han Toledo MD;  Location: [facility] OR     ENT SURGERY  1/24/2013    Surgery to correct sinus infection     MOUTH SURGERY  6/2012    [place] Teeth Removed     VASECTOMY         Current Outpatient Medications   Medication     hydrOXYzine (ATARAX) 25 MG tablet     co-enzyme Q-10 100 MG CAPS capsule     fish oil-omega-3 fatty acids 1000 MG capsule     Multiple Vitamin (MULTIVITAMINS PO)     Current Facility-Administered Medications   Medication     lidocaine (LMX4) kit     lidocaine 1 % 0.1-1 mL     sodium chloride (PF) 0.9% PF flush 3 mL     sodium chloride (PF) 0.9% PF flush 3 mL       Allergies   Allergen Reactions     Nickel Itching and Rash       History of Anesthesia/Sedation Problems: no    Physical Exam:    Mental status: alert  Heart: Normal  Lung: Normal  Assessment of patient's airway: Normal  Other as pertinent for procedure: None     ASA Score: See Provation note    Mallampati score:  I - Faucial pillars, soft palate, and uvula are visible    Assessment/Plan:     The patient is an appropriate candidate to receive sedation.    Informed consent was discussed with the patient/family, including the risks, benefits,  potential complications and any alternative options associated with sedation.    Patient assessment completed just prior to sedation and while under constant observation by the provider. Condition determined to be adequate for proceeding with sedation.    The specific risks for the procedure were discussed with the patient at the time of informed consent and include but are not limited to perforation which could require surgery, missing significant neoplasm or lesion, hemorrhage and adverse sedative complication.      Mukund Hayes MD

## 2022-06-15 DIAGNOSIS — F41.9 ANXIETY: ICD-10-CM

## 2022-06-16 LAB
PATH REPORT.COMMENTS IMP SPEC: NORMAL
PATH REPORT.COMMENTS IMP SPEC: NORMAL
PATH REPORT.FINAL DX SPEC: NORMAL
PATH REPORT.GROSS SPEC: NORMAL
PATH REPORT.MICROSCOPIC SPEC OTHER STN: NORMAL
PATH REPORT.RELEVANT HX SPEC: NORMAL
PHOTO IMAGE: NORMAL

## 2022-06-16 RX ORDER — HYDROXYZINE HYDROCHLORIDE 25 MG/1
TABLET, FILM COATED ORAL
Qty: 90 TABLET | Refills: 0 | Status: SHIPPED | OUTPATIENT
Start: 2022-06-16 | End: 2022-09-15

## 2022-06-16 NOTE — TELEPHONE ENCOUNTER
History     Chief Complaint   Patient presents with     Depression     anxiety, gotten worse lately, not taking medication since December, suicidal thoughts - not wanting to wake up     Alcohol Intoxication     last drink this am 10 am, whiskey     The history is provided by the patient and medical records.     Sidney Varghese is a 51 year old male who comes in due to him wanting to get hospitalized in order to get into CD treatment.  He was at [facility] yesterday with the same complaints other than no suicidal thoughts.  There were no detox beds available and he declined a [facility] detox.  He called yesterday the RN triage line after being discharged and stated he is going to state he is suicidal so they have to admit him. He comes in today stating he is drinking a liter of Whiskey a day for the last 6 months. He was committed due to suicidal threats and drinking a year ago.  He was sober for 6 months while on commitment but then relapsed after commitment.  He gave the triage RN some vague thoughts of not wanting to wake up, but admits to this provider and the  he does not have those thoughts and he said that in order to try to get in the hospital to expedite the process of getting into treatment. He went to a [facility] a month ago but lied to them about his use so they recommended outpatient treatment.  He has a  who recommended that he go back to the [facility]  and tell the truth so they can get a recommendation for treatment.  The PO said he would help Sidney get into a program.  He denies any suicidal or homicidal thoughts. He denies any withdrawal symptoms now or in the past. He denies any seizures.      Please see the 's assessment in [facility] from today (11/2/18) for further details.    I have reviewed the Medications, Allergies, Past Medical and Surgical History, and Social History in the Epic system.    Review of Systems   Constitutional: Negative for fever.   Respiratory:  "Requested Prescriptions   Signed Prescriptions Disp Refills    hydrOXYzine (ATARAX) 25 MG tablet 90 tablet 0     Sig: TAKE 1/2-1 TABLET BY MOUTH AT NIGHT       Antihistamines Protocol Passed - 6/15/2022 12:33 AM        Passed - Recent (12 mo) or future (30 days) visit within the authorizing provider's specialty     Patient has had an office visit with the authorizing provider or a provider within the authorizing providers department within the previous 12 mos or has a future within next 30 days. See \"Patient Info\" tab in inbasket, or \"Choose Columns\" in Meds & Orders section of the refill encounter.              Passed - Patient is age 3 or older     Apply age and/or weight-based dosing for peds patients age 3 and older.    Forward request to provider for patients under the age of 3.          Passed - Medication is active on med list           Deloris Ragsdale RN  [facility]     " Negative for shortness of breath.    Cardiovascular: Negative for chest pain.   Gastrointestinal: Negative for abdominal pain.   Psychiatric/Behavioral: Negative for dysphoric mood, hallucinations, self-injury and suicidal ideas (made passive comments earlier in the visit but then retracted them later). The patient is not nervous/anxious.    All other systems reviewed and are negative.      Physical Exam   BP: 125/90  Pulse: 113  Heart Rate: 117  Temp: 98.8  F (37.1  C)  Resp: 18  Weight: 77.1 kg (170 lb)  SpO2: 96 %      Physical Exam   Constitutional: He is oriented to person, place, and time. He appears well-developed and well-nourished.   Cardiovascular: Normal rate, regular rhythm and normal heart sounds.    Pulmonary/Chest: Effort normal and breath sounds normal. No respiratory distress.   Neurological: He is alert and oriented to person, place, and time.   Psychiatric: He has a normal mood and affect. His speech is normal and behavior is normal. Thought content normal. He is not actively hallucinating. Thought content is not paranoid and not delusional. Cognition and memory are normal. He expresses inappropriate judgment. He expresses no homicidal and no suicidal ideation. He expresses no suicidal plans and no homicidal plans.   Sidney is a 52 y/o male who looks his age.  He is slightly disheveled and smells of alcohol.  He has good eye contact.   Nursing note and vitals reviewed.      ED Course     ED Course     Procedures               Labs Ordered and Resulted from Time of ED Arrival Up to the Time of Departure from the ED   ALCOHOL BREATH TEST POCT - Abnormal; Notable for the following:        Result Value    Alcohol Breath Test 0.188 (*)     All other components within normal limits   DRUG ABUSE SCREEN 6 CHEM DEP URINE ([facility])            Assessments & Plan (with Medical Decision Making)   Sidney will be discharged home with his girlfriend.  He is not an imminent risk to himself or others. He clearly  states he does not have suicidal thoughts and that this was only to try to get into the hospital like he did previously in order to get into treatment faster.  He denies any withdrawal symptoms now or in the past. He is not particularly interested in detox but he was given [facility] number for detox bed availability.  There are no beds currently available.  He declined for us to see if there were beds at [address] [place] [address] or [facility] for detox.  He decided he would go back to his [facility] , tell the truth and get into CD treatment.      I have reviewed the nursing notes.    I have reviewed the findings, diagnosis, plan and need for follow up with the patient.    New Prescriptions    No medications on file       Final diagnoses:   Acute alcoholic intoxication in alcoholism without complication (H)       11/2/2018   [facility], [facility], EMERGENCY DEPARTMENT     Dexter Bowser MD  11/02/18 [id number]

## 2022-08-04 ENCOUNTER — OFFICE VISIT (OUTPATIENT)
Dept: SLEEP MEDICINE | Facility: CLINIC | Age: 47
End: 2022-08-04
Attending: PHYSICIAN ASSISTANT
Payer: COMMERCIAL

## 2022-08-04 DIAGNOSIS — R53.83 MALAISE AND FATIGUE: ICD-10-CM

## 2022-08-04 DIAGNOSIS — R06.83 SNORING: ICD-10-CM

## 2022-08-04 DIAGNOSIS — Z72.820 LACK OF ADEQUATE SLEEP: ICD-10-CM

## 2022-08-04 DIAGNOSIS — R06.00 DYSPNEA AND RESPIRATORY ABNORMALITY: ICD-10-CM

## 2022-08-04 DIAGNOSIS — R53.81 MALAISE AND FATIGUE: ICD-10-CM

## 2022-08-04 DIAGNOSIS — R06.89 DYSPNEA AND RESPIRATORY ABNORMALITY: ICD-10-CM

## 2022-08-04 DIAGNOSIS — I48.0 PAF (PAROXYSMAL ATRIAL FIBRILLATION) (H): ICD-10-CM

## 2022-08-04 PROCEDURE — G0399 HOME SLEEP TEST/TYPE 3 PORTA: HCPCS | Performed by: INTERNAL MEDICINE

## 2022-08-05 NOTE — PROGRESS NOTES
HST POST-STUDY QUESTIONNAIRE    1. What time did you go to bed?  6514  2. How long do you think it took to fall asleep?  15 minutes  3. What time did you wake up to start the day?  0910  4. Did you get up during the night at all?  Yes   5. If you woke up, do you remember approximately what time(s)? 0600 bathroom and feed pets  6. Did you have any difficulty with the equipment?  No  7. Did you us any type of treatment with this study?  None  8. Was the head of the bed elevated? No  9. Did you sleep in a recliner?  No  10. Did you stop using CPAP at least 3 days before this test?  NA  11. Any other information you'd like us to know? no     This HSAT was performed using a Noxturnal T3 device which recorded snore, sound, movement activity, body position, nasal pressure, oronasal thermal airflow, pulse, oximetry and both chest and abdominal respiratory effort. HSAT data was restricted to the time patient states they were in bed.     HSAT was scored using 1B 4% hypopnea rule.     HST AHI (Non-PAT): 7.2  Snoring was reported as mild.  Time with SpO2 below 89% was 0.8 minutes.   Overall signal quality was good     Pt will follow up with sleep provider to determine appropriate therapy.

## 2022-08-17 PROBLEM — E66.811 CLASS 1 OBESITY DUE TO EXCESS CALORIES WITHOUT SERIOUS COMORBIDITY WITH BODY MASS INDEX (BMI) OF 30.0 TO 30.9 IN ADULT: Chronic | Status: ACTIVE | Noted: 2022-08-17

## 2022-08-17 PROBLEM — E66.09 CLASS 1 OBESITY DUE TO EXCESS CALORIES WITHOUT SERIOUS COMORBIDITY WITH BODY MASS INDEX (BMI) OF 30.0 TO 30.9 IN ADULT: Chronic | Status: ACTIVE | Noted: 2022-08-17

## 2022-08-17 NOTE — PROCEDURES
"HOME SLEEP STUDY INTERPRETATION        Patient: Gagandeep Oswald  MRN: [id number]  YOB: 1975  Study Date: 8/4/2022  PCP/Referring Provider: Claudia Solo;  Ordering Provider: Landon Herron PA-C         Indications for Home Study: Gagandeep Oswald is a 47 year old male s with symptoms suggestive of obstructive sleep apnea.    Estimated body mass index is 28.25 kg/m  as calculated from the following:    Height as of 6/3/22: 1.88 m (6' 2\").    Weight as of 6/3/22: 99.8 kg (220 lb).  Total score - [place]: 9 (6/1/2022  3:16 PM)  Total Score: 2 (6/1/2022  3:20 PM)        Data: A full night home sleep study was performed recording the standard physiologic parameters including body position, movement, sound, nasal pressure, thermal oral airflow, chest and abdominal movements with respiratory inductance plethysmography, and oxygen saturation by pulse oximetry. Pulse rate was estimated by oximetry recording. This study was considered adequate based on > 4 hours of quality oximetry and respiratory recording. As specified by the AASM Manual for the Scoring of Sleep and Associated events, version 2.3, Rule VIII.D 1B, 4% oxygen desaturation scoring for hypopneas is used as a standard of care on all home sleep apnea testing.        Analysis Time:  540 minutes        Respiration:   Sleep Associated Hypoxemia: sustained hypoxemia was not present. Time with saturation less than or equal to 88% was 0.2 minutes. The lowest oxygen saturation was 87%.   Snoring: Snoring was present.  Respiratory events: The home study revealed a presence of 7 obstructive apneas and 7 mixed and central apneas. There were 50 hypopneas resulting in a combined apnea/hypopnea index [AHI] of 7.2 events per hour.  AHI was 12 per hour supine, 1 per hour prone, 1 per hour on left side, and 2 per hour on right side.   Pattern: Excluding events noted above, respiratory rate and pattern was Normal.      Position: Percent of time spent: supine - " 49%, prone - 0%, on left - 21%, on right - 28%.      Heart Rate: By pulse oximetry normal rate was noted.       Assessment:     Mild obstructive sleep apnea, almost exclusively supine-positional    Sleep associated hypoxemia was not present.    Recommendations:    Consider auto-CPAP at 5-15 cmH2O, oral appliance therapy, positional therapy or surgical options.    Suggest optimizing sleep hygiene and avoiding sleep deprivation.    Weight management.        Diagnosis Code(s): Obstructive Sleep Apnea G47.33    Fortunato Rand MD, August 17, 2022   Diplomate, American Board of Internal Medicine, Sleep Medicine

## 2022-08-29 ASSESSMENT — SLEEP AND FATIGUE QUESTIONNAIRES
HOW LIKELY ARE YOU TO NOD OFF OR FALL ASLEEP WHILE SITTING AND TALKING TO SOMEONE: WOULD NEVER DOZE
HOW LIKELY ARE YOU TO NOD OFF OR FALL ASLEEP WHILE SITTING QUIETLY AFTER LUNCH WITHOUT ALCOHOL: SLIGHT CHANCE OF DOZING
HOW LIKELY ARE YOU TO NOD OFF OR FALL ASLEEP IN A CAR, WHILE STOPPED FOR A FEW MINUTES IN TRAFFIC: WOULD NEVER DOZE
HOW LIKELY ARE YOU TO NOD OFF OR FALL ASLEEP WHILE SITTING AND READING: SLIGHT CHANCE OF DOZING
HOW LIKELY ARE YOU TO NOD OFF OR FALL ASLEEP WHILE SITTING INACTIVE IN A PUBLIC PLACE: WOULD NEVER DOZE
HOW LIKELY ARE YOU TO NOD OFF OR FALL ASLEEP WHILE LYING DOWN TO REST IN THE AFTERNOON WHEN CIRCUMSTANCES PERMIT: HIGH CHANCE OF DOZING
HOW LIKELY ARE YOU TO NOD OFF OR FALL ASLEEP WHILE WATCHING TV: WOULD NEVER DOZE
HOW LIKELY ARE YOU TO NOD OFF OR FALL ASLEEP WHEN YOU ARE A PASSENGER IN A CAR FOR AN HOUR WITHOUT A BREAK: HIGH CHANCE OF DOZING

## 2022-08-31 ENCOUNTER — VIRTUAL VISIT (OUTPATIENT)
Dept: SLEEP MEDICINE | Facility: CLINIC | Age: 47
End: 2022-08-31
Payer: COMMERCIAL

## 2022-08-31 VITALS — WEIGHT: 225 LBS | HEIGHT: 73 IN | BODY MASS INDEX: 29.82 KG/M2

## 2022-08-31 DIAGNOSIS — G47.33 OSA (OBSTRUCTIVE SLEEP APNEA): Primary | ICD-10-CM

## 2022-08-31 PROCEDURE — 99213 OFFICE O/P EST LOW 20 MIN: CPT | Mod: 95 | Performed by: PHYSICIAN ASSISTANT

## 2022-08-31 NOTE — NURSING NOTE
Return if symptoms worsen or fail to improve. Mailed HST report and dental referral.    Remington Rai, Visit facilitator

## 2022-08-31 NOTE — PATIENT INSTRUCTIONS
Your Body mass index is 29.69 kg/m .  Weight management is a personal decision.  If you are interested in exploring weight loss strategies, the following discussion covers the approaches that may be successful. Body mass index (BMI) is one way to tell whether you are at a healthy weight, overweight, or obese. It measures your weight in relation to your height.  A BMI of 18.5 to 24.9 is in the healthy range. A person with a BMI of 25 to 29.9 is considered overweight, and someone with a BMI of 30 or greater is considered obese. More than two-thirds of American adults are considered overweight or obese.  Being overweight or obese increases the risk for further weight gain. Excess weight may lead to heart disease and diabetes.  Creating and following plans for healthy eating and physical activity may help you improve your health.  Weight control is part of healthy lifestyle and includes exercise, emotional health, and healthy eating habits. Careful eating habits lifelong are the mainstay of weight control. Though there are significant health benefits from weight loss, long-term weight loss with diet alone may be very difficult to achieve- studies show long-term success with dietary management in less than 10% of people. Attaining a healthy weight may be especially difficult to achieve in those with severe obesity. In some cases, medications, devices and surgical management might be considered.  What can you do?  If you are overweight or obese and are interested in methods for weight loss, you should discuss this with your provider.     Consider reducing daily calorie intake by 500 calories.     Keep a food journal.     Avoiding skipping meals, consider cutting portions instead.    Diet combined with exercise helps maintain muscle while optimizing fat loss. Strength training is particularly important for building and maintaining muscle mass. Exercise helps reduce stress, increase energy, and improves fitness.  Increasing exercise without diet control, however, may not burn enough calories to loose weight.       Start walking three days a week 10-20 minutes at a time    Work towards walking thirty minutes five days a week     Eventually, increase the speed of your walking for 1-2 minutes at time    In addition, we recommend that you review healthy lifestyles and methods for weight loss available through the National Institutes of Health patient information sites:  http://win.niddk.nih.gov/publications/index.htm    And look into health and wellness programs that may be available through your health insurance provider, employer, local community center, or [employer].

## 2022-08-31 NOTE — PROGRESS NOTES
"Gagandeep is a 47 year old who is being evaluated via a billable video visit.      How would you like to obtain your AVS? MyChart  If the video visit is dropped, the invitation should be resent by: Text to cell phone: [phone]  Will anyone else be joining your video visit? No     Cleona    Home Sleep Apnea Testing Results Visit:    Chief Complaint   Patient presents with     Results       Gagandeep Oswald is a 47 year old male who returns to [facility] after having had Home Sleep Apnea Testing.  He presented with loud snoring, daytime fatigue/sleepiness (ESS 9), difficulty maintaining sleep, large neck circumference, co-morbid paroxysmal atrial fibrillation and strong family history of ALAYNA.    Home Sleep Apnea Testing - 8/4/22: 225 lbs 0 oz: AHI 7.2/hr. Supine AHI 12/hr.   Oxygen Josh of 87%.  Baseline -%.  Sp02 =< 88% for 0.2 minutes  He slept on his back (49%), prone (0%), left (21%) and right (28%) sides.   Analysis time: 540 minutes.     Signal quality of Oxymeter 100% Good  Nasal Cannula 100% Good  RIP belts 100% Good.     Gagandeep Oswald reports that he slept Fair.     Home Sleep Apnea Testing was reviewed in detail today with Gagandeep and a copy given to him for his records.    Past medical/surgical history, family history, social history, medications and allergies were reviewed.      Ht 1.854 m (6' 1\")   Wt 102.1 kg (225 lb)   BMI 29.69 kg/m      Impression/Plan:  Mild Obstructive Sleep Apnea.   Sleep associated hypoxemia was not present.     Treatment options discussed today including  auto-CPAP at 6-15 cmH2O, oral appliance therapy, positional therapy, polysomnography with full night PAP titration or surgical options.    Elected treatment with oral appliance therapy.    Follow up as needed.    22 minutes spent on day of encounter doing chart review,  history and exam, counseling, coordinating plan of care, documentation and further activities as noted above. "      Landon Herron PA-C    Video-Visit Details    Video Start Time: 10:29 AM    Type of service:  Video Visit    Video End Time:10:41 AM    Originating Location (pt. Location): Home    Distant Location (provider location):  [facility]     Platform used for Video Visit: [employer] [facility]

## 2022-09-28 ENCOUNTER — TRANSFERRED RECORDS (OUTPATIENT)
Dept: HEALTH INFORMATION MANAGEMENT | Facility: CLINIC | Age: 47
End: 2022-09-28

## 2022-12-14 DIAGNOSIS — F41.9 ANXIETY: ICD-10-CM

## 2022-12-14 RX ORDER — HYDROXYZINE HYDROCHLORIDE 25 MG/1
TABLET, FILM COATED ORAL
Qty: 90 TABLET | Refills: 0 | Status: SHIPPED | OUTPATIENT
Start: 2022-12-14 | End: 2023-03-20

## 2023-01-08 ENCOUNTER — HEALTH MAINTENANCE LETTER (OUTPATIENT)
Age: 48
End: 2023-01-08

## 2023-03-18 DIAGNOSIS — F41.9 ANXIETY: ICD-10-CM

## 2023-03-20 RX ORDER — HYDROXYZINE HYDROCHLORIDE 25 MG/1
TABLET, FILM COATED ORAL
Qty: 90 TABLET | Refills: 0 | Status: SHIPPED | OUTPATIENT
Start: 2023-03-20 | End: 2023-06-20

## 2023-03-29 ENCOUNTER — MYC MEDICAL ADVICE (OUTPATIENT)
Dept: SLEEP MEDICINE | Facility: CLINIC | Age: 48
End: 2023-03-29
Payer: COMMERCIAL

## 2023-03-29 DIAGNOSIS — G47.33 OBSTRUCTIVE SLEEP APNEA: Primary | ICD-10-CM

## 2023-03-30 NOTE — TELEPHONE ENCOUNTER
History mild sleep apnea and paroxysmal atrial fibrillation.     Order for HST placed. Please facilitate testing and follow up.

## 2023-04-04 ENCOUNTER — TELEPHONE (OUTPATIENT)
Dept: DERMATOLOGY | Facility: CLINIC | Age: 48
End: 2023-04-04
Payer: COMMERCIAL

## 2023-04-04 NOTE — TELEPHONE ENCOUNTER
[facility] Call Center    Phone Message    May a detailed message be left on voicemail: yes     Reason for Call: Symptoms or Concerns     If patient has red-flag symptoms, warm transfer to triage line    Current symptom or concern: weird mole that is scaly on back not clear or round - pt can not see this (it was described by pt's referring provider)    Symptoms have been present for:  Unknown guessing a few day(s)    Has patient previously been seen for this? Yes    By : referred by: Claudia Solo MD in [facility]    Date: 04/03/23    Are there any new or worsening symptoms? No - Unknown  Pt is scheduled for the first available Appt 08/30/23 and wait listed. I did ask pt to send a picture on the MyChart and send to Candi to determine if needed to be seen sooner or not. Please review. Thanks       Action Taken: Message routed to:  Other: [facility]    Travel Screening: Not Applicable

## 2023-04-04 NOTE — TELEPHONE ENCOUNTER
Attempted to call pt and received no vm.  Will attempt at a later time.    Wendie NUNEZ RN  [facility] [place]  [phone]

## 2023-04-06 NOTE — TELEPHONE ENCOUNTER
Left a voicemail asking patient to give us a call back at our direct line [phone].   Also gave them the main derm number [phone] in case they cannot reach us at the direct line.       Sarah ISABEL RN  [facility]  [phone]

## 2023-04-13 NOTE — TELEPHONE ENCOUNTER
Left VM for patient to return call to direct line at [phone] or main number at [phone] to discuss scheduling earlier derm appt.    GLibia MARINO

## 2023-04-17 NOTE — TELEPHONE ENCOUNTER
Pt not responding back to messages or my chart message about appt.    Wendie NUNEZ RN  [facility] [place]  [phone]

## 2023-04-19 ASSESSMENT — SLEEP AND FATIGUE QUESTIONNAIRES
HOW LIKELY ARE YOU TO NOD OFF OR FALL ASLEEP WHILE SITTING AND TALKING TO SOMEONE: WOULD NEVER DOZE
HOW LIKELY ARE YOU TO NOD OFF OR FALL ASLEEP IN A CAR, WHILE STOPPED FOR A FEW MINUTES IN TRAFFIC: WOULD NEVER DOZE
HOW LIKELY ARE YOU TO NOD OFF OR FALL ASLEEP WHEN YOU ARE A PASSENGER IN A CAR FOR AN HOUR WITHOUT A BREAK: HIGH CHANCE OF DOZING
HOW LIKELY ARE YOU TO NOD OFF OR FALL ASLEEP WHILE SITTING QUIETLY AFTER LUNCH WITHOUT ALCOHOL: SLIGHT CHANCE OF DOZING
HOW LIKELY ARE YOU TO NOD OFF OR FALL ASLEEP WHILE LYING DOWN TO REST IN THE AFTERNOON WHEN CIRCUMSTANCES PERMIT: HIGH CHANCE OF DOZING
HOW LIKELY ARE YOU TO NOD OFF OR FALL ASLEEP WHILE WATCHING TV: WOULD NEVER DOZE
HOW LIKELY ARE YOU TO NOD OFF OR FALL ASLEEP WHILE SITTING INACTIVE IN A PUBLIC PLACE: WOULD NEVER DOZE
HOW LIKELY ARE YOU TO NOD OFF OR FALL ASLEEP WHILE SITTING AND READING: MODERATE CHANCE OF DOZING

## 2023-04-26 ENCOUNTER — OFFICE VISIT (OUTPATIENT)
Dept: SLEEP MEDICINE | Facility: CLINIC | Age: 48
End: 2023-04-26
Attending: PHYSICIAN ASSISTANT
Payer: COMMERCIAL

## 2023-04-26 DIAGNOSIS — G47.33 OBSTRUCTIVE SLEEP APNEA: Primary | ICD-10-CM

## 2023-04-26 DIAGNOSIS — E66.09 CLASS 1 OBESITY DUE TO EXCESS CALORIES WITHOUT SERIOUS COMORBIDITY WITH BODY MASS INDEX (BMI) OF 30.0 TO 30.9 IN ADULT: Chronic | ICD-10-CM

## 2023-04-26 DIAGNOSIS — E66.811 CLASS 1 OBESITY DUE TO EXCESS CALORIES WITHOUT SERIOUS COMORBIDITY WITH BODY MASS INDEX (BMI) OF 30.0 TO 30.9 IN ADULT: Chronic | ICD-10-CM

## 2023-04-26 PROCEDURE — G0399 HOME SLEEP TEST/TYPE 3 PORTA: HCPCS | Performed by: INTERNAL MEDICINE

## 2023-04-26 NOTE — PROGRESS NOTES
Pt is completing a home sleep test. Pt was instructed on how to put on the Noxturnal T3 device and associated equipment before going to bed and given the opportunity to practice putting it on before leaving the sleep center. Pt was reminded to bring the home sleep test kit back to the center tomorrow, at agreed upon time for download and reporting.   Neck circumference: 42 CM / 16.5 inches.

## 2023-04-27 ENCOUNTER — DOCUMENTATION ONLY (OUTPATIENT)
Dept: SLEEP MEDICINE | Facility: CLINIC | Age: 48
End: 2023-04-27
Payer: COMMERCIAL

## 2023-04-27 ASSESSMENT — ENCOUNTER SYMPTOMS
CONSTIPATION: 0
ARTHRALGIAS: 0
DIARRHEA: 0
JOINT SWELLING: 0
HEARTBURN: 0
COUGH: 0
PARESTHESIAS: 1
ABDOMINAL PAIN: 0
FREQUENCY: 0
HEADACHES: 0
DYSURIA: 0
SHORTNESS OF BREATH: 0
FEVER: 0
DIZZINESS: 0
HEMATOCHEZIA: 0
NAUSEA: 0
CHILLS: 0
SORE THROAT: 0
EYE PAIN: 0
NERVOUS/ANXIOUS: 1
MYALGIAS: 0
PALPITATIONS: 0
HEMATURIA: 0
WEAKNESS: 0

## 2023-04-27 NOTE — PROGRESS NOTES
This HSAT was performed using a Noxturnal T3 device which recorded snore, sound, movement activity, body position, nasal pressure, oronasal thermal airflow, pulse, oximetry and both chest and abdominal respiratory effort. HSAT data was restricted to the time patient states they were in bed.     HSAT was scored using 1B 4% hypopnea rule.     HST AHI (Non-PAT): 10.2  Snoring was reported as none.  Time with SpO2 below 89% was 0.5 minutes.   Overall signal quality was good     Pt will follow up with sleep provider to determine appropriate therapy.         HST POST-STUDY QUESTIONNAIRE    1. What time did you go to bed?  2245  2. How long do you think it took to fall asleep?  15 min  3. What time did you wake up to start the day?  0830  4. Did you get up during the night at all?  yes  5. If you woke up, do you remember approximately what time(s)? 0615  6. Did you have any difficulty with the equipment?  No  7. Did you us any type of treatment with this study?  Oral Appliance  8. Was the head of the bed elevated? No  9. Did you sleep in a recliner?  No  10. Did you stop using CPAP at least 3 days before this test?  NA  11. Any other information you'd like us to know? -

## 2023-05-01 ENCOUNTER — OFFICE VISIT (OUTPATIENT)
Dept: DERMATOLOGY | Facility: CLINIC | Age: 48
End: 2023-05-01
Payer: COMMERCIAL

## 2023-05-01 DIAGNOSIS — L81.4 LENTIGO: Primary | ICD-10-CM

## 2023-05-01 DIAGNOSIS — D23.9 DERMAL NEVUS: ICD-10-CM

## 2023-05-01 DIAGNOSIS — L82.1 SEBORRHEIC KERATOSIS: ICD-10-CM

## 2023-05-01 DIAGNOSIS — D18.01 ANGIOMA OF SKIN: ICD-10-CM

## 2023-05-01 PROCEDURE — 99203 OFFICE O/P NEW LOW 30 MIN: CPT | Performed by: DERMATOLOGY

## 2023-05-01 NOTE — PATIENT INSTRUCTIONS
Proper skin care from [facility]:    -Eliminate harsh soaps as they strip the natural oils from the skin, often resulting in dry itchy skin ( i.e. Dial, Zest, Maori Spring)  -Use mild soaps such as Cetaphil or Dove Sensitive Skin in the shower. You do not need to use soap on arms, legs, and trunk every time you shower unless visibly soiled.   -Avoid hot or cold showers.  -After showering, lightly dry off and apply moisturizing within 2-3 minutes. This will help trap moisture in the skin.   -Aggressive use of a moisturizer at least 1-2 times a day to the entire body (including -Vanicream, Cetaphil, Aquaphor or Cerave) and moisturize hands after every washing.  -We recommend using moisturizers that come in a tub that needs to be scooped out, not a pump. This has more of an oil base. It will hold moisture in your skin much better than a water base moisturizer. The above recommended are non-pore clogging.      Wear a sunscreen with at least SPF 30 on your face, ears, neck and V of the chest daily. Wear sunscreen on other areas of the body if those areas are exposed to the sun throughout the day. Sunscreens can contain physical and/or chemical blockers. Physical blockers are less likely to clog pores, these include zinc oxide and titanium dioxide. Reapply every two hour and after swimming.     Sunscreen examples: https://www.ewg.org/sunscreen/    UV radiation  UVA radiation remains constant throughout the day and throughout the year. It is a longer wavelength than UVB and therefore penetrates deeper into the skin leading to immediate and delayed tanning, photoaging, and skin cancer. 70-80% of UVA and UVB radiation occurs between the hours of 10am-2pm.  UVB radiation  UVB radiation causes the most harmful effects and is more significant during the summer months. However, snow and ice can reflect UVB radiation leading to skin damage during the winter months as well. UVB radiation is responsible for tanning,  burning, inflammation, delayed erythema (pinkness), pigmentation (brown spots), and skin cancer.     I recommend self monthly full body exams and yearly full body exams with a dermatology provider. If you develop a new or changing lesion please follow up for examination. Most skin cancers are pink and scaly or pink and pearly. However, we do see blue/brown/black skin cancers.  Consider the ABCDEs of melanoma when giving yourself your monthly full body exam ( don't forget the groin, buttocks, feet, toes, etc). A-asymmetry, B-borders, C-color, D-diameter, E-elevation or evolving. If you see any of these changes please follow up in clinic. If you cannot see your back I recommend purchasing a hand held mirror to use with a larger wall mirror.

## 2023-05-01 NOTE — LETTER
5/1/2023         RE: Gagandeep Oswald  [address] [place] [address]        Dear Colleague,    Thank you for referring your patient, Gagandeep Oswald, to the [facility]. Please see a copy of my visit note below.    Gagandeep Oswald is an extremely pleasant 48 year old year old male patient here today for spot on back.   .   Patient states this has been present for a whil.  Patient reports the following symptoms:  rough.  Patient reports the following previous treatments none.  These treatments did not work.  Patient reports the following modifying factors none.  Associated symptoms: none.  Patient has no other skin complaints today.  Remainder of the HPI, Meds, PMH, Allergies, FH, and SH was reviewed in chart.      Past Medical History:   Diagnosis Date     Facial nerve palsy     LT congenital      Hyperlipidemia LDL goal < 160 5/7/2012     Kidney stone 9/20/2021     Nephrolithiasis     has had it 3 times-last one was in 2016     New onset atrial fibrillation (H) 5/17/2021     Varicocele     LT       Past Surgical History:   Procedure Laterality Date     COLONOSCOPY N/A 6/13/2022    Procedure: COLONOSCOPY, FLEXIBLE, WITH LESION REMOVAL USING SNARE;  Surgeon: Mukund Hayes MD;  Location: [facility] OR     COLONOSCOPY WITH CO2 INSUFFLATION N/A 6/13/2022    Procedure: COLONOSCOPY, WITH CO2 INSUFFLATION;  Surgeon: Mukund Hayes MD;  Location: [facility]     ENDOSCOPIC SINUS SURGERY  1/24/2013    Procedure: ENDOSCOPIC SINUS SURGERY;  Bilateral total ethmoidectomy, bilateral endoscopic maxillary antrostomy;  Surgeon: Han Toledo MD;  Location: [facility] OR     ENT SURGERY  1/24/2013    Surgery to correct sinus infection     MOUTH SURGERY  6/2012    [place] Teeth Removed     VASECTOMY          Family History   Problem Relation Age of Onset     Allergies Mother      Arthritis Mother      Breast Cancer Mother 71     Diabetes Mother      Obesity Mother       Pancreatic Cancer Mother         d age 80     Other Cancer Mother         Pancreatic     Obesity Brother      Diabetes Brother      C.A.D. Father 82        MI     Cancer Father         skin     Prostate Cancer Father      Kidney Cancer Father         d age 85     Hypertension Father      Other Cancer Father         Removed age 85     Obesity Sister      Obesity Sister      Colon Cancer No family hx of        Social History     Socioeconomic History     Marital status:      Spouse name: Not on file     Number of children: 1     Years of education: 16     Highest education level: Not on file   Occupational History     Occupation: IT      Employer: [employer]   Tobacco Use     Smoking status: Former     Packs/day: 0.50     Years: 3.00     Pack years: 1.50     Types: Cigarettes     Quit date: 2005     Years since quittin.3     Smokeless tobacco: Never   Vaping Use     Vaping status: Never Used   Substance and Sexual Activity     Alcohol use: Yes     Alcohol/week: 8.3 standard drinks of alcohol     Comment: 1-2 drinks a week     Drug use: No     Sexual activity: Yes     Partners: Female     Birth control/protection: Condom, Male Surgical   Other Topics Concern     Parent/sibling w/ CABG, MI or angioplasty before 65F 55M? No   Social History Narrative     Not on file     Social Determinants of Health     Financial Resource Strain: Not on file   Food Insecurity: Not on file   Transportation Needs: Not on file   Physical Activity: Not on file   Stress: Not on file   Social Connections: Not on file   Intimate Partner Violence: Not on file   Housing Stability: Not on file       Outpatient Encounter Medications as of 2023   Medication Sig Dispense Refill     co-enzyme Q-10 100 MG CAPS capsule        fish oil-omega-3 fatty acids 1000 MG capsule        hydrOXYzine (ATARAX) 25 MG tablet TAKE 1/2-1 TABLET BY MOUTH ONCE DAILY AT BEDTIME 90 tablet 0     Multiple Vitamin (MULTIVITAMINS PO) Take  by mouth.       No  facility-administered encounter medications on file as of 5/1/2023.             O:   NAD, WDWN, Alert & Oriented, Mood & Affect wnl, Vitals stable   Here today alone   General appearance normal   Vitals stable   Alert, oriented and in no acute distress     R back stuck on papule    Stuck on papules and brown macules on trunk and ext   Red papules on trunk  Flesh colored papules on trunk         Eyes: Conjunctivae/lids:Normal     ENT: Lips, buccal mucosa, tongue: normal    MSK:Normal    Cardiovascular: peripheral edema none    Pulm: Breathing Normal    Neuro/Psych: Orientation:Alert and Orientedx3 ; Mood/Affect:normal       A/P:  1. Seborrheic keratosis, lentigo, angioma, dermal nevus  It was a pleasure speaking to Gagandeep Oswald today.  Previous clinic notes and pertinent laboratory tests were reviewed prior to Gagandeep Oswald's visit.  Nature of benign skin lesions dicussed with patient.  Signs and Symptoms of skin cancer discussed with patient.  Patient encouraged to perform monthly skin exams.  UV precautions reviewed with patient.  Return to clinic 12 months        Again, thank you for allowing me to participate in the care of your patient.        Sincerely,        Chucho Fregoso MD

## 2023-05-01 NOTE — PROGRESS NOTES
Gagandeep Oswald is an extremely pleasant 48 year old year old male patient here today for spot on back.   .   Patient states this has been present for a whil.  Patient reports the following symptoms:  rough.  Patient reports the following previous treatments none.  These treatments did not work.  Patient reports the following modifying factors none.  Associated symptoms: none.  Patient has no other skin complaints today.  Remainder of the HPI, Meds, PMH, Allergies, FH, and SH was reviewed in chart.      Past Medical History:   Diagnosis Date     Facial nerve palsy     LT congenital      Hyperlipidemia LDL goal < 160 5/7/2012     Kidney stone 9/20/2021     Nephrolithiasis     has had it 3 times-last one was in 2016     New onset atrial fibrillation (H) 5/17/2021     Varicocele     LT       Past Surgical History:   Procedure Laterality Date     COLONOSCOPY N/A 6/13/2022    Procedure: COLONOSCOPY, FLEXIBLE, WITH LESION REMOVAL USING SNARE;  Surgeon: Mukund Hayes MD;  Location: [facility]     COLONOSCOPY WITH CO2 INSUFFLATION N/A 6/13/2022    Procedure: COLONOSCOPY, WITH CO2 INSUFFLATION;  Surgeon: Mukund Hayes MD;  Location: [facility]     ENDOSCOPIC SINUS SURGERY  1/24/2013    Procedure: ENDOSCOPIC SINUS SURGERY;  Bilateral total ethmoidectomy, bilateral endoscopic maxillary antrostomy;  Surgeon: Han Toledo MD;  Location: [facility]     ENT SURGERY  1/24/2013    Surgery to correct sinus infection     MOUTH SURGERY  6/2012    [place] Teeth Removed     VASECTOMY          Family History   Problem Relation Age of Onset     Allergies Mother      Arthritis Mother      Breast Cancer Mother 71     Diabetes Mother      Obesity Mother      Pancreatic Cancer Mother         d age 80     Other Cancer Mother         Pancreatic     Obesity Brother      Diabetes Brother      C.A.D. Father 82        MI     Cancer Father         skin     Prostate Cancer Father      Kidney Cancer Father         d age 85      Hypertension Father      Other Cancer Father         Removed age 85     Obesity Sister      Obesity Sister      Colon Cancer No family hx of        Social History     Socioeconomic History     Marital status:      Spouse name: Not on file     Number of children: 1     Years of education: 16     Highest education level: Not on file   Occupational History     Occupation: IT      Employer: [employer]   Tobacco Use     Smoking status: Former     Packs/day: 0.50     Years: 3.00     Pack years: 1.50     Types: Cigarettes     Quit date: 2005     Years since quittin.3     Smokeless tobacco: Never   Vaping Use     Vaping status: Never Used   Substance and Sexual Activity     Alcohol use: Yes     Alcohol/week: 8.3 standard drinks of alcohol     Comment: 1-2 drinks a week     Drug use: No     Sexual activity: Yes     Partners: Female     Birth control/protection: Condom, Male Surgical   Other Topics Concern     Parent/sibling w/ CABG, MI or angioplasty before 65F 55M? No   Social History Narrative     Not on file     Social Determinants of Health     Financial Resource Strain: Not on file   Food Insecurity: Not on file   Transportation Needs: Not on file   Physical Activity: Not on file   Stress: Not on file   Social Connections: Not on file   Intimate Partner Violence: Not on file   Housing Stability: Not on file       Outpatient Encounter Medications as of 2023   Medication Sig Dispense Refill     co-enzyme Q-10 100 MG CAPS capsule        fish oil-omega-3 fatty acids 1000 MG capsule        hydrOXYzine (ATARAX) 25 MG tablet TAKE 1/2-1 TABLET BY MOUTH ONCE DAILY AT BEDTIME 90 tablet 0     Multiple Vitamin (MULTIVITAMINS PO) Take  by mouth.       No facility-administered encounter medications on file as of 2023.             O:   NAD, WDWN, Alert & Oriented, Mood & Affect wnl, Vitals stable   Here today alone   General appearance normal   Vitals stable   Alert, oriented and in no acute distress     R back  stuck on papule    Stuck on papules and brown macules on trunk and ext   Red papules on trunk  Flesh colored papules on trunk         Eyes: Conjunctivae/lids:Normal     ENT: Lips, buccal mucosa, tongue: normal    MSK:Normal    Cardiovascular: peripheral edema none    Pulm: Breathing Normal    Neuro/Psych: Orientation:Alert and Orientedx3 ; Mood/Affect:normal       A/P:  1. Seborrheic keratosis, lentigo, angioma, dermal nevus  It was a pleasure speaking to Gagandeep Oswald today.  Previous clinic notes and pertinent laboratory tests were reviewed prior to Gagandeep Oswald's visit.  Nature of benign skin lesions dicussed with patient.  Signs and Symptoms of skin cancer discussed with patient.  Patient encouraged to perform monthly skin exams.  UV precautions reviewed with patient.  Return to clinic 12 months

## 2023-05-04 ENCOUNTER — OFFICE VISIT (OUTPATIENT)
Dept: FAMILY MEDICINE | Facility: CLINIC | Age: 48
End: 2023-05-04
Payer: COMMERCIAL

## 2023-05-04 ENCOUNTER — PATIENT OUTREACH (OUTPATIENT)
Dept: ONCOLOGY | Facility: CLINIC | Age: 48
End: 2023-05-04

## 2023-05-04 VITALS
SYSTOLIC BLOOD PRESSURE: 126 MMHG | BODY MASS INDEX: 31.51 KG/M2 | WEIGHT: 237.8 LBS | DIASTOLIC BLOOD PRESSURE: 79 MMHG | HEIGHT: 73 IN | HEART RATE: 77 BPM | OXYGEN SATURATION: 98 % | TEMPERATURE: 97.7 F

## 2023-05-04 DIAGNOSIS — Z80.0 FAMILY HISTORY OF MALIGNANT NEOPLASM OF GASTROINTESTINAL TRACT: ICD-10-CM

## 2023-05-04 DIAGNOSIS — F41.9 ANXIETY: ICD-10-CM

## 2023-05-04 DIAGNOSIS — Z00.00 ROUTINE HISTORY AND PHYSICAL EXAMINATION OF ADULT: ICD-10-CM

## 2023-05-04 DIAGNOSIS — E78.5 HYPERLIPIDEMIA WITH TARGET LDL LESS THAN 160: ICD-10-CM

## 2023-05-04 DIAGNOSIS — Z80.3 FAMILY HISTORY OF MALIGNANT NEOPLASM OF BREAST: ICD-10-CM

## 2023-05-04 DIAGNOSIS — I48.0 PAF (PAROXYSMAL ATRIAL FIBRILLATION) (H): ICD-10-CM

## 2023-05-04 DIAGNOSIS — Z80.0 FAMILY HISTORY OF PANCREATIC CANCER: ICD-10-CM

## 2023-05-04 LAB
CHOLEST SERPL-MCNC: 226 MG/DL
FASTING STATUS PATIENT QL REPORTED: NORMAL
GLUCOSE SERPL-MCNC: 96 MG/DL (ref 70–99)
HDLC SERPL-MCNC: 51 MG/DL
LDLC SERPL CALC-MCNC: 156 MG/DL
NONHDLC SERPL-MCNC: 175 MG/DL
TRIGL SERPL-MCNC: 93 MG/DL

## 2023-05-04 PROCEDURE — 90746 HEPB VACCINE 3 DOSE ADULT IM: CPT | Performed by: FAMILY MEDICINE

## 2023-05-04 PROCEDURE — 90677 PCV20 VACCINE IM: CPT | Performed by: FAMILY MEDICINE

## 2023-05-04 PROCEDURE — 90471 IMMUNIZATION ADMIN: CPT | Performed by: FAMILY MEDICINE

## 2023-05-04 PROCEDURE — 90472 IMMUNIZATION ADMIN EACH ADD: CPT | Performed by: FAMILY MEDICINE

## 2023-05-04 PROCEDURE — 80061 LIPID PANEL: CPT | Performed by: FAMILY MEDICINE

## 2023-05-04 PROCEDURE — 99396 PREV VISIT EST AGE 40-64: CPT | Mod: 25 | Performed by: FAMILY MEDICINE

## 2023-05-04 PROCEDURE — 36415 COLL VENOUS BLD VENIPUNCTURE: CPT | Performed by: FAMILY MEDICINE

## 2023-05-04 PROCEDURE — 82947 ASSAY GLUCOSE BLOOD QUANT: CPT | Performed by: FAMILY MEDICINE

## 2023-05-04 RX ORDER — HYDROXYZINE HYDROCHLORIDE 25 MG/1
TABLET, FILM COATED ORAL
Qty: 90 TABLET | Refills: 0 | Status: CANCELLED | OUTPATIENT
Start: 2023-05-04

## 2023-05-04 ASSESSMENT — PAIN SCALES - GENERAL: PAINLEVEL: NO PAIN (0)

## 2023-05-04 ASSESSMENT — ENCOUNTER SYMPTOMS
CHILLS: 0
WEAKNESS: 0
CONSTIPATION: 0
JOINT SWELLING: 0
SHORTNESS OF BREATH: 0
EYE PAIN: 0
ABDOMINAL PAIN: 0
DYSURIA: 0
HEMATURIA: 0
SORE THROAT: 0
FREQUENCY: 0
DIARRHEA: 0
HEADACHES: 0
MYALGIAS: 0
HEARTBURN: 0
NERVOUS/ANXIOUS: 1
PALPITATIONS: 0
DIZZINESS: 0
NAUSEA: 0
ARTHRALGIAS: 0
PARESTHESIAS: 1
FEVER: 0
HEMATOCHEZIA: 0
COUGH: 0

## 2023-05-04 NOTE — PROGRESS NOTES
Writer received referral to Cancer Risk Management/Genetic Counseling.    Referred for: Family History of multiple cancers     Referral reviewed for appropriate plan, and sent to New Patient Scheduling for completion.    Heaven Benoit, RN, BSN  Oncology New Patient Nurse Navigator   [facility]  [phone]

## 2023-05-04 NOTE — PROGRESS NOTES
SUBJECTIVE:   CC: Gagandeep is an 48 year old who presents for preventative health visit.        View : No data to display.            Patient has been advised of split billing requirements and indicates understanding: Yes  Healthy Habits:     Getting at least 3 servings of Calcium per day:  Yes    Bi-annual eye exam:  Yes    Dental care twice a year:  Yes    Sleep apnea or symptoms of sleep apnea:  Sleep apnea    Diet:  Regular (no restrictions)    Frequency of exercise:  2-3 days/week    Duration of exercise:  15-30 minutes    Taking medications regularly:  No    Barriers to taking medications:  None    Medication side effects:  None    PHQ-2 Total Score: 0    Additional concerns today:  No    Hyperlipidemia Follow-Up      Are you regularly taking any medication or supplement to lower your cholesterol?   No    Are you having muscle aches or other side effects that you think could be caused by your cholesterol lowering medication?  No      Today's PHQ-2 Score:       2023     3:51 PM   PHQ-2 (  [employer])   Q1: Little interest or pleasure in doing things 0   Q2: Feeling down, depressed or hopeless 0   PHQ-2 Score 0   Q1: Little interest or pleasure in doing things Not at all   Q2: Feeling down, depressed or hopeless Not at all   PHQ-2 Score 0           Social History     Tobacco Use     Smoking status: Former     Packs/day: 0.50     Years: 3.00     Pack years: 1.50     Types: Cigarettes     Quit date: 2005     Years since quittin.3     Smokeless tobacco: Never   Vaping Use     Vaping status: Never Used   Substance Use Topics     Alcohol use: Yes     Alcohol/week: 8.3 standard drinks of alcohol     Comment: 6 drinks a week           2023     3:50 PM   Alcohol Use   Prescreen: >3 drinks/day or >7 drinks/week? No       Last PSA: No results found for: PSA    Reviewed orders with patient. Reviewed health maintenance and updated orders accordingly - Yes  Lab work is in process  Labs reviewed in  EPIC  BP Readings from Last 3 Encounters:   23 126/79   22 112/83   22 113/74    Wt Readings from Last 3 Encounters:   23 107.9 kg (237 lb 12.8 oz)   22 102.1 kg (225 lb)   22 99.8 kg (220 lb)                  Patient Active Problem List   Diagnosis     Hyperlipidemia with target LDL less than 160     History of sinus surgery     PAF (paroxysmal atrial fibrillation) (H)     Family history of colonic polyps     Anxiety     Class 1 obesity due to excess calories without serious comorbidity with body mass index (BMI) of 30.0 to 30.9 in adult     Past Surgical History:   Procedure Laterality Date     COLONOSCOPY N/A 2022    Procedure: COLONOSCOPY, FLEXIBLE, WITH LESION REMOVAL USING SNARE;  Surgeon: Mukund Hayes MD;  Location: [facility] OR     COLONOSCOPY WITH CO2 INSUFFLATION N/A 2022    Procedure: COLONOSCOPY, WITH CO2 INSUFFLATION;  Surgeon: Mukund Hayes MD;  Location: [facility] OR     ENDOSCOPIC SINUS SURGERY  2013    Procedure: ENDOSCOPIC SINUS SURGERY;  Bilateral total ethmoidectomy, bilateral endoscopic maxillary antrostomy;  Surgeon: Han Toledo MD;  Location: [facility] OR     ENT SURGERY  2013    Surgery to correct sinus infection     MOUTH SURGERY  2012    [place] Teeth Removed     VASECTOMY         Social History     Tobacco Use     Smoking status: Former     Packs/day: 0.50     Years: 3.00     Pack years: 1.50     Types: Cigarettes     Quit date: 2005     Years since quittin.3     Smokeless tobacco: Never   Vaping Use     Vaping status: Never Used   Substance Use Topics     Alcohol use: Yes     Alcohol/week: 8.3 standard drinks of alcohol     Comment: 6 drinks a week     Family History   Problem Relation Age of Onset     Allergies Mother      Arthritis Mother      Breast Cancer Mother      Diabetes Mother      Obesity Mother      Pancreatic Cancer Mother         d age 80     Other Cancer Mother         Pancreatic     Obesity Brother       Diabetes Brother      C.A.D. Father 82        MI     Cancer Father         skin     Prostate Cancer Father      Kidney Cancer Father         d age 85     Hypertension Father      Other Cancer Father         Removed age 85     Obesity Sister      Obesity Sister      Colon Cancer No family hx of          Current Outpatient Medications   Medication Sig Dispense Refill     co-enzyme Q-10 100 MG CAPS capsule        fish oil-omega-3 fatty acids 1000 MG capsule        hydrOXYzine (ATARAX) 25 MG tablet TAKE 1/2-1 TABLET BY MOUTH ONCE DAILY AT BEDTIME 90 tablet 0     Multiple Vitamin (MULTIVITAMINS PO) Take  by mouth.       Allergies   Allergen Reactions     Nickel Itching and Rash     Recent Labs   Lab Test 05/04/23  1223 03/31/22  1231 01/14/22  1706 09/28/21  1045 05/17/21  1307 04/07/21  1241 05/13/16  0910 01/18/16  1051   A1C  --   --  5.1  --   --   --   --   --    * 122*  --   --   --  177*   < >  --    HDL 51 50  --   --   --  63   < >  --    TRIG 93 127  --   --   --  94   < >  --    ALT  --   --  40  --   --   --   --  61   CR  --   --  1.18 1.03 1.07 1.05  --  1.05   GFRESTIMATED  --   --  77 86 82 84  --  78   GFRESTBLACK  --   --   --   --  >90 >90  --  >90  African American GFR Calc     POTASSIUM  --   --  4.2 4.3 4.4 4.3  --  4.3   TSH  --   --  0.55  --  0.87 0.81   < >  --     < > = values in this interval not displayed.        Reviewed and updated as needed this visit by clinical staff   Tobacco  Allergies  Meds  Problems  Med Hx  Surg Hx  Fam Hx          Reviewed and updated as needed this visit by Provider   Tobacco  Allergies  Meds  Problems  Med Hx  Surg Hx  Fam Hx         Past Medical History:   Diagnosis Date     Facial nerve palsy     LT congenital      Hyperlipidemia LDL goal < 160 5/7/2012     Kidney stone 9/20/2021     Nephrolithiasis     has had it 3 times-last one was in 2016     New onset atrial fibrillation (H) 5/17/2021     Varicocele     LT      Past Surgical  "History:   Procedure Laterality Date     COLONOSCOPY N/A 6/13/2022    Procedure: COLONOSCOPY, FLEXIBLE, WITH LESION REMOVAL USING SNARE;  Surgeon: Mukund Hayes MD;  Location: [facility]     COLONOSCOPY WITH CO2 INSUFFLATION N/A 6/13/2022    Procedure: COLONOSCOPY, WITH CO2 INSUFFLATION;  Surgeon: Mukund Hayes MD;  Location: [facility]     ENDOSCOPIC SINUS SURGERY  1/24/2013    Procedure: ENDOSCOPIC SINUS SURGERY;  Bilateral total ethmoidectomy, bilateral endoscopic maxillary antrostomy;  Surgeon: Han Toledo MD;  Location: [facility]     ENT SURGERY  1/24/2013    Surgery to correct sinus infection     MOUTH SURGERY  6/2012    [place] Teeth Removed     VASECTOMY         Review of Systems   Constitutional: Negative for chills and fever.   HENT: Negative for congestion, ear pain, hearing loss and sore throat.    Eyes: Negative for pain and visual disturbance.   Respiratory: Negative for cough and shortness of breath.    Cardiovascular: Negative for chest pain, palpitations and peripheral edema.   Gastrointestinal: Negative for abdominal pain, constipation, diarrhea, heartburn, hematochezia and nausea.   Genitourinary: Negative for dysuria, frequency, genital sores, hematuria, impotence, penile discharge and urgency.   Musculoskeletal: Negative for arthralgias, joint swelling and myalgias.   Skin: Negative for rash.   Neurological: Positive for paresthesias. Negative for dizziness, weakness and headaches.   Psychiatric/Behavioral: Negative for mood changes. The patient is nervous/anxious.      He has Not had any more Problems with atrial fib    OBJECTIVE:   /79   Pulse 77   Temp 97.7  F (36.5  C) (Temporal)   Ht 1.863 m (6' 1.35\")   Wt 107.9 kg (237 lb 12.8 oz)   SpO2 98%   BMI 31.08 kg/m      Physical Exam  GENERAL: healthy, alert and no distress  EYES: Eyes grossly normal to inspection, PERRL and conjunctivae and sclerae normal  HENT: ear canals and TM's normal, nose and mouth without " "ulcers or lesions  NECK: no adenopathy, no asymmetry, masses, or scars and thyroid normal to palpation  RESP: lungs clear to auscultation - no rales, rhonchi or wheezes  CV: regular rate and rhythm, normal S1 S2, no S3 or S4, no murmur, click or rub, no peripheral edema and peripheral pulses strong  ABDOMEN: soft, nontender, no hepatosplenomegaly, no masses and bowel sounds normal  MS: no gross musculoskeletal defects noted, no edema  SKIN: no suspicious lesions or rashes  NEURO: Normal strength and tone, mentation intact and speech normal  PSYCH: mentation appears normal, affect normal/bright    Diagnostic Test Results:  Labs reviewed in Epic    ASSESSMENT/PLAN:   (Z00.00) Routine history and physical examination of adult  Comment:   Plan: Glucose            (E78.5) Hyperlipidemia with target LDL less than 160  Comment: pending   Plan: Lipid panel reflex to direct LDL Non-fasting            (F41.9) Anxiety  Comment: stable   Plan:     (I48.0) PAF (paroxysmal atrial fibrillation) (H)  Comment: no further Problesm   Plan:     (Z80.0) Family history of malignant neoplasm of gastrointestinal tract  Comment: refer genetic counsling   Plan: Adult Oncology/Hematology  Referral            (Z80.3) Family history of malignant neoplasm of breast  Comment: as above  Plan: Adult Oncology/Hematology  Referral      (Z80.0) Family history of pancreatic cancer  Comment: as above  Plan: Adult Oncology/Hematology  Referral                COUNSELING:   Reviewed preventive health counseling, as reflected in patient instructions       Regular exercise       Healthy diet/nutrition       Colorectal cancer screening       Advance Care Planning      BMI:   Estimated body mass index is 31.08 kg/m  as calculated from the following:    Height as of this encounter: 1.863 m (6' 1.35\").    Weight as of this encounter: 107.9 kg (237 lb 12.8 oz).   Weight management plan: Discussed healthy diet and exercise " guidelines      He reports that he quit smoking about 18 years ago. His smoking use included cigarettes. He has a 1.50 pack-year smoking history. He has never used smokeless tobacco.        Claudia Solo MD  [facility]

## 2023-05-22 ENCOUNTER — VIRTUAL VISIT (OUTPATIENT)
Dept: ONCOLOGY | Facility: CLINIC | Age: 48
End: 2023-05-22
Attending: FAMILY MEDICINE
Payer: COMMERCIAL

## 2023-05-22 DIAGNOSIS — Z80.49 FAMILY HISTORY OF UTERINE CANCER: ICD-10-CM

## 2023-05-22 DIAGNOSIS — Z80.51 FAMILY HISTORY OF KIDNEY CANCER: ICD-10-CM

## 2023-05-22 DIAGNOSIS — Z83.719 FAMILY HISTORY OF COLONIC POLYPS: ICD-10-CM

## 2023-05-22 DIAGNOSIS — Z80.42 FAMILY HISTORY OF PROSTATE CANCER: ICD-10-CM

## 2023-05-22 DIAGNOSIS — Z80.0 FAMILY HISTORY OF PANCREATIC CANCER: ICD-10-CM

## 2023-05-22 DIAGNOSIS — Z80.59 FAMILY HISTORY OF MALIGNANT NEOPLASM OF URINARY TRACT: ICD-10-CM

## 2023-05-22 DIAGNOSIS — Z80.3 FAMILY HISTORY OF MALIGNANT NEOPLASM OF BREAST: ICD-10-CM

## 2023-05-22 PROCEDURE — 96040 HC GENETIC COUNSELING, EACH 30 MINUTES: CPT | Mod: GT,95 | Performed by: GENETIC COUNSELOR, MS

## 2023-05-22 NOTE — PATIENT INSTRUCTIONS
Assessing Cancer Risk  Cancer is a common diagnosis which impacts many families.  Individuals may develop cancer due to environmental factors (such as exposures and lifestyle), aging, genetic predisposition, or a combination of these factors.      Only about 5-10% of cancers are thought to be due to an inherited cancer susceptibility gene.    These families often have:  Several people with the same or related types of cancer  Cancers diagnosed at a young age (before age 50)  Individuals with more than one primary cancer  Multiple generations of the family affected with cancer    Comprehensive Breast and Gynecologic Cancer Panel  We each inherit two copies of every gene in our bodies: one from our mother, and one from our father. Each gene has a specific job to do.  When a gene has a mistake or  mutation  in it, it does not work like it should.     Some people may be candidates for genetic testing of more than one gene.  For these families, genetic testing using a cancer panel may be offered. These panels will test different genes at once known to increase the risk for breast, ovarian, uterine, and/or other cancers.    This handout will review common hereditary breast and gynecologic cancer syndromes. The genes that will be discussed in this handout are: JUAN, BRCA1, BRCA2, BRIP1, CDH1, CHEK2, MLH1, MSH2, MSH6, PMS2, EPCAM, PTEN, PALB2, RAD51C, RAD51D, and TP53.    The purpose of this handout is to serve as a brief summary of the breast and gynecologic cancer risk genes that have published clinical management guidelines for individuals who are found to carry a mutation. Inheriting a mutation does not mean a person will develop cancer, but it does significantly increase their risk above the general population risk.     ______________________________________________________________________________    Hereditary Breast and Ovarian Cancer Syndrome (BRCA1 and BRCA2)  A single mutation in one of the copies of BRCA1 or  BRCA2 increases the risk for breast and ovarian cancer, among others.  The risk for pancreatic cancer and melanoma may also be slightly increased in some families.  The chart below shows the chance that someone with a BRCA mutation would develop cancer in his or her lifetime1,2,3,4.       Lifetime Cancer Risks    General Population BRCA1  BRCA2   Breast  12% >60% >60%   Ovarian  1-2% 39-58% 13-29%   Prostate 12% 7-26% 19-61%   Male Breast 0.1% 0.2-1.2% 1.8-7.1%   Pancreas 1-2% Up to 5% 5-10%     A person s ethnic background is also important to consider, as individuals of [place]ken[place] ancestry have a higher chance of having a BRCA gene mutation.  There are three BRCA mutations that occur more frequently in this population.      Oseguera Syndrome (MLH1, MSH2, MSH6, PMS2, and EPCAM)  Currently five genes are known to cause Oseguera Syndrome: MLH1, MSH2, MSH6, PMS2, and EPCAM.  A single mutation in one of the Oseguera Syndrome genes increases the risk for colon, endometrial, ovarian, and stomach cancers.  Other cancers that occur less commonly in Oseguera Syndrome include urinary tract, skin, and brain cancers.  The chart below shows the chance that a person with Oseguera syndrome would develop cancer in his or her lifetime5.      Lifetime Cancer Risks    General Population Oseguera Syndrome   Colon 5% 10-61%   Endometrial 3% 13-57%   Ovarian 1-2% 1-38%   Stomach <1% 1-9%   *Cancer risk varies depending on Oseguera syndrome gene found      Cowden Syndrome (PTEN)  Cowden syndrome is a hereditary condition that increases the risk for breast, thyroid, endometrial, colon, and kidney cancer.  Cowden syndrome is caused by a mutation in the PTEN gene.  A single mutation in one of the copies of PTEN causes Cowden syndrome and increases cancer risk.  The chart below shows the chance that someone with a PTEN mutation would develop cancer in their lifetime6,7.  Other benign features seen in some individuals with Cowden syndrome include benign  skin lesions (facial papules, keratoses, lipomas), learning disability, autism, thyroid nodules, colon polyps, and larger head size.     Lifetime Cancer Risks    General Population Cowden   Breast 12% 40-60%*   Thyroid 1% Up to 38%   Renal 1-2% Up to 35%   Endometrial 3% Up to 28%   Colon 5% Up to 9%   Melanoma 2-3% Up to 6%   *Emerging data suggests the risk for breast cancer could be greater than 60%               Li-Fraumeni Syndrome (TP53)  Li-Fraumeni Syndrome (LFS) is a cancer predisposition syndrome caused by a mutation in the TP53 gene. A single mutation in one of the copies of TP53 increases the risk for multiple cancers. Individuals with LFS are at an increased risk for developing cancer at a young age. The lifetime risk for development of a LFS-associated cancer is 50% by age 30 and 90% by age 60.   Core Cancers: Sarcomas, Breast, Brain, Lung, Leukemias/Lymphomas, Adrenocortical carcinomas  Other Cancers: Gastrointestinal, Thyroid, Skin, Genitourinary       Hereditary Diffuse Gastric Cancer (CDH1)  Currently, one gene is known to cause hereditary diffuse gastric cancer (HDGC): CDH1.  Individuals with HDGC are at increased risk for diffuse gastric cancer and lobular breast cancer. Of people diagnosed with HDGC, 30-50% have a mutation in the CDH1 gene.  This suggests there are likely other genes that may cause HDGC that have not been identified yet.      Lifetime Cancer Risks    General Population HDGC   Diffuse Gastric  <1% ~80%   Breast 12% 41-60%       Additional Genes    JUAN  JUAN is a moderate-risk breast cancer gene. Women who have a mutation in JUAN can have between a 2-4 fold increased risk for breast cancer compared to the general population8. JUAN mutations have also been associated with increased risk for pancreatic cancer between 5-10%9. Individuals who inherit two JUAN mutations have a condition called ataxia-telangiectasia (AT).  This rare autosomal recessive condition affects the nervous system  and immune system, and is associated with progressive cerebellar ataxia beginning in childhood. Individuals with ataxia-telangiectasia often have a weakened immune system and have an increased risk for childhood cancers.    PALB2  Mutations in PALB2 have been shown to increase the risk of breast cancer up to 41-60% in some families; where individuals fall within this risk range is dependent upon family chrenzc14. PALB2 mutations have also been associated with increased risk for pancreatic cancer between 5-10%.  Individuals who inherit two PALB2 mutations--one from their mother and one from their father--have a condition called Fanconi Anemia.  This rare autosomal recessive condition is associated with short stature, developmental delay, bone marrow failure, and increased risk for childhood cancers.    CHEK2   CHEK2 is a moderate-risk breast cancer gene.  Women who have a mutation in CHEK2 have around a 2-4 fold increased risk for breast cancer compared to the general population, and this risk may be higher depending upon family history.11,12,13 The risk of colon cancer may be twice as high as the general population risk of colon cancer of 5%. Mutations in CHEK2 have also been shown to increase the risk of other cancers, including prostate, however these cancer risks are currently not well understood.    BRIP1, RAD51C and RAD51D  Mutations in RAD51C and RAD51D have been shown to increase the risk of ovarian cancer and breast cancer 14,. Mutations in BRIP1 have been shown to increase the risk of ovarian cancer and possibly female breast cancer 15 .       Lifetime Cancer Risk    General Population        BRIP1   RAD51C  RAD51D   Breast 12% Not well defined 20-40% 20-40%   Ovarian 1-2% 5-15% 10-15% 10-20%     ______________________________________________________________  Inheritance  All of the cancer syndromes reviewed above are inherited in an autosomal dominant pattern.  This means that if a parent has a mutation,  each of their children will have a 50% chance of inheriting that same mutation. Therefore, each child --male or female-- would have a 50% chance of being at increased risk for developing cancer.    Image obtained from Genetics Home Reference, 2013     Mutations in some genes can occur de sheyla, which means that a person s mutation occurred for the first time in them and was not inherited from a parent.  Now that they have the mutation, however, it can be passed on to future generations.    Genetic Testing  Genetic testing involves a blood test and will look for any harmful mutations that are associated with increased cancer risk.  If possible, it is recommended that the person(s) who has had cancer be tested before other family members.  That person will give us the most useful information about whether or not a specific gene is associated with the cancer in the family.    Results  There are three possible results of genetic testing:  Positive--a harmful mutation was identified in one or more of the genes  Negative--no mutations were identified in any of the genes tested  Variant of unknown significance--a variation in one of the genes was identified, but it is unclear how this impacts cancer risk in the family    Advantages and Disadvantages   There are advantages and disadvantages to genetic testing.    Advantages  May clarify your cancer risk  Can help you make medical decisions  May explain the cancers in your family  May give useful information to your family members (if you share your results)    Disadvantages  Possible negative emotional impact of learning about inherited cancer risk  Uncertainty in interpreting a negative test result in some situations  Possible genetic discrimination concerns (see below)    Genetic Information Nondiscrimination Act (CRUZ)  The Genetic Information Nondiscrimination Act of 2008 (CRUZ) is a federal law that protects individuals from health insurance or employment discrimination  based on a genetic test result alone (with some exceptions, including employers with fewer than 15 employees, and ).  Although rare, CRUZ  does not cover discrimination protections in terms of life insurance, long term care, or disability insurances.  Visit the National Human Liquid Research [place] website to learn more.    Reducing Cancer Risk  All of the genes described in this handout have nationally recognized cancer screening guidelines that would be recommended for individuals who test positive.  In addition to increased cancer screening, surgeries may be offered or recommended to reduce cancer risk.  Recommendations are based upon an individual s genetic test result as well as their personal and family history of cancer.    Questions to Think About Regarding Genetic Testing:  What effect will the test result have on me and my relationship with my family members if I have an inherited gene mutation?  If I don t have a gene mutation?  Should I share my test results, and how will my family react to this news, which may also affect them?  Are my children ready to learn new information that may one day affect their own health?    Hereditary Cancer Resources    FORCE: Facing Our Risk of Cancer Empowered facingourrisk.org   Bright Pink bebrightpink.org   Li-Fraumeni Syndrome Association lfsassociation.org   PTEN World PTENworld.com   No stomach for cancer, Inc. nostomachforcancer.org   Stomach cancer relief network Scrnet.org   Collaborative Group of the Americas on Inherited Colorectal Cancer (CGA) cgaicc.com    Cancer Care cancercare.org   American Cancer Society (ACS) cancer.org   National Cancer [place] (NCI) cancer.gov     Please call us if you have any questions or concerns.   Cancer Risk Management Program [phone] ([phone])  Vijay Reinoso, MS [facility] [place]  [phone]  Supriya Crocker, MS, [facility] [place] [phone]  Keisha Cash, MS, [facility] [place]  [phone]  Maggie Saravia, MS, [facility] [place]  [phone]  Zabrina David,  MS, [facility] [place]  [phone]  Josselyn Agudelo, MS, [facility] [place] [phone]  Laura Johnson, MS, [facility] [phone]    References  Michelle Evans PDP, Dallas S, Luis ZAIDI, Dominga JE, Vipul JL, Mekhi N, Regis H, Leigh O, Gloria A, Pasini B, Radijorge P, Manchapincito S, Destiny DM, Scott N, Christiano E, Demetrius H, Jacobs E, Lucinda J, Gronkody J, Derrick B, Tulinius H, Thorlacius S, Eerola H, Nevanlinna H, Agustina K, Shad OP. Average risks of breast and ovarian cancer associated with BRCA1 or BRCA2 mutations detected in case series unselected for family history: a combined analysis of 222 studies. Am J Hum Yuliya. 2003;72:1117-30.  Sri N, Caren M, [place] G.  BRCA1 and BRCA2 Hereditary Breast and Ovarian Cancer. Gene Reviews online. 2013.  Rian YC, Vik S, Kaitlin G, Wright S. Breast cancer risk among male BRCA1 and BRCA2 mutation carriers. J Natl Cancer Inst. 2007;99:1811-4.  Howard HERR, Cem I, Kirk J, Kelsey E, Marilin ER, Karen F. Risk of breast cancer in male BRCA2 carriers. J Med Yuliya. 2010;47:710-1.  National Comprehensive Cancer Network. Clinical practice guidelines in oncology, colorectal cancer screening. Available online (registration required). 2015.  Vinay MH, Babak J, Lizeth J, Jude HUNTLEY, Viky MS, Eng C. Lifetime cancer risks in individuals with germline PTEN mutations. Clin Cancer Res. 2012;18:400-7.  Emily R. Cowden Syndrome: A Critical Review of the Clinical Literature. J Yuliya . 2009:18:13-27.  Alea NATH, Shawn NUNES, Ignacio S, Nina P, Day T, Stephanie M, Silvano B, Jonathon H, Mame R, Mayra K, Jeannine L, Howard HERR, Destiny NUNES, Gee DF, Santana MR, The Breast Cancer Susceptibility Collaboration ([place]) & Nery SOUSA. JUAN mutations that cause ataxia-telangiectasia are breast cancer susceptibility alleles. Nature Genetics. 2006;38:873-875  Ji N , Antionette Y, Becky J, Olman L, Krishna KRAFT , Nino ML, Sharla S, Martha AG, Louie S, Tyshawn ML, Willow J , Dary R, Shy GUAJARDO, Mau  JR, Dulce VE, Remberto M, Vovanistein B, Gerry N, Ciro RH, Nader KW, and Feng AP. JUAN mutations in patients with hereditary pancreatic cancer. Cancer Discover. 2012;2:41-46  Gopi HERNANDEZ., et al. Breast-Cancer Risk in Families with Mutations in PALB2. NEJM. 2014; 371(6):497-506.  CHEK2 Breast Cancer Case-Control Consortium. CHEK2*1100delC and susceptibility to breast cancer: A collaborative analysis involving 10,860 breast cancer cases and 9,065 controls from 10 studies. Am J Hum Yuliya, 74 (2004), pp. 4129-5626  Charis T, Alessandra S, Sujey K, et al. Spectrum of Mutations in BRCA1, BRCA2, CHEK2, and TP53 in Families at High Risk of Breast Cancer. OTILIA. 2006;295(12):5556-8384.   Humphrey C, Floyd D, Kris NATH, et al. Risk of breast cancer in women with a CHEK2 mutation with and without a family history of breast cancer. J Clin Oncol. 2011;29:8828-0230.  Song H, Vishals E, Ramus SJ, et al. Contribution of germline mutations in the RAD51B, RAD51C, and RAD51D genes to ovarian cancer in the population. J Clin Oncol. 2015;33(26):0465-5115. Doi:10.1200/JCO.2015.61.2408.  Annabelle T, Marta DF, Silvestre P, et al. Mutations in BRIP1 confer high risk of ovarian cancer. Amada Yuliya. 2011;43(11):1840-4941. doi:10.1038/ng.955.

## 2023-05-22 NOTE — PROGRESS NOTES
Virtual Visit Details    Type of service:  Video Visit     Originating Location (pt. Location): Home  Distant Location (provider location):  Off-site  Platform used for Video Visit: Jarad  Time spent over video: 44 minutes    5/22/2023    Referring Provider: Claudia Solo MD    Presenting Information:   I spoke with Gagandeep Oswald over video today for genetic counseling to discuss his family history of cancer. This appointment was conducted virtually due to COVID-19 precautions. We talked today to review this history, cancer screening recommendations, and available genetic testing options.    Personal History:  Gagandeep is a 48 year old male. He does not have any personal history of cancer.     His first colonoscopy on 6/13/22 detected one tubular adenoma and follow-up was recommended in 5 years. He reports that he is having his first full body skin exam in August. Gagandeep reported former tobacco use for 4 years (quit in 2005) and alcohol use of 1 drink per week.    Family History: (Please see scanned pedigree for detailed family history information)  Siblings:    His brother is 66 years old and has a history of basal cell carcinoma at age 64. He also has a history of 10 or 11 colon polyps.    His sister is 68 years old and was diagnosed with bladder cancer first at age 44. She has had recurrences of this at age 51 and in her 60s. She was also diagnosed with breast cancer at age 63. Treatment included surgery and radiation. She also has a history of basal cell carcinoma at age 63 and squamous cell carcinoma at age 68. She has had between two and five colon polyps. She has had genetic testing within the last 10 years and reported that she had a predisposition related to stomach cancer risk. Gagandeep will work on getting more information and a copy of her test results if possible.    Another sister is 60 years old with no known history of cancer. She has had between two and five colon polyps. She  "reportedly had negative genetic testing.    He has one other sister (age 52) and one brother (age 64), both with no known history of cancer or colon polyps.  Maternal:    His mother was diagnosed with breast cancer at age 70. Treatment included surgery. She was then diagnosed with pancreatic cancer at age 80, and with uterine cancer at age 82. She passed away at age 82. She had genetic testing about 10 years ago which reportedly showed \"nothing concerning.\" Gagandeep thinks that this might have been testing of the BRCA1 and BRCA2 genes only.    He had one uncle who was diagnosed with brain cancer (unknown type) at age 72 and passed away shortly after this diagnosis.    His grandmother was diagnosed with breast cancer at age 92 and passed away at age 99.  Paternal:    His father was diagnosed with nonmelanoma skin cancer (ear) at age 74. He was then diagnosed with prostate cancer at age 77. Gagandeep believes this was a slow-growing type, treatment included surgery only. He was diagnosed with kidney cancer at age 83 and had a nephrectomy. He was then diagnosed with cancer of the urethra at age 85 (not related to his kidney cancer) that was metastatic to his liver at age 86. He passed away at age 87. No known genetic testing.    He had one aunt who passed away at age 93 with no known history of cancer. Gagandeep is not aware of any cancers in his three cousins or in his grandparents.    He is not aware of any genetic testing in his paternal relatives.    His maternal ethnicity is Greek, Ashkenazi Scientology. His paternal ethnicity is [place]ian.     Discussion:    Gagandeep's family history of cancer is suggestive of a hereditary cancer syndrome.    We reviewed the features of sporadic, familial, and hereditary cancers. In looking at Gagandeep's family history, it is possible that a cancer susceptibility gene is present due to his maternal family history of breast, pancreatic, and other cancers, and his " Ashkenazi Mosque ancestry, as well as his paternal family history of multiple cancers in his father.    We discussed the natural history and genetics of hereditary cancer. Based on his family history of breast and pancreatic cancer, we discussed the BRCA1 and BRCA2 genes. Mutations in these genes cause a condition known as Hereditary Breast and Ovarian Cancer syndrome (HBOC). Women with a mutation in either of these genes are at increased risk for breast and ovarian cancer. There is also an increased risk for a second primary breast cancer. Men with a mutation in either of these genes are at increased risk for breast and prostate cancer. Both women and men may also be at increased risk for pancreatic cancer and melanoma.     We discussed that there are three mutations in the BRCA genes that are more common in the Ashkenazi Mosque population. About 1 in 40 individuals of Ashkenazi Mosque background have one of these three mutations, which account for about 90% of the BRCA mutations in this population.     As it seems likely that his mother and sister have tested negative for BRCA mutations, we discussed that there are other genes in which mutations increase the risk for breast, pancreatic, and other cancers.    Additionally given his family history of bladder, urethra, kidney, prostate cancer, and colon polyps, we discussed Oseguera syndrome. Oseguera syndrome can be caused by a mutation in one of five genes: MLH1, MSH2, MSH6, PMS2, and EPCAM. The highest cancer risks associated with Oseguera syndrome include colon cancer, endometrial/uterine cancer, gastric cancer, and ovarian cancer. Other cancers have also been reported with Oseguera syndrome, such as urinary tract, pancreas, bile duct, prostate, and others.      A detailed handout regarding these genes and other genes in which mutations are associated with an increased risk for breast cancer will be provided to Gagandeep via [facility] and can be found in the after visit  summary. Topics included: inheritance pattern, cancer risks, cancer screening recommendations, and also risks, benefits and limitations of testing.      Based on his personal and family history, Gagandeep meets current National Comprehensive Cancer Network (NCCN) criteria for genetic testing of the Oseguera syndrome genes, as well as high-penetrance breast cancer susceptibility genes, specifically, BRCA1, BRCA2, CDH1, PALB2, PTEN, and TP53.       We discussed that there are additional genes that could cause increased risk for breast, pancreatic, prostate, urinary tract, and other cancers. As many of these genes present with overlapping features in a family and accurate cancer risk cannot always be established based upon the pedigree analysis alone, it would be reasonable for Gagandeep to consider panel genetic testing to analyze multiple genes at once.    We reviewed genetic testing options for Gagandeep based on his personal and family history: a panel of genes associated with an increased risk for certain cancers, or larger panel options to include genes associated with increased risk for multiple different cancer types. He expressed an interest in more broad testing and opted for a Custom Panel (a combination of the Common Hereditary Cancers Panel + Renal/Urinary Tract Cancers Panel).  Genetic testing is available for 47 genes associated with cancers of the breast, ovary, uterus, prostate and gastrointestinal system: [facility] Common Hereditary Cancers panel (APC, JUAN, AXIN2, BARD1, BMPR1A, BRCA1, BRCA2, BRIP1, CDH1, CDK4, CDKN2A, CHEK2, CTNNA1, DICER1, EPCAM, GREM1, HOXB13, KIT, MEN1, MLH1, MSH2, MSH3, MSH6, MUTYH, NBN, NF1, NTHL1, PALB2, PDGFRA, PMS2, POLD1, POLE, PTEN,RAD50, RAD51C, RAD51D, SDHA, SDHB, SDHC, SDHD, SMAD4, SMARCA4, STK11, TP53,TSC1, TSC2, VHL).    We discussed that many of these genes are associated with specific hereditary cancer syndromes and published management guidelines: Hereditary Breast  and Ovarian Cancer syndrome (BRCA1, BRCA2), Oseguera syndrome (MLH1, MSH2, MSH6, PMS2, EPCAM), Familial Adenomatous Polyposis (APC), Hereditary Diffuse Gastric Cancer (CDH1), Familial Atypical Multiple Mole Melanoma syndrome (CDK4, CDKN2A), Multiple Endocrine Neoplasia type 1 (MEN1), Juvenile Polyposis syndrome (BMPR1A, SMAD4), Cowden syndrome (PTEN), Li Fraumeni syndrome (TP53), Hereditary Paraganglioma and Pheochromocytoma syndrome (SDHA, SDHB, SDHC, SDHD), Peutz-Jeghers syndrome (STK11), MUTYH Associated Polyposis (MUTYH), Tuberous sclerosis complex (TSC1, TSC2), Von Hippel-Lindau disease (VHL), and Neurofibromatosis type 1 (NF1).   The JUAN, AXIN2, BRIP1, CHEK2, GREM1, MSH3, NBN, NTHL1, PALB2, POLD1, POLE, RAD51C, and RAD51D genes are associated with increased cancer risk and have published management guidelines for certain cancers.   The remaining genes (BARD1, CTNNA1, DICER1, HOXB13, KIT, PDGFRA, RAD50, and SMARCA4) are associated with increased cancer risk and may allow us to make medical recommendations when mutations are identified.   As noted above, his testing will also include the genes on the Renal/Urinary Tract Cancers Panel.    Due to COVID-19 precautions consent was obtained over the phone/video today. Genetic testing via a Custom Panel (a combination of the Common Hereditary Cancers Panel + Renal/Urinary Tract Cancers Panel) will be sent to [facility]. Gagandeep opted to have a saliva sample collection kit shipped directly to his home from [facility]. He will ship the kit back to [facility] for analysis. Turnaround time from date when sample is received at the lab: approximately 3-4 weeks.    Medical Management: For Gagandeep, we reviewed that the information from genetic testing may determine:    additional cancer screening for which Gagandeep may qualify (i.e. more frequent colonoscopies, upper endoscopies, more frequent dermatologic exams, etc.),    and targeted  chemotherapies if he were to develop certain cancers in the future (i.e. immunotherapy for individuals with Oseguera syndrome, PARP inhibitors, etc.).     These recommendations will be discussed in detail once genetic testing is completed.     Plan:  1) Today Gagandeep elected to proceed with genetic testing via a Custom Panel (a combination of the Common Hereditary Cancers Panel + Renal/Urinary Tract Cancers Panel) offered by [facility].  2) This information should be available in 4-5 weeks.  3) Gagandeep will be scheduled for a virtual visit (phone or video) to discuss the results.    Zabrina David MS, [facility]  Licensed, Certified Genetic Counselor  Office: [phone]  Email: [email or web address]

## 2023-05-22 NOTE — NURSING NOTE
Is the patient currently in the state of [place]? YES    Visit mode:VIDEO    If the visit is dropped, the patient can be reconnected by: VIDEO VISIT: Send to e-mail at: [email or web address]    Will anyone else be joining the visit? NO      How would you like to obtain your AVS? MyChart    Are changes needed to the allergy or medication list? NO    Reason for visit: Consult    Cassy LI

## 2023-05-22 NOTE — LETTER
5/22/2023         RE: Gagandeep Oswald  [address] [place] [address]        Dear Colleague,    Thank you for referring your patient, Gagandeep Oswald, to the [facility]. Please see a copy of my visit note below.    Virtual Visit Details    Type of service:  Video Visit     Originating Location (pt. Location): Home  Distant Location (provider location):  Off-site  Platform used for Video Visit: [employer]  Time spent over video: 44 minutes    5/22/2023    Referring Provider: Claudia Solo MD    Presenting Information:   I spoke with Gagandeep Oswald over video today for genetic counseling to discuss his family history of cancer. This appointment was conducted virtually due to COVID-19 precautions. We talked today to review this history, cancer screening recommendations, and available genetic testing options.    Personal History:  Gagandeep is a 48 year old male. He does not have any personal history of cancer.     His first colonoscopy on 6/13/22 detected one tubular adenoma and follow-up was recommended in 5 years. He reports that he is having his first full body skin exam in August. Gagandeep reported former tobacco use for 4 years (quit in 2005) and alcohol use of 1 drink per week.    Family History: (Please see scanned pedigree for detailed family history information)  Siblings:  His brother is 66 years old and has a history of basal cell carcinoma at age 64. He also has a history of 10 or 11 colon polyps.  His sister is 68 years old and was diagnosed with bladder cancer first at age 44. She has had recurrences of this at age 51 and in her 60s. She was also diagnosed with breast cancer at age 63. Treatment included surgery and radiation. She also has a history of basal cell carcinoma at age 63 and squamous cell carcinoma at age 68. She has had between two and five colon polyps. She has had genetic testing within the last 10 years and reported that she had  "a predisposition related to stomach cancer risk. Gagandeep will work on getting more information and a copy of her test results if possible.  Another sister is 60 years old with no known history of cancer. She has had between two and five colon polyps. She reportedly had negative genetic testing.  He has one other sister (age 52) and one brother (age 64), both with no known history of cancer or colon polyps.  Maternal:  His mother was diagnosed with breast cancer at age 70. Treatment included surgery. She was then diagnosed with pancreatic cancer at age 80, and with uterine cancer at age 82. She passed away at age 82. She had genetic testing about 10 years ago which reportedly showed \"nothing concerning.\" Gagandeep thinks that this might have been testing of the BRCA1 and BRCA2 genes only.  He had one uncle who was diagnosed with brain cancer (unknown type) at age 72 and passed away shortly after this diagnosis.  His grandmother was diagnosed with breast cancer at age 92 and passed away at age 99.  Paternal:  His father was diagnosed with nonmelanoma skin cancer (ear) at age 74. He was then diagnosed with prostate cancer at age 77. Gagandeep believes this was a slow-growing type, treatment included surgery only. He was diagnosed with kidney cancer at age 83 and had a nephrectomy. He was then diagnosed with cancer of the urethra at age 85 (not related to his kidney cancer) that was metastatic to his liver at age 86. He passed away at age 87. No known genetic testing.  He had one aunt who passed away at age 93 with no known history of cancer. Gagandeep is not aware of any cancers in his three cousins or in his grandparents.  He is not aware of any genetic testing in his paternal relatives.    His maternal ethnicity is Lithuanian, Ashkenazi Baptist. His paternal ethnicity is [place].     Discussion:  Gagandeep's family history of cancer is suggestive of a hereditary cancer syndrome.  We reviewed the features of " sporadic, familial, and hereditary cancers. In looking at Gagandeep's family history, it is possible that a cancer susceptibility gene is present due to his maternal family history of breast, pancreatic, and other cancers, and his Ashkenazi Religion ancestry, as well as his paternal family history of multiple cancers in his father.  We discussed the natural history and genetics of hereditary cancer. Based on his family history of breast and pancreatic cancer, we discussed the BRCA1 and BRCA2 genes. Mutations in these genes cause a condition known as Hereditary Breast and Ovarian Cancer syndrome (HBOC). Women with a mutation in either of these genes are at increased risk for breast and ovarian cancer. There is also an increased risk for a second primary breast cancer. Men with a mutation in either of these genes are at increased risk for breast and prostate cancer. Both women and men may also be at increased risk for pancreatic cancer and melanoma.   We discussed that there are three mutations in the BRCA genes that are more common in the Ashkenazi Religion population. About 1 in 40 individuals of Ashkenazi Religion background have one of these three mutations, which account for about 90% of the BRCA mutations in this population.   As it seems likely that his mother and sister have tested negative for BRCA mutations, we discussed that there are other genes in which mutations increase the risk for breast, pancreatic, and other cancers.  Additionally given his family history of bladder, urethra, kidney, prostate cancer, and colon polyps, we discussed Oseguera syndrome. Oseguera syndrome can be caused by a mutation in one of five genes: MLH1, MSH2, MSH6, PMS2, and EPCAM. The highest cancer risks associated with Oseguera syndrome include colon cancer, endometrial/uterine cancer, gastric cancer, and ovarian cancer. Other cancers have also been reported with Oseguera syndrome, such as urinary tract, pancreas, bile duct, prostate, and  others.    A detailed handout regarding these genes and other genes in which mutations are associated with an increased risk for breast cancer will be provided to Gagandeep via [employer] and can be found in the after visit summary. Topics included: inheritance pattern, cancer risks, cancer screening recommendations, and also risks, benefits and limitations of testing.    Based on his personal and family history, Gagandeep meets current National Comprehensive Cancer Network (NCCN) criteria for genetic testing of the Oseguera syndrome genes, as well as high-penetrance breast cancer susceptibility genes, specifically, BRCA1, BRCA2, CDH1, PALB2, PTEN, and TP53.     We discussed that there are additional genes that could cause increased risk for breast, pancreatic, prostate, urinary tract, and other cancers. As many of these genes present with overlapping features in a family and accurate cancer risk cannot always be established based upon the pedigree analysis alone, it would be reasonable for Gagandeep to consider panel genetic testing to analyze multiple genes at once.  We reviewed genetic testing options for Gagandeep based on his personal and family history: a panel of genes associated with an increased risk for certain cancers, or larger panel options to include genes associated with increased risk for multiple different cancer types. He expressed an interest in more broad testing and opted for a Custom Panel (a combination of the Common Hereditary Cancers Panel + Renal/Urinary Tract Cancers Panel).  Genetic testing is available for 47 genes associated with cancers of the breast, ovary, uterus, prostate and gastrointestinal system: [facility] Common Hereditary Cancers panel (APC, JUAN, AXIN2, BARD1, BMPR1A, BRCA1, BRCA2, BRIP1, CDH1, CDK4, CDKN2A, CHEK2, CTNNA1, DICER1, EPCAM, GREM1, HOXB13, KIT, MEN1, MLH1, MSH2, MSH3, MSH6, MUTYH, NBN, NF1, NTHL1, PALB2, PDGFRA, PMS2, POLD1, POLE, PTEN,RAD50, RAD51C, RAD51D, SDHA,  SDHB, SDHC, SDHD, SMAD4, SMARCA4, STK11, TP53,TSC1, TSC2, VHL).    We discussed that many of these genes are associated with specific hereditary cancer syndromes and published management guidelines: Hereditary Breast and Ovarian Cancer syndrome (BRCA1, BRCA2), Oseguera syndrome (MLH1, MSH2, MSH6, PMS2, EPCAM), Familial Adenomatous Polyposis (APC), Hereditary Diffuse Gastric Cancer (CDH1), Familial Atypical Multiple Mole Melanoma syndrome (CDK4, CDKN2A), Multiple Endocrine Neoplasia type 1 (MEN1), Juvenile Polyposis syndrome (BMPR1A, SMAD4), Cowden syndrome (PTEN), Li Fraumeni syndrome (TP53), Hereditary Paraganglioma and Pheochromocytoma syndrome (SDHA, SDHB, SDHC, SDHD), Peutz-Jeghers syndrome (STK11), MUTYH Associated Polyposis (MUTYH), Tuberous sclerosis complex (TSC1, TSC2), Von Hippel-Lindau disease (VHL), and Neurofibromatosis type 1 (NF1).   The JUAN, AXIN2, BRIP1, CHEK2, GREM1, MSH3, NBN, NTHL1, PALB2, POLD1, POLE, RAD51C, and RAD51D genes are associated with increased cancer risk and have published management guidelines for certain cancers.   The remaining genes (BARD1, CTNNA1, DICER1, HOXB13, KIT, PDGFRA, RAD50, and SMARCA4) are associated with increased cancer risk and may allow us to make medical recommendations when mutations are identified.   As noted above, his testing will also include the genes on the Renal/Urinary Tract Cancers Panel.  Due to COVID-19 precautions consent was obtained over the phone/video today. Genetic testing via a Custom Panel (a combination of the Common Hereditary Cancers Panel + Renal/Urinary Tract Cancers Panel) will be sent to [facility]. Gagandeep opted to have a saliva sample collection kit shipped directly to his home from [employer]. He will ship the kit back to [facility] for analysis. Turnaround time from date when sample is received at the lab: approximately 3-4 weeks.  Medical Management: For Gagandeep, we reviewed that the information from  genetic testing may determine:  additional cancer screening for which Gagandeep may qualify (i.e. more frequent colonoscopies, upper endoscopies, more frequent dermatologic exams, etc.),  and targeted chemotherapies if he were to develop certain cancers in the future (i.e. immunotherapy for individuals with Oseguera syndrome, PARP inhibitors, etc.).   These recommendations will be discussed in detail once genetic testing is completed.     Plan:  1) Today Gagandeep elected to proceed with genetic testing via a Custom Panel (a combination of the Common Hereditary Cancers Panel + Renal/Urinary Tract Cancers Panel) offered by [employer].  2) This information should be available in 4-5 weeks.  3) Gagandeep will be scheduled for a virtual visit (phone or video) to discuss the results.    Zabrina David MS, [facility]  Licensed, Certified Genetic Counselor  Office: [phone]  Email: [email or web address]

## 2023-05-31 PROBLEM — G47.33 OSA (OBSTRUCTIVE SLEEP APNEA): Status: ACTIVE | Noted: 2023-05-31

## 2023-05-31 NOTE — PROCEDURES
"HOME SLEEP STUDY INTERPRETATION        Patient: Gagandeep Oswald  MRN: [id number]  YOB: 1975  Study Date: 4/26/2023  PCP/Referring Provider: Claudia Solo;  Ordering Provider: Fortunato Rand MD         Indications for Home Study: Gagandeep Oswald is a 48 year old male with mild supine obstructive sleep apnea. Test done to test effectiveness of mandibular advancement device     Estimated body mass index is 31.08 kg/m  as calculated from the following:    Height as of 5/4/23: 1.863 m (6' 1.35\").    Weight as of 5/4/23: 107.9 kg (237 lb 12.8 oz).  Total score - Talpa: 9 (4/19/2023  1:04 PM)  Total Score: 4 (4/19/2023  1:06 PM)        Data: A full night home sleep study was performed recording the standard physiologic parameters including body position, movement, sound, nasal pressure, thermal oral airflow, chest and abdominal movements with respiratory inductance plethysmography, and oxygen saturation by pulse oximetry. Pulse rate was estimated by oximetry recording. This study was considered adequate based on > 4 hours of quality oximetry and respiratory recording. As specified by the AASM Manual for the Scoring of Sleep and Associated events, version 2.3, Rule VIII.D 1B, 4% oxygen desaturation scoring for hypopneas is used as a standard of care on all home sleep apnea testing.        Analysis Time:  568 minutes        Respiration:   Sleep Associated Hypoxemia: sustained hypoxemia was not present. Baseline oxygen saturation was 94%.  Time with saturation less than or equal to 88% was 0.2 minutes. The lowest oxygen saturation was 87%.   Snoring: Snoring was was not appreciated.  Respiratory events: The home study revealed a presence of 12 obstructive apneas and 16 mixed and central apneas. There were 68 hypopneas resulting in a combined apnea/hypopnea index [AHI] of 10 events per hour.  AHI was 17 per hour supine, 0 per hour prone, 3 per hour on left side, and 7 per hour on right side.   Pattern: " Excluding events noted above, respiratory rate and pattern was Normal.      Position: Percent of time spent: supine - 47%, prone - 5%, on left - 31%, on right - 16%.      Heart Rate: By pulse oximetry normal rate was noted.       Assessment:     Mild persistent obstructive sleep apnea, predominantly supine positional with use of mandibular advancement device     Sleep associated hypoxemia was not present.    Recommendations:    Consider further advancement of mandibular advancement device.    Suggest optimizing sleep hygiene and avoiding sleep deprivation.    Weight management.        Diagnosis Code(s): Obstructive Sleep Apnea G47.33    Fortunato Rand MD, May 31, 2023   Diplomate, American Board of Internal Medicine, Sleep Medicine

## 2023-07-10 ASSESSMENT — SLEEP AND FATIGUE QUESTIONNAIRES
HOW LIKELY ARE YOU TO NOD OFF OR FALL ASLEEP WHILE SITTING QUIETLY AFTER LUNCH WITHOUT ALCOHOL: WOULD NEVER DOZE
HOW LIKELY ARE YOU TO NOD OFF OR FALL ASLEEP IN A CAR, WHILE STOPPED FOR A FEW MINUTES IN TRAFFIC: WOULD NEVER DOZE
HOW LIKELY ARE YOU TO NOD OFF OR FALL ASLEEP WHILE WATCHING TV: WOULD NEVER DOZE
HOW LIKELY ARE YOU TO NOD OFF OR FALL ASLEEP WHILE SITTING AND TALKING TO SOMEONE: WOULD NEVER DOZE
HOW LIKELY ARE YOU TO NOD OFF OR FALL ASLEEP WHILE SITTING INACTIVE IN A PUBLIC PLACE: WOULD NEVER DOZE
HOW LIKELY ARE YOU TO NOD OFF OR FALL ASLEEP WHEN YOU ARE A PASSENGER IN A CAR FOR AN HOUR WITHOUT A BREAK: HIGH CHANCE OF DOZING
HOW LIKELY ARE YOU TO NOD OFF OR FALL ASLEEP WHILE LYING DOWN TO REST IN THE AFTERNOON WHEN CIRCUMSTANCES PERMIT: MODERATE CHANCE OF DOZING
HOW LIKELY ARE YOU TO NOD OFF OR FALL ASLEEP WHILE SITTING AND READING: MODERATE CHANCE OF DOZING

## 2023-07-13 ENCOUNTER — VIRTUAL VISIT (OUTPATIENT)
Dept: ONCOLOGY | Facility: CLINIC | Age: 48
End: 2023-07-13
Attending: GENETIC COUNSELOR, MS
Payer: COMMERCIAL

## 2023-07-13 DIAGNOSIS — Z15.81 MONOALLELIC MUTATION OF MEN1 GENE: Primary | ICD-10-CM

## 2023-07-13 DIAGNOSIS — Z15.89 MONOALLELIC MUTATION OF MEN1 GENE: Primary | ICD-10-CM

## 2023-07-13 PROCEDURE — 96040 HC GENETIC COUNSELING, EACH 30 MINUTES: CPT | Mod: GT,95 | Performed by: GENETIC COUNSELOR, MS

## 2023-07-13 NOTE — NURSING NOTE
Is the patient currently in the state of [place]? YES    Visit mode:VIDEO    If the visit is dropped, the patient can be reconnected by: VIDEO VISIT: Text to cell phone: [phone]    Will anyone else be joining the visit? NO      How would you like to obtain your AVS? MyChart    Are changes needed to the allergy or medication list? NO    Reason for visit: KIMBERLY CHUN

## 2023-07-13 NOTE — PROGRESS NOTES
Virtual Visit Details    Type of service:  Video Visit     Originating Location (pt. Location): Home  Distant Location (provider location):  Off-site  Platform used for Video Visit: Jarad   Time spent over video: 26 minutes    7/13/2023    Referring Provider: Claudia Solo MD    Presenting Information:   I spoke with Gagandeep over video today to discuss his genetic testing results. Testing was performed on a saliva sample collected by Gagandeep at his home. A Custom Panel (a combination of the Common Hereditary Cancers Panel + Renal/Urinary Tract Cancers Panel) was ordered from [facility] [employer]. This testing was done because of his family history of cancer.     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 We discussed that this mutation is associated with a diagnosis of multiple endocrine neoplasia type 1 and familial isolated hyperparathyroidism. We discussed the impact of this testing on Gagandeep in detail.     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 This variant is called c.1561A>T (also known as p.[id number]). Given the uncertain significance of this result, medical management decisions should NOT be made based on this test result alone.    VUS Interpretation:  We discussed several different interpretations of this inconclusive test result. It is not clear if this variant in the MSH6 gene is associated with increased cancer risk.   1. This variant may be a benign change that does not increase cancer risk.   2. This variant may be a harmful mutation that causes an increased risk for cancer.    We discussed that known pathogenic (harmful) mutations in the MSH6 gene cause Oseguera syndrome. Oseguera syndrome can be caused by a mutation in one of five genes: MLH1, MSH2,  MSH6, PMS2, and EPCAM. The highest cancer risks associated with Oseguera syndrome include colon cancer, endometrial/uterine cancer, gastric cancer, and ovarian cancer. Other cancers have also been reported with Oseguera syndrome, such as urinary tract, pancreas, bile duct, and others.      In rare situations in which both parents have a harmful mutation in the MSH6 gene, their children each have a 25% risk for constitutional mismatch repair deficiency syndrome (CMMRD). CMMRD is a disorder that causes cafe-au-lait spots and risk for childhood cancers. For individuals of childbearing age with MSH6 mutations, genetic counseling and genetic testing may be advised for their partners. If this variant is later classified as harmful, Gagandeep would be considered a carrier for CMMRD.    The laboratory is working to determine if this variant is harmful or benign, and they will contact me if it is reclassified. If this variant is determined to be a benign change, there may be a different gene or combination of genes and environment that are associated with the cancers in this family.       It is also important to consider that his relatives may have a mutation in one of the genes tested and he did not inherit it.      Gagandeep's relatives may also carry this VUS. Because it is unclear what, if any, risk is associated with this variant, clinical genetic testing for this MSH6 variant alone is not recommended for relatives.    Of note, Gagandeep tested negative for mutations or variants of uncertain significance in the following genes by sequencing and deletion/duplication analysis: APC, JUAN, AXIN2, BAP1, BARD1, BMPR1A, BRCA1, BRCA2, BRIP1, CDC73, CDH1, CDK4, CDKN1C, CDKN2A, CHEK2, CTNNA1, DICER1, DIS3L2, EPCAM, FH, FLCN, GPC3, GREM1, HOXB13, KIT, MET, MLH1, MSH2, MSH3, MUTYH, NBN, NF1, NTHL1, PALB2, PDGFRA, PMS2, POLD1, POLE, PTEN, RAD50, RAD51C, RAD51D, REST, SDHA, SDHB, SDHC, SDHD, SMAD4, SMARCA4, SMARCB1, STK11, TP53, TSC1,  "TSC2, VHL, WT1. We reviewed the autosomal dominant inheritance of these genes. Gagandeep cannot pass on a mutation in any of these genes to his children based on this test result. Mutations in these genes do not skip generations.      A copy of the test report can be found in the Laboratory tab, dated 6/1/23, and named \"LABORATORY MISCELLANEOUS ORDER\". The report is scanned in as a linked document.    Cancer Risks:   We reviewed that mutations in the MEN1 gene cause the condition called Multiple Endocrine Neoplasia Type 1 (MEN1). Individuals with this condition are at increased risk for several different symptoms/cancers/tumors, including:      Hyperparathyroidism  o This is often due to a parathyroid adenoma  o Nearly 98% of individuals with MEN1 will present with hyperparathyroidism and/or parathyroid adenomas by the age of 50    Pituitary adenomas  o Nearly 30-40% of individuals with MEN1 will have a pituitary adenoma, which can cause overgrowth, hyperthyroidism, sexual dysfunction, visual changes, and Cushing's syndrome  o The most common pituitary adenoma is a prolactinoma, though pituitary adenomas can also be associated with the secretion of other hormones and associated symptoms    Neuroendocrine tumors  o Approximately 30-80% of individuals will have one or more of these tumors  o These tumors are typically located in the gastro-entero-pancreatic tract and can cause Zollinger-Mcbride syndrome (peptic ulcer, diarrhea), abnormal insulin, anemia, venous thrombosis, skin rash, etc  o Gastrinomas are the most common type, though insulinomas, glucagonoma, VIPoma, and PPomas can also be seen    Other tumors seen with the condition MEN1:  o Carcinoid tumors (up to 10%). Common locations include the bronchi, gastrointestinal tract, pancreas, and thymus.  o Adrenocortical tumors (20-73%)  o Thyroid tumors (>25%)  o Tumors of the skin (angiofibromas, lipomas, collangenomas)    We reviewed that the exact lifetime " risks have not yet been defined for every tumor type. Also, individuals even within the same family may develop one or multiple features of MEN1. As such, Gagandeep is encouraged to contact me annually to review any new information regarding MEN1. Gagandeep verbalized understanding.     Cancer Screening and Prevention:  Management guidelines for MEN1 have been published by the National Comprehensive Cancer Network (Neuroendocrine tumors and Adrenal Tumors) and the Journal of Clinical Endocrinology and Metabolism (Cirilo et al., 2012, PMID [id number]).    We briefly reviewed the screening put forth by Cirilo et al. This screening should be reviewed with Gagandeep s physicians, who will make final screening recommendations:       Parathyroid tumor screening (begin at age 8):  o Annual biochemical test (plasma or serum): Calcium, PTH     Pancreatic neuroendocrine tumors (NET)  o Gastinoma (begin at age 20)  - Annual biochemical test (plasma or serum): gastrin (+/- gastric pH)  o Insulinoma (begin at age 5)  - Annual biochemical test (plasma or serum): fasting glucose, insulin  o Other pancreatic NET - (begin less than 10 years)  - Annual biochemical test (plasma or serum): Chromogranin-A, pancreatic polypeptide, glucagon, VIP  - Imaging: annual MRI, CT, or endoscopic ultrasound (EUS) every 1-3 years    Anterior pituitary (begin at age 5)  o Annual biochemical test (plasma or serum): Prolactin, IGF-I  o Imaging: MRI (every 3-5 years)    Adrenal (begin less than age 10 years)  o Annual biochemical test (plasma or serum): None, unless there are signs or symptoms of a functioning tumor, and/or there is a tumor >1cm identified   o Imaging: MRI or CT (conduct with pancreatic imaging)    Thymic and bronchial carcinoid (begin at age 15)  o Imaging: CT or MRI every 1-3 years    Other screening based on Gagandeep's personal and family history:    He should continue with his colonoscopies based on his personal and family  history of colon polyps, his skin exams based on his family history and personal findings, and should discuss his family history of prostate, kidney, pancreatic, and urinary tract cancers with his physicians.     Other population cancer screening options, such as those recommended by the American Cancer Society and the National Comprehensive Cancer Network (NCCN), are also appropriate for Gagandeep and his family. These screening recommendations may change if there are changes to Gagandeep's personal and/or family history of cancer. Final screening recommendations should be made by each individual's primary care provider.     We discussed that Gagandeep could participate in our Cancer Risk Management Program in which our nursing specialist provides an individual screening plan and assists with medical management. A referral was placed to see ZAIDA Johns for this service.    Of note, the above information is based on our current understanding of Gagandeep's genetic findings. Gagandeep is encouraged to reach out to me regularly regarding any pertinent updates to his personal and/or family history of cancer, as our understanding of the genetic findings in his family may change over time.     Implications for Family Members:  We reviewed that mutations in the MEN1 gene are inherited in an autosomal dominant pattern. This means that each of Gagandeep's children have a 50% chance of inheriting the same mutation. Likewise, each of his siblings has a 50% chance of having the same mutation. Extended relatives may also carry this mutation, and he is encouraged to share this information with his family members on both sides of the family. I am happy to help his relatives connect with a genetic counselor in their area if they would like to discuss testing.    Even though Gagandeep's genetic testing result was positive, other relatives may carry a different gene mutation associated with an increased  "risk for cancer. Genetic counseling is recommended for his son, siblings, maternal relatives, and paternal relatives to discuss genetic testing options. If any of these relatives do pursue genetic testing, Gagandeep is encouraged to contact me so that we may review the impact of their test results on him.    Plan:  1.  A copy of his results was released to him via the online [employer] portal.   2.  I will provide a \"Dear Relative\" letter for Gagandeep to share with his family members.  3.  A referral was placed for Gagandeep to meet with ZAIDA Johns to discuss screening associated with a MEN1 mutation.    If Gagandeep has additional questions, I encouraged him to contact me directly at [phone].     Zabrina David MS, [facility]  Licensed, Certified Genetic Counselor  Office: [phone]  Email: [email or web address]     "

## 2023-07-13 NOTE — Clinical Note
"    7/13/2023         RE: Gagandeep Oswald  [address] [place] [address]        Dear Colleague,    Thank you for referring your patient, Gagandeep Oswald, to the [facility]. Please see a copy of my visit note below.    Virtual Visit Details    Type of service:  Video Visit     Originating Location (pt. Location): {video visit patient location:[id number]::\"Home\"}  {PROVIDER LOCATION On-site should be selected for visits conducted from your clinic location or adjoining [facility] hospital, academic office, or other nearby [facility] building. Off-site should be selected for all other provider locations, including home:[id number]}  Distant Location (provider location):  {virtual location provider:[id number]}  Platform used for Video Visit: {Virtual Visit Platforms:[id number]::\"Elevation Lab\"}      Again, thank you for allowing me to participate in the care of your patient.        Sincerely,        Zabrina David GC  "

## 2023-07-13 NOTE — LETTER
Cancer Risk Management  Program Locations    [facility]  [facility]  [facility]  Mailing Address  Cancer Risk Management Program  [facility]  [address]  [place] [address]    New patient appointments  [phone]  September 10, 2023    Gagandeep HERSON Oswald  [address] [place] [address] [address] [place] [address]      Dear Gagandeep,    It was a pleasure speaking with you over video for genetic counseling on 7/13/2023. Here is a copy of the progress note from our discussion. If you have any additional questions, please feel free to call.         Referring Provider: Claudia Solo MD    Presenting Information:   I spoke with Gagandeep over video today to discuss his genetic testing results. Testing was performed on a saliva sample collected by Gagandeep at his home. A Custom Panel (a combination of the Common Hereditary Cancers Panel + Renal/Urinary Tract Cancers Panel) was ordered from [employer]. This testing was done because of his family history of cancer.     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 We discussed that this mutation is associated with a diagnosis of multiple endocrine neoplasia type 1 and familial isolated hyperparathyroidism. We discussed the impact of this testing on Gagandeep in detail.     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 This variant is called c.1561A>T (also known as p.Len962Krt). Given the uncertain significance of this result, medical management decisions should NOT be made based on this test result alone.    VUS Interpretation:  We discussed several  different interpretations of this inconclusive test result. It is not clear if this variant in the MSH6 gene is associated with increased cancer risk.   1. This variant may be a benign change that does not increase cancer risk.   2. This variant may be a harmful mutation that causes an increased risk for cancer.    We discussed that known pathogenic (harmful) mutations in the MSH6 gene cause Oseguera syndrome. Oseguera syndrome can be caused by a mutation in one of five genes: MLH1, MSH2, MSH6, PMS2, and EPCAM. The highest cancer risks associated with Oseguera syndrome include colon cancer, endometrial/uterine cancer, gastric cancer, and ovarian cancer. Other cancers have also been reported with Oseguera syndrome, such as urinary tract, pancreas, bile duct, and others.      In rare situations in which both parents have a harmful mutation in the MSH6 gene, their children each have a 25% risk for constitutional mismatch repair deficiency syndrome (CMMRD). CMMRD is a disorder that causes cafe-au-lait spots and risk for childhood cancers. For individuals of childbearing age with MSH6 mutations, genetic counseling and genetic testing may be advised for their partners. If this variant is later classified as harmful, Gagandeep would be considered a carrier for CMMRD.    The laboratory is working to determine if this variant is harmful or benign, and they will contact me if it is reclassified. If this variant is determined to be a benign change, there may be a different gene or combination of genes and environment that are associated with the cancers in this family.       It is also important to consider that his relatives may have a mutation in one of the genes tested and he did not inherit it.      Gagandeep's relatives may also carry this VUS. Because it is unclear what, if any, risk is associated with this variant, clinical genetic testing for this MSH6 variant alone is not recommended for relatives.    Of note, Gagandeep  "tested negative for mutations or variants of uncertain significance in the following genes by sequencing and deletion/duplication analysis: APC, JUAN, AXIN2, BAP1, BARD1, BMPR1A, BRCA1, BRCA2, BRIP1, CDC73, CDH1, CDK4, CDKN1C, CDKN2A, CHEK2, CTNNA1, DICER1, DIS3L2, EPCAM, FH, FLCN, GPC3, GREM1, HOXB13, KIT, MET, MLH1, MSH2, MSH3, MUTYH, NBN, NF1, NTHL1, PALB2, PDGFRA, PMS2, POLD1, POLE, PTEN, RAD50, RAD51C, RAD51D, REST, SDHA, SDHB, SDHC, SDHD, SMAD4, SMARCA4, SMARCB1, STK11, TP53, TSC1, TSC2, VHL, WT1. We reviewed the autosomal dominant inheritance of these genes. Gagandeep cannot pass on a mutation in any of these genes to his children based on this test result. Mutations in these genes do not skip generations.      A copy of the test report can be found in the Laboratory tab, dated 6/1/23, and named \"LABORATORY MISCELLANEOUS ORDER\". The report is scanned in as a linked document.    Cancer Risks:   We reviewed that mutations in the MEN1 gene cause the condition called Multiple Endocrine Neoplasia Type 1 (MEN1). Individuals with this condition are at increased risk for several different symptoms/cancers/tumors, including:      Hyperparathyroidism  o This is often due to a parathyroid adenoma  o Nearly 98% of individuals with MEN1 will present with hyperparathyroidism and/or parathyroid adenomas by the age of 50    Pituitary adenomas  o Nearly 30-40% of individuals with MEN1 will have a pituitary adenoma, which can cause overgrowth, hyperthyroidism, sexual dysfunction, visual changes, and Cushing's syndrome  o The most common pituitary adenoma is a prolactinoma, though pituitary adenomas can also be associated with the secretion of other hormones and associated symptoms    Neuroendocrine tumors  o Approximately 30-80% of individuals will have one or more of these tumors  o These tumors are typically located in the gastro-entero-pancreatic tract and can cause Zollinger-Mcbride syndrome (peptic ulcer, diarrhea), " abnormal insulin, anemia, venous thrombosis, skin rash, etc  o Gastrinomas are the most common type, though insulinomas, glucagonoma, VIPoma, and PPomas can also be seen    Other tumors seen with the condition MEN1:  o Carcinoid tumors (up to 10%). Common locations include the bronchi, gastrointestinal tract, pancreas, and thymus.  o Adrenocortical tumors (20-73%)  o Thyroid tumors (>25%)  o Tumors of the skin (angiofibromas, lipomas, collangenomas)    We reviewed that the exact lifetime risks have not yet been defined for every tumor type. Also, individuals even within the same family may develop one or multiple features of MEN1. As such, Gagandeep is encouraged to contact me annually to review any new information regarding MEN1. Gagandeep verbalized understanding.     Cancer Screening and Prevention:  Management guidelines for MEN1 have been published by the National Comprehensive Cancer Network (Neuroendocrine tumors and Adrenal Tumors) and the Journal of Clinical Endocrinology and Metabolism (Cirilo et al., 2012, PMID [id number]).    We briefly reviewed the screening put forth by Cirilo et al. This screening should be reviewed with Gagandeep mack physicians, who will make final screening recommendations:       Parathyroid tumor screening (begin at age 8):  o Annual biochemical test (plasma or serum): Calcium, PTH     Pancreatic neuroendocrine tumors (NET)  o Gastinoma (begin at age 20)  - Annual biochemical test (plasma or serum): gastrin (+/- gastric pH)  o Insulinoma (begin at age 5)  - Annual biochemical test (plasma or serum): fasting glucose, insulin  o Other pancreatic NET - (begin less than 10 years)  - Annual biochemical test (plasma or serum): Chromogranin-A, pancreatic polypeptide, glucagon, VIP  - Imaging: annual MRI, CT, or endoscopic ultrasound (EUS) every 1-3 years    Anterior pituitary (begin at age 5)  o Annual biochemical test (plasma or serum): Prolactin, IGF-I  o Imaging: MRI (every 3-5  years)    Adrenal (begin less than age 10 years)  o Annual biochemical test (plasma or serum): None, unless there are signs or symptoms of a functioning tumor, and/or there is a tumor >1cm identified   o Imaging: MRI or CT (conduct with pancreatic imaging)    Thymic and bronchial carcinoid (begin at age 15)  o Imaging: CT or MRI every 1-3 years    Other screening based on Gagandeep's personal and family history:    He should continue with his colonoscopies based on his personal and family history of colon polyps, his skin exams based on his family history and personal findings, and should discuss his family history of prostate, kidney, pancreatic, and urinary tract cancers with his physicians.     Other population cancer screening options, such as those recommended by the American Cancer Society and the National Comprehensive Cancer Network (NCCN), are also appropriate for Gagandeep and his family. These screening recommendations may change if there are changes to Gagandeep's personal and/or family history of cancer. Final screening recommendations should be made by each individual's primary care provider.     We discussed that Gagandeep could participate in our Cancer Risk Management Program in which our nursing specialist provides an individual screening plan and assists with medical management. A referral was placed to see ZAIDA Johns for this service.    Of note, the above information is based on our current understanding of Gagandeep's genetic findings. Gagandeep is encouraged to reach out to me regularly regarding any pertinent updates to his personal and/or family history of cancer, as our understanding of the genetic findings in his family may change over time.     Implications for Family Members:  We reviewed that mutations in the MEN1 gene are inherited in an autosomal dominant pattern. This means that each of Gagandeep's children have a 50% chance of inheriting the same mutation.  "Likewise, each of his siblings has a 50% chance of having the same mutation. Extended relatives may also carry this mutation, and he is encouraged to share this information with his family members on both sides of the family. I am happy to help his relatives connect with a genetic counselor in their area if they would like to discuss testing.    Even though Gagandeep's genetic testing result was positive, other relatives may carry a different gene mutation associated with an increased risk for cancer. Genetic counseling is recommended for his son, siblings, maternal relatives, and paternal relatives to discuss genetic testing options. If any of these relatives do pursue genetic testing, Gagandeep is encouraged to contact me so that we may review the impact of their test results on him.    Plan:  1.  A copy of his results was released to him via the online [employer] portal.   2.  I will provide a \"Dear Relative\" letter for Gagandeep to share with his family members.  3.  A referral was placed for Gagandeep to meet with ZAIDA Johns to discuss screening associated with a MEN1 mutation.    If Gagandeep has additional questions, I encouraged him to contact me directly at [phone].     Zabrina David MS, [facility]  Licensed, Certified Genetic Counselor  Office: [phone]  Email: [email or web address]                                                                         Dear Relative:  The purpose of this letter is to inform you that your relative recently underwent genetic counseling and genetic testing due to the family history of cancer.  Testing done through [employer] identified a mutation in the MEN1 gene. Specifically, the mutation is called c.652C>T (also known as p.Iou287Qqs). The accession number linked to their test result is [id number].    A mutation (or change in the genetic code) causes a specific gene to stop working properly.  Ultimately, individuals who have a mutation " in the MEN1 gene have a diagnosis of multiple endocrine neoplasia type 1 and familial isolated hyperparathyroidism.  Mutations in the MEN1 gene cause multiple endocrine neoplasia, which affects the endocrine system, which produces hormones. Individuals with multiple endocrine neoplasia can develop multiple tumors of the endocrine glands, which can be malignant or benign. Individuals with multiple endocrine neoplasia type 1 (caused by mutations in the MEN1 gene) typically develop tumors of the parathyroid gland, pituitary gland, and gastro-entero-pancreatic (GEP) tract. Individuals can also have hyperparathyroidism and are at risk to develop carcinoid tumors.  Both men and women have a 50% chance of passing this mutation on to each of their children. If individuals are found to have a mutation in the MEN1 gene, we would discuss increased screening beginning in childhood.     I understand that this may be surprising, unexpected, and even scary news. Because this mutation has been identified in your family, you are at risk for having the same mutation.    Scheduling a genetic counseling appointment does not mean you have to undergo genetic testing. The decision to pursue such testing is a very personal one that is discussed in more detail during the session. Much of cancer genetic counseling is providing information to individuals who are impacted by genetic information such as this.    If you are interested in scheduling a genetic counseling appointment at [facility], please call [phone] to schedule an appointment. You can also find a genetic counselor close to you or at another health system at [employer]  Sincerely,  Zabrina David MS, [facility]  Licensed, Certified Genetic Counselor  Office: [phone]  Email: [email or web address]

## 2023-07-17 ENCOUNTER — VIRTUAL VISIT (OUTPATIENT)
Dept: SLEEP MEDICINE | Facility: CLINIC | Age: 48
End: 2023-07-17
Payer: COMMERCIAL

## 2023-07-17 VITALS — BODY MASS INDEX: 29.82 KG/M2 | WEIGHT: 225 LBS | HEIGHT: 73 IN

## 2023-07-17 DIAGNOSIS — G47.33 OBSTRUCTIVE SLEEP APNEA: Primary | ICD-10-CM

## 2023-07-17 PROCEDURE — 99213 OFFICE O/P EST LOW 20 MIN: CPT | Mod: VID | Performed by: PHYSICIAN ASSISTANT

## 2023-07-17 ASSESSMENT — PAIN SCALES - GENERAL: PAINLEVEL: NO PAIN (0)

## 2023-07-17 NOTE — PROGRESS NOTES
"Virtual Visit Details    Type of service:  Video Visit     Originating Location (pt. Location): Home    Distant Location (provider location):  On-site  Platform used for Video Visit: [employer]     Home Sleep Apnea Testing Results Visit:    Chief Complaint   Patient presents with     RECHECK       Gagandeep Oswald is a 48 year old male who returns to [facility] after having had Home Sleep Apnea Testing.  He presented with history of mild sleep apnea managed with a  mandibular advancement device.    Home Sleep Apnea Testing - 4/26/23: 237 lbs 0 oz: AHI 10/hr. Supine AHI 17/hr.   Oxygen Josh of 87%.  Baseline 94%.  Sp02 =< 88% for 0.2 minutes  He slept on his back (47%), prone (5%), left (31%) and right (16%) sides.   Analysis time: 568 minutes.     Signal quality of Oxymeter 100% Good  Nasal Cannula 100% Good  RIP belts 100% Good.     Gagandeep Osawld reports that he slept Fair.     Past medical/surgical history, family history, social history, medications and allergies were reviewed.      Ht 1.854 m (6' 1\")   Wt 102.1 kg (225 lb)   BMI 29.69 kg/m      Impression/Plan:  Mild Obstructive Sleep Apnea with use of a mandibular advancement device. Events principally confined to supine sleep.   Sleep associated hypoxemia was not present.    Home Sleep Apnea Testing was reviewed in detail today with Gagandeep and a copy given to him for his records.  We reviewed options including more protrusion of the jaw, adding positional therapy, weight losss or starting auto-CPAP. He will go back to the dentist for more protrusion of the jaw and add positional therapy.     Follow up in 6-12 months.      20 minutes spent on day of encounter doing chart review, history and exam, counseling, coordinating plan of care, documentation and further activities as noted above.      Landon Herron PA-C  Sleep Medicine      "

## 2023-07-17 NOTE — NURSING NOTE
Is the patient currently in the state of [place]? YES    Visit mode:VIDEO    If the visit is dropped, the patient can be reconnected by: VIDEO VISIT: Send to e-mail at: [email or web address]    Will anyone else be joining the visit? NO      How would you like to obtain your AVS? MyChart    Are changes needed to the allergy or medication list? NO    Reason for visit: RECHECK    Has patient had flu shot for current/most recent flu season? If so, when? No

## 2023-08-07 ENCOUNTER — OFFICE VISIT (OUTPATIENT)
Dept: URGENT CARE | Facility: URGENT CARE | Age: 48
End: 2023-08-07
Payer: COMMERCIAL

## 2023-08-07 VITALS
WEIGHT: 236 LBS | HEART RATE: 72 BPM | DIASTOLIC BLOOD PRESSURE: 80 MMHG | BODY MASS INDEX: 31.14 KG/M2 | RESPIRATION RATE: 12 BRPM | SYSTOLIC BLOOD PRESSURE: 127 MMHG | OXYGEN SATURATION: 98 % | TEMPERATURE: 98.5 F

## 2023-08-07 DIAGNOSIS — R10.31 ABDOMINAL PAIN, RIGHT LOWER QUADRANT: Primary | ICD-10-CM

## 2023-08-07 PROCEDURE — 99213 OFFICE O/P EST LOW 20 MIN: CPT | Performed by: PHYSICIAN ASSISTANT

## 2023-08-07 ASSESSMENT — ENCOUNTER SYMPTOMS
COUGH: 0
CONSTITUTIONAL NEGATIVE: 1
WHEEZING: 0
VOMITING: 0
CARDIOVASCULAR NEGATIVE: 1
MUSCULOSKELETAL NEGATIVE: 1
DYSURIA: 0
HEADACHES: 0
MYALGIAS: 0
CHILLS: 0
SORE THROAT: 0
PALPITATIONS: 0
ALLERGIC/IMMUNOLOGIC NEGATIVE: 1
RESPIRATORY NEGATIVE: 1
CHEST TIGHTNESS: 0
SHORTNESS OF BREATH: 0
ABDOMINAL PAIN: 1
DIARRHEA: 0
HEMATURIA: 0
NAUSEA: 0
NEUROLOGICAL NEGATIVE: 1
FEVER: 0
FREQUENCY: 0

## 2023-08-07 ASSESSMENT — PAIN SCALES - GENERAL: PAINLEVEL: MILD PAIN (2)

## 2023-08-07 NOTE — PROGRESS NOTES
Chief Complaint:    Chief Complaint   Patient presents with    Abdominal Pain     Per pt ,RLQ been ongoing for the past two weeks. Worse today after the stair climber. - dull , annoying pain.      Medical Decision Making:    Vital signs reviewed by Joe Patel PA-C  /80   Pulse 72   Temp 98.5  F (36.9  C) (Tympanic)   Resp 12   Wt 107 kg (236 lb)   SpO2 98%   BMI 31.14 kg/m      Differential Diagnosis:  Abdominal Pain: GB/Cholelithiasis, Appendix, and hernia      ASSESSMENT:     1. Abdominal pain, right lower quadrant       PLAN:     Patient is in no acute distress.  He is afebrile with stable vital signs.    Abdominal exam was benign.   Low suspicion for acute appendix or gallbladder.  No definitive hernia palpated.    Patient will continue to monitor this and follow up with his PCP if symptoms have not resolved in 2 weeks.    Worrisome symptoms discussed with instructions to go to the ED.  Patient verbalized understanding and agreed with this plan.    Labs:     No results found for any visits on 08/07/23.    Current Meds:    Current Outpatient Medications:     co-enzyme Q-10 100 MG CAPS capsule, , Disp: , Rfl:     fish oil-omega-3 fatty acids 1000 MG capsule, , Disp: , Rfl:     Multiple Vitamin (MULTIVITAMINS PO), Take  by mouth., Disp: , Rfl:     hydrOXYzine (ATARAX) 25 MG tablet, TAKE 1/2-1 TABLET BY MOUTH ONCE DAILY AT BEDTIME (Patient not taking: Reported on 8/7/2023), Disp: 90 tablet, Rfl: 0    Allergies:  Allergies   Allergen Reactions    Nickel Itching and Rash       SUBJECTIVE    HPI: Gagandeep Oswald is an 48 year old male who presents for evaluation and treatment of RLQ abdominal pain.  Symptoms started roughly 1 week ago and have not changed.  The pain is sharp in nature and comes and goes.  Nothing makes the pain better or worse.  He has not tried anything for the pain.  No fever, chills, nausea, vomiting, or diarrhea.  No recent travel.    ROS:      Review of Systems    Constitutional: Negative.  Negative for chills and fever.   HENT: Negative.  Negative for sore throat.    Respiratory: Negative.  Negative for cough, chest tightness, shortness of breath and wheezing.    Cardiovascular: Negative.  Negative for chest pain and palpitations.   Gastrointestinal:  Positive for abdominal pain. Negative for diarrhea, nausea and vomiting.   Genitourinary:  Negative for dysuria, frequency, hematuria and urgency.   Musculoskeletal: Negative.  Negative for myalgias.   Skin:  Negative for rash.   Allergic/Immunologic: Negative.  Negative for immunocompromised state.   Neurological: Negative.  Negative for headaches.        Family History   Family History   Problem Relation Age of Onset    Allergies Mother     Arthritis Mother     Breast Cancer Mother     Diabetes Mother     Obesity Mother     Pancreatic Cancer Mother         d age 80    Other Cancer Mother         Pancreatic    Obesity Brother     Diabetes Brother     C.A.D. Father 82        MI    Cancer Father         skin    Prostate Cancer Father     Kidney Cancer Father         d age 85    Hypertension Father     Other Cancer Father         Removed age 85    Obesity Sister     Obesity Sister     Colon Cancer No family hx of        Social History  Social History     Socioeconomic History    Marital status:      Spouse name: Not on file    Number of children: 1    Years of education: 16    Highest education level: Not on file   Occupational History    Occupation: IT      Employer: [employer]   Tobacco Use    Smoking status: Former     Packs/day: 0.50     Years: 3.00     Pack years: 1.50     Types: Cigarettes     Quit date: 2005     Years since quittin.6    Smokeless tobacco: Never   Vaping Use    Vaping Use: Never used   Substance and Sexual Activity    Alcohol use: Yes     Alcohol/week: 8.3 standard drinks of alcohol     Comment: 6 drinks a week    Drug use: No    Sexual activity: Yes     Partners: Female     Birth  control/protection: Condom, Male Surgical   Other Topics Concern    Parent/sibling w/ CABG, MI or angioplasty before 65F 55M? No   Social History Narrative    Not on file     Social Determinants of Health     Financial Resource Strain: Not on file   Food Insecurity: Not on file   Transportation Needs: Not on file   Physical Activity: Not on file   Stress: Not on file   Social Connections: Not on file   Intimate Partner Violence: Not on file   Housing Stability: Not on file        Surgical History:  Past Surgical History:   Procedure Laterality Date    COLONOSCOPY N/A 6/13/2022    Procedure: COLONOSCOPY, FLEXIBLE, WITH LESION REMOVAL USING SNARE;  Surgeon: Mukund Hayes MD;  Location: [facility] OR    COLONOSCOPY WITH CO2 INSUFFLATION N/A 6/13/2022    Procedure: COLONOSCOPY, WITH CO2 INSUFFLATION;  Surgeon: Mukund Hayes MD;  Location: [facility] OR    ENDOSCOPIC SINUS SURGERY  1/24/2013    Procedure: ENDOSCOPIC SINUS SURGERY;  Bilateral total ethmoidectomy, bilateral endoscopic maxillary antrostomy;  Surgeon: Han Toledo MD;  Location: [facility] OR    ENT SURGERY  1/24/2013    Surgery to correct sinus infection    MOUTH SURGERY  6/2012    [place] Teeth Removed    VASECTOMY          Problem List:  Patient Active Problem List   Diagnosis    Hyperlipidemia with target LDL less than 160    History of sinus surgery    PAF (paroxysmal atrial fibrillation) (H)    Family history of colonic polyps    Anxiety    Class 1 obesity due to excess calories without serious comorbidity with body mass index (BMI) of 30.0 to 30.9 in adult    ALAYNA (obstructive sleep apnea)           OBJECTIVE:     Vital signs noted and reviewed by Joe Patel PA-C  /80   Pulse 72   Temp 98.5  F (36.9  C) (Tympanic)   Resp 12   Wt 107 kg (236 lb)   SpO2 98%   BMI 31.14 kg/m       PEFR:    Physical Exam  Vitals and nursing note reviewed.   Constitutional:       General: He is not in acute distress.     Appearance: He is  well-developed. He is not ill-appearing, toxic-appearing or diaphoretic.   HENT:      Head: Normocephalic and atraumatic.      Right Ear: Hearing, tympanic membrane, ear canal and external ear normal. Tympanic membrane is not perforated, erythematous, retracted or bulging.      Left Ear: Hearing, tympanic membrane, ear canal and external ear normal. Tympanic membrane is not perforated, erythematous, retracted or bulging.      Nose: Nose normal. No mucosal edema, congestion or rhinorrhea.      Mouth/Throat:      Pharynx: No oropharyngeal exudate or posterior oropharyngeal erythema.      Tonsils: No tonsillar exudate or tonsillar abscesses. 0 on the right. 0 on the left.   Eyes:      Pupils: Pupils are equal, round, and reactive to light.   Cardiovascular:      Rate and Rhythm: Normal rate and regular rhythm.      Heart sounds: Normal heart sounds, S1 normal and S2 normal. Heart sounds not distant. No murmur heard.     No friction rub. No gallop.   Pulmonary:      Effort: Pulmonary effort is normal. No respiratory distress.      Breath sounds: Normal breath sounds. No decreased breath sounds, wheezing, rhonchi or rales.   Abdominal:      General: Bowel sounds are normal. There is no distension.      Palpations: Abdomen is soft.      Tenderness: There is no abdominal tenderness. There is no right CVA tenderness, left CVA tenderness, guarding or rebound. Negative signs include Winchester's sign and McBurney's sign.   Musculoskeletal:      Cervical back: Normal range of motion and neck supple.   Lymphadenopathy:      Cervical: No cervical adenopathy.   Skin:     General: Skin is warm and dry.      Findings: No rash.   Neurological:      Mental Status: He is alert.      Cranial Nerves: No cranial nerve deficit.   Psychiatric:         Attention and Perception: He is attentive.         Speech: Speech normal.         Behavior: Behavior normal. Behavior is cooperative.         Thought Content: Thought content normal.          Judgment: Judgment normal.             Joe Patel PA-C  8/7/2023, 5:51 PM

## 2023-08-30 ENCOUNTER — OFFICE VISIT (OUTPATIENT)
Dept: DERMATOLOGY | Facility: CLINIC | Age: 48
End: 2023-08-30
Attending: FAMILY MEDICINE
Payer: COMMERCIAL

## 2023-08-30 DIAGNOSIS — L82.1 SEBORRHEIC KERATOSIS: ICD-10-CM

## 2023-08-30 DIAGNOSIS — D18.01 CHERRY ANGIOMA: ICD-10-CM

## 2023-08-30 DIAGNOSIS — L81.4 LENTIGO: Primary | ICD-10-CM

## 2023-08-30 DIAGNOSIS — D22.9 MULTIPLE BENIGN NEVI: ICD-10-CM

## 2023-08-30 PROCEDURE — 99213 OFFICE O/P EST LOW 20 MIN: CPT | Performed by: PHYSICIAN ASSISTANT

## 2023-08-30 NOTE — PROGRESS NOTES
Gagandeep Oswald is an extremely pleasant 48 year old year old male patient here today for skin check. No changing or new nevi. No painful or bleeding skin lesions.  He denies blistering sunburns in her past, infrequent tanning bed history.  Patient has no other skin complaints today.  Remainder of the HPI, Meds, PMH, Allergies, FH, and SH was reviewed in chart.    No personal history of skin cancer. Family history of NMSC  Past Medical History:   Diagnosis Date    Facial nerve palsy     LT congenital     Hyperlipidemia LDL goal < 160 5/7/2012    Kidney stone 9/20/2021    Nephrolithiasis     has had it 3 times-last one was in 2016    New onset atrial fibrillation (H) 5/17/2021    Varicocele     LT       Past Surgical History:   Procedure Laterality Date    COLONOSCOPY N/A 6/13/2022    Procedure: COLONOSCOPY, FLEXIBLE, WITH LESION REMOVAL USING SNARE;  Surgeon: Mukund Hayes MD;  Location: [facility]    COLONOSCOPY WITH CO2 INSUFFLATION N/A 6/13/2022    Procedure: COLONOSCOPY, WITH CO2 INSUFFLATION;  Surgeon: Mukund Hayes MD;  Location: [facility]    ENDOSCOPIC SINUS SURGERY  1/24/2013    Procedure: ENDOSCOPIC SINUS SURGERY;  Bilateral total ethmoidectomy, bilateral endoscopic maxillary antrostomy;  Surgeon: Han Toledo MD;  Location: [facility]    ENT SURGERY  1/24/2013    Surgery to correct sinus infection    MOUTH SURGERY  6/2012    [place] Teeth Removed    VASECTOMY          Family History   Problem Relation Age of Onset    Allergies Mother     Arthritis Mother     Breast Cancer Mother 71    Diabetes Mother     Obesity Mother     Pancreatic Cancer Mother         d age 80    Other Cancer Mother         Pancreatic    Obesity Brother     Diabetes Brother     C.A.D. Father 82        MI    Cancer Father         skin    Prostate Cancer Father     Kidney Cancer Father         d age 85    Hypertension Father     Other Cancer Father         Removed age 85    Obesity Sister     Obesity Sister     Colon  Cancer No family hx of        Social History     Socioeconomic History    Marital status:      Spouse name: Not on file    Number of children: 1    Years of education: 16    Highest education level: Not on file   Occupational History    Occupation: IT      Employer: [employer]   Tobacco Use    Smoking status: Former     Packs/day: 0.50     Years: 3.00     Pack years: 1.50     Types: Cigarettes     Quit date: 2005     Years since quittin.6    Smokeless tobacco: Never   Vaping Use    Vaping Use: Never used   Substance and Sexual Activity    Alcohol use: Yes     Alcohol/week: 8.3 standard drinks of alcohol     Comment: 6 drinks a week    Drug use: No    Sexual activity: Yes     Partners: Female     Birth control/protection: Condom, Male Surgical   Other Topics Concern    Parent/sibling w/ CABG, MI or angioplasty before 65F 55M? No   Social History Narrative    Not on file     Social Determinants of Health     Financial Resource Strain: Not on file   Food Insecurity: Not on file   Transportation Needs: Not on file   Physical Activity: Not on file   Stress: Not on file   Social Connections: Not on file   Intimate Partner Violence: Not on file   Housing Stability: Not on file       Outpatient Encounter Medications as of 2023   Medication Sig Dispense Refill    co-enzyme Q-10 100 MG CAPS capsule       fish oil-omega-3 fatty acids 1000 MG capsule       hydrOXYzine (ATARAX) 25 MG tablet TAKE 1/2-1 TABLET BY MOUTH ONCE DAILY AT BEDTIME (Patient not taking: Reported on 2023) 90 tablet 0    Multiple Vitamin (MULTIVITAMINS PO) Take  by mouth.       No facility-administered encounter medications on file as of 2023.             O:   NAD, WDWN, Alert & Oriented, Mood & Affect wnl, Vitals stable   Here today alone   General appearance normal   Vitals stable   Alert, oriented and in no acute distress     Stuck on papules and brown macules on trunk and ext   Red papules on trunk  Brown papules and macules  with regular pigment network and border on torso and extremities          Eyes: Conjunctivae/lids:Normal     ENT: Lips normal    MSK:Normal    Cardiovascular: peripheral edema none    Pulm: Breathing Normal    Neuro/Psych: Orientation:Alert and Orientedx3 ; Mood/Affect:normal       A/P:  1. Seborrheic keratosis, lentigo, angioma, benign nevi  It was a pleasure speaking to Gagandeep Oswald today.  Previous clinic notes and pertinent laboratory tests were reviewed prior to Gagandeep Oswald's visit.  Nature of benign skin lesions dicussed with patient.  Signs and Symptoms of skin cancer discussed with patient.  Patient encouraged to perform monthly skin exams.  UV precautions reviewed with patient.  Return to clinic in 1-2 years or sooner if needed.

## 2023-08-30 NOTE — LETTER
8/30/2023         RE: Gagandeep Oswald  [address] [place] [address]        Dear Colleague,    Thank you for referring your patient, Gagandeep Oswald, to the [facility]. Please see a copy of my visit note below.    Gagandeep Oswald is an extremely pleasant 48 year old year old male patient here today for skin check. No changing or new nevi. No painful or bleeding skin lesions.  He denies blistering sunburns in her past, infrequent tanning bed history.  Patient has no other skin complaints today.  Remainder of the HPI, Meds, PMH, Allergies, FH, and SH was reviewed in chart.    No personal history of skin cancer. Family history of NMSC  Past Medical History:   Diagnosis Date     Facial nerve palsy     LT congenital      Hyperlipidemia LDL goal < 160 5/7/2012     Kidney stone 9/20/2021     Nephrolithiasis     has had it 3 times-last one was in 2016     New onset atrial fibrillation (H) 5/17/2021     Varicocele     LT       Past Surgical History:   Procedure Laterality Date     COLONOSCOPY N/A 6/13/2022    Procedure: COLONOSCOPY, FLEXIBLE, WITH LESION REMOVAL USING SNARE;  Surgeon: Mukund Hayes MD;  Location: [facility]     COLONOSCOPY WITH CO2 INSUFFLATION N/A 6/13/2022    Procedure: COLONOSCOPY, WITH CO2 INSUFFLATION;  Surgeon: Mukund Hayes MD;  Location: [facility]     ENDOSCOPIC SINUS SURGERY  1/24/2013    Procedure: ENDOSCOPIC SINUS SURGERY;  Bilateral total ethmoidectomy, bilateral endoscopic maxillary antrostomy;  Surgeon: Han Toledo MD;  Location: [facility]     ENT SURGERY  1/24/2013    Surgery to correct sinus infection     MOUTH SURGERY  6/2012    [place] Teeth Removed     VASECTOMY          Family History   Problem Relation Age of Onset     Allergies Mother      Arthritis Mother      Breast Cancer Mother 71     Diabetes Mother      Obesity Mother      Pancreatic Cancer Mother         d age 80     Other Cancer Mother         Pancreatic      Obesity Brother      Diabetes Brother      C.A.D. Father 82        MI     Cancer Father         skin     Prostate Cancer Father      Kidney Cancer Father         d age 85     Hypertension Father      Other Cancer Father         Removed age 85     Obesity Sister      Obesity Sister      Colon Cancer No family hx of        Social History     Socioeconomic History     Marital status:      Spouse name: Not on file     Number of children: 1     Years of education: 16     Highest education level: Not on file   Occupational History     Occupation: IT      Employer: [employer]   Tobacco Use     Smoking status: Former     Packs/day: 0.50     Years: 3.00     Pack years: 1.50     Types: Cigarettes     Quit date: 2005     Years since quittin.6     Smokeless tobacco: Never   Vaping Use     Vaping Use: Never used   Substance and Sexual Activity     Alcohol use: Yes     Alcohol/week: 8.3 standard drinks of alcohol     Comment: 6 drinks a week     Drug use: No     Sexual activity: Yes     Partners: Female     Birth control/protection: Condom, Male Surgical   Other Topics Concern     Parent/sibling w/ CABG, MI or angioplasty before 65F 55M? No   Social History Narrative     Not on file     Social Determinants of Health     Financial Resource Strain: Not on file   Food Insecurity: Not on file   Transportation Needs: Not on file   Physical Activity: Not on file   Stress: Not on file   Social Connections: Not on file   Intimate Partner Violence: Not on file   Housing Stability: Not on file       Outpatient Encounter Medications as of 2023   Medication Sig Dispense Refill     co-enzyme Q-10 100 MG CAPS capsule        fish oil-omega-3 fatty acids 1000 MG capsule        hydrOXYzine (ATARAX) 25 MG tablet TAKE 1/2-1 TABLET BY MOUTH ONCE DAILY AT BEDTIME (Patient not taking: Reported on 2023) 90 tablet 0     Multiple Vitamin (MULTIVITAMINS PO) Take  by mouth.       No facility-administered encounter medications on  file as of 8/30/2023.             O:   NAD, WDWN, Alert & Oriented, Mood & Affect wnl, Vitals stable   Here today alone   General appearance normal   Vitals stable   Alert, oriented and in no acute distress     Stuck on papules and brown macules on trunk and ext   Red papules on trunk  Brown papules and macules with regular pigment network and border on torso and extremities          Eyes: Conjunctivae/lids:Normal     ENT: Lips normal    MSK:Normal    Cardiovascular: peripheral edema none    Pulm: Breathing Normal    Neuro/Psych: Orientation:Alert and Orientedx3 ; Mood/Affect:normal       A/P:  1. Seborrheic keratosis, lentigo, angioma, benign nevi  It was a pleasure speaking to Gagandeep Oswald today.  Previous clinic notes and pertinent laboratory tests were reviewed prior to Gagandeep Oswald's visit.  Nature of benign skin lesions dicussed with patient.  Signs and Symptoms of skin cancer discussed with patient.  Patient encouraged to perform monthly skin exams.  UV precautions reviewed with patient.  Return to clinic in 1-2 years or sooner if needed.       Again, thank you for allowing me to participate in the care of your patient.        Sincerely,        Elli Greene PA-C

## 2023-09-10 PROBLEM — Z15.89: Status: ACTIVE | Noted: 2023-07-13

## 2023-09-10 PROBLEM — Z15.81: Status: ACTIVE | Noted: 2023-07-13

## 2023-09-11 ENCOUNTER — PATIENT OUTREACH (OUTPATIENT)
Dept: ONCOLOGY | Facility: CLINIC | Age: 48
End: 2023-09-11
Payer: COMMERCIAL

## 2023-09-11 NOTE — PROGRESS NOTES
Writer received referral to Cancer Risk Management/Genetic Counseling.    Referred for: to see [facility]GEL[facility] for screening associated with MEN1 mutation      [place] Zabrina David, [facility]    Referral reviewed for appropriate plan, and sent to New Patient Scheduling for completion.    Heaven Benoit, RN, BSN  Oncology New Patient Nurse Navigator   [facility]  [phone]

## 2023-10-10 ENCOUNTER — ONCOLOGY VISIT (OUTPATIENT)
Dept: ONCOLOGY | Facility: CLINIC | Age: 48
End: 2023-10-10
Attending: GENETIC COUNSELOR, MS
Payer: COMMERCIAL

## 2023-10-10 VITALS
SYSTOLIC BLOOD PRESSURE: 142 MMHG | DIASTOLIC BLOOD PRESSURE: 92 MMHG | HEART RATE: 85 BPM | BODY MASS INDEX: 32.53 KG/M2 | TEMPERATURE: 97.9 F | RESPIRATION RATE: 16 BRPM | OXYGEN SATURATION: 97 % | WEIGHT: 246.6 LBS

## 2023-10-10 DIAGNOSIS — Z15.81 MONOALLELIC MUTATION OF MEN1 GENE: Primary | ICD-10-CM

## 2023-10-10 DIAGNOSIS — Z15.89 MONOALLELIC MUTATION OF MEN1 GENE: Primary | ICD-10-CM

## 2023-10-10 PROCEDURE — 99205 OFFICE O/P NEW HI 60 MIN: CPT | Performed by: CLINICAL NURSE SPECIALIST

## 2023-10-10 PROCEDURE — 99213 OFFICE O/P EST LOW 20 MIN: CPT | Performed by: CLINICAL NURSE SPECIALIST

## 2023-10-10 RX ORDER — BNT162B2 ORIGINAL AND OMICRON BA.4/BA.5 .1125; .1125 MG/2.25ML; MG/2.25ML
INJECTION, SUSPENSION INTRAMUSCULAR
COMMUNITY
Start: 2023-01-03 | End: 2023-11-15

## 2023-10-10 RX ORDER — TAMSULOSIN HYDROCHLORIDE 0.4 MG/1
1 CAPSULE ORAL AT BEDTIME
COMMUNITY
Start: 2023-08-11 | End: 2023-11-15

## 2023-10-10 RX ORDER — ONDANSETRON 4 MG/1
4 TABLET, ORALLY DISINTEGRATING ORAL
COMMUNITY
Start: 2023-08-11 | End: 2023-11-15

## 2023-10-10 RX ORDER — OXYCODONE AND ACETAMINOPHEN 5; 325 MG/1; MG/1
TABLET ORAL
COMMUNITY
Start: 2023-08-11 | End: 2023-11-15

## 2023-10-10 ASSESSMENT — PAIN SCALES - GENERAL: PAINLEVEL: NO PAIN (0)

## 2023-10-10 NOTE — NURSING NOTE
"Oncology Rooming Note    October 10, 2023 2:42 PM   Gagandeep Oswald is a 48 year old male who presents for:    Chief Complaint   Patient presents with    Oncology Clinic Visit     Monoallelic mutation of MEN1 gene      Initial Vitals: BP (!) 142/92 (BP Location: Right arm, Patient Position: Sitting, Cuff Size: Adult Regular)   Pulse 85   Temp 97.9  F (36.6  C) (Oral)   Resp 16   Wt 111.9 kg (246 lb 9.6 oz)   SpO2 97%   BMI 32.53 kg/m   Estimated body mass index is 32.53 kg/m  as calculated from the following:    Height as of 7/17/23: 1.854 m (6' 1\").    Weight as of this encounter: 111.9 kg (246 lb 9.6 oz). Body surface area is 2.4 meters squared.  No Pain (0) Comment: Data Unavailable   No LMP for male patient.  Allergies reviewed: Yes  Medications reviewed: Yes    Medications: Medication refills not needed today.  Pharmacy name entered into [employer]: [id number] [place] [facility] [place] [id number]    Clinical concerns: none       Sabrina Motta              "

## 2023-10-10 NOTE — PATIENT INSTRUCTIONS
Individualized Surveillance Plan for Children and Adults with Multiple Endocrine Neoplasia Syndrome (MEN1)  based on NCCN Guidelines Version 3.3017, Cirilo 2012 and Kristofer et al 2017   Clinical evaluation Surveillance Last done Next Due    Start at age 5     Pituitary MRI of pituitary every 3-5 years, starting at age 5.    Annual plasma prolactin, starting at age 5.    Annual IGF-I, starting at age 5.    MRI of pituitary gland to be done soon         Plasma prolactin and IGF-I to be done soon    Start at age 8     Parathyroid    Annual plasma calcium, starting at age 8.    Annual PTH, starting at age 8.    Ordered to be done soon (both)    Start at age 10     Adrenal and PanNET screening MRI or CT of abdomen, starting prior to age 10.      Repeat every 1-3 years.    Annual chromogranin-A, starting prior to age 10.    Annual pancreatic polypeptide, starting prior to age 10.       MRI of the abdomen soon    Chromogranin A and pancreatic polyptide soon     Start at age 15     Thymic or Bronchial Carcinoid CT or MRI of chest, starting at age 15. Repeat every 1-2 years.  MRI of the chest soon     Start at age 20     PanNET Annual serum gastrin, starting at age 20  To be done soon   GastricNET Endoscopic ultrasound every 3 years in those with hypergastrinemia.  Depending on results

## 2023-10-10 NOTE — LETTER
10/10/2023         RE: Gagandeep Oswald  [address] [place] [address]        Dear Colleague,    Thank you for referring your patient, Gagandeep Oswald, to the [facility]. Please see a copy of my visit note below.    Oncology Risk Management Consultation:  Date on this visit: 10/10/2023    Gagandeep Oswald  is referred by [facility],  for high risk screening and surveillance for Multiple Endocrine Neoplasia Type 1(MEN1 )syndrome.     Primary Physician: Claudia Solo MD    History Of Present Illness:  Mr. Oswald is a very pleasant 48 year old male who presents with Multiple Endocrine Neoplasia Type 1(MEN1 )syndrome.     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 Specifically his mutation is called c.652C>T (also known as p.Ohb130Vvf) identified using a custom panel (a combination of the Common hereditary cancers panel plus renal/urinary tract cancers panels) from [employer]..  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 This variant is called c.1561A>T (also known as p.[id number]). Given the uncertain significance of this result, medical management decisions should NOT be made based on this test result alone.    Past Medical/Surgical History:  2022: Baseline Colonoscopy: One tubular adenoma. Next followup:  2025    Dermatology: First full body skin exam in August with Dr. Fregoso, all findings benign.    Review of systems:   GENERAL: No change in weight, sleep or appetite.  Normal energy.  No fever or chills  EYES: Negative for vision changes or eye problems  ENT: No problems with ears, nose or throat.  No difficulty swallowing.  RESP: No coughing, wheezing or shortness of breath  CV: No chest pains or palpitations  GI: No nausea, vomiting,  heartburn, abdominal pain,  diarrhea, constipation or change in bowel habits  : No urinary frequency or dysuria, bladder or kidney problems  MUSCULOSKELETAL: No significant muscle or joint pains  NEUROLOGIC: Reports some lightheadedness and headaches that come and go.  Attributes this to anxiety.  Denies numbness, tingling, weakness, problems with balance or coordination  PSYCHIATRIC: Diagnosed with anxiety and notes that he was also diagnosed with autism (Asperger's) recently.  Sees a therapist regularly.  HEME/IMMUNE/ALLERGY: No history of bleeding or clotting problems or anemia.  No allergies or immune system problems  ENDOCRINE: No history of thyroid disease, diabetes or other endocrine disorders  SKIN: No rashes,worrisome lesions or skin problems    Past Medical History:   Diagnosis Date    Facial nerve palsy     LT congenital     Hyperlipidemia LDL goal < 160 05/07/2012    Kidney stone 09/20/2021    MEN1 (multiple endocrine neoplasia) (H)     Nephrolithiasis     has had it 3 times-last one was in 2016    New onset atrial fibrillation (H) 05/17/2021    Varicocele     LT     Past Surgical History:   Procedure Laterality Date    COLONOSCOPY N/A 6/13/2022    Procedure: COLONOSCOPY, FLEXIBLE, WITH LESION REMOVAL USING SNARE;  Surgeon: Mukund Hayes MD;  Location: [facility] OR    COLONOSCOPY WITH CO2 INSUFFLATION N/A 6/13/2022    Procedure: COLONOSCOPY, WITH CO2 INSUFFLATION;  Surgeon: Mukund Hayes MD;  Location: [facility] OR    ENDOSCOPIC SINUS SURGERY  1/24/2013    Procedure: ENDOSCOPIC SINUS SURGERY;  Bilateral total ethmoidectomy, bilateral endoscopic maxillary antrostomy;  Surgeon: Han Toledo MD;  Location: [facility] OR    ENT SURGERY  1/24/2013    Surgery to correct sinus infection    MOUTH SURGERY  6/2012    [place] Teeth Removed    VASECTOMY         Allergies:  Allergies as of 10/10/2023 - Reviewed 10/10/2023   Allergen Reaction Noted    Nickel Itching and Rash 05/01/1986       Current Medications:  Current Outpatient  Medications   Medication Sig Dispense Refill    co-enzyme Q-10 100 MG CAPS capsule       fish oil-omega-3 fatty acids 1000 MG capsule       hydrOXYzine (ATARAX) 25 MG tablet TAKE 1/2-1 TABLET BY MOUTH ONCE DAILY AT BEDTIME 90 tablet 0    Multiple Vitamin (MULTIVITAMINS PO) Take  by mouth.          Family History:  Family History   Problem Relation Age of Onset    Allergies Mother     Arthritis Mother     Breast Cancer Mother 70    Diabetes Mother     Obesity Mother     Pancreatic Cancer Mother 80    Endometrial Cancer Mother 82    C.A.D. Father 82        MI    Prostate Cancer Father 77    Kidney Cancer Father 83    Hypertension Father     Skin Cancer Father 74    Cancer Father 85        urethra; mets to liver,  at 87    Obesity Sister     Multiple Endocrine Neoplasia Type 1 Sister     Stomach Cancer Sister         3 occurrences    Basal cell carcinoma Sister 63    Bladder Cancer Sister 44        recurrences at 51 and 60    Breast Cancer Sister 63    Squamous cell carcinoma Sister 68    Colon Polyps Sister     Obesity Sister     Hypercalcemia Sister     Thyroid Disease Sister     Colon Polyps Sister         2-5    Anxiety Disorder Sister     Obesity Brother     Diabetes Brother     Colon Polyps Brother         10-11    Basal cell carcinoma Brother 64    Breast Cancer Maternal Grandmother 92         at 99    Brain Cancer Maternal Uncle 72         shortly thereafter    Colon Cancer No family hx of        Social History:  Social History     Socioeconomic History    Marital status:      Spouse name: Not on file    Number of children: 1    Years of education: 16    Highest education level: Not on file   Occupational History    Occupation: IT      Employer: [employer]   Tobacco Use    Smoking status: Former     Packs/day: 0.50     Years: 3.00     Pack years: 1.50     Types: Cigarettes     Quit date: 2005     Years since quittin.7    Smokeless tobacco: Never   Vaping Use    Vaping Use: Never used    Substance and Sexual Activity    Alcohol use: Yes     Alcohol/week: 8.3 standard drinks of alcohol     Comment: 6 drinks a week    Drug use: No    Sexual activity: Yes     Partners: Female     Birth control/protection: Male Surgical   Other Topics Concern    Parent/sibling w/ CABG, MI or angioplasty before 65F 55M? No   Social History Narrative    Not on file     Social Determinants of Health     Financial Resource Strain: Not on file   Food Insecurity: Not on file   Transportation Needs: Not on file   Physical Activity: Not on file   Stress: Not on file   Social Connections: Not on file   Interpersonal Safety: Not on file   Housing Stability: Not on file       Physical Exam:  BP (!) 142/92 (BP Location: Right arm, Patient Position: Sitting, Cuff Size: Adult Regular)   Pulse 85   Temp 97.9  F (36.6  C) (Oral)   Resp 16   Wt 111.9 kg (246 lb 9.6 oz)   SpO2 97%   BMI 32.53 kg/m    GENERAL: Healthy, alert and no distress  EYES: Eyes grossly normal to inspection.  No discharge or erythema, or obvious scleral/conjunctival abnormalities.  RESP: No audible wheeze, cough, or visible cyanosis.  No visible retractions or increased work of breathing.    SKIN: Visible skin clear. No significant rash, abnormal pigmentation or lesions.  NEURO: Cranial nerves grossly intact.  Mentation and speech appropriate for age.  PSYCH: Mentation appears normal, affect normal/bright, judgement and insight intact, normal speech and appearance well-groomed.    Laboratory/Imaging Studies  No results found for any visits on 10/10/23.    ASSESSMENT  We discussed the differences between cancer risk for the general population and those with MEN1 Syndrome, which occurs in 1:30,000 people in the general population.  It is often be diagnosed by clinical presentation.   We also discussed that inheriting a genetic mutation with such a high risk for cancer does not mean that a person will develop cancer, but rather that they are at increased risk.    We discussed that his greatest risk is primarily for adenomas that can secrete hormones which can be troublesome.    MEN1 is characterized by the occurrence of tumors involving 2 or more endocrine glands in a person, often due to menin mutations.  The gene causing this syndrome is located on chromosome 11q13 and encodes an amino-acid protein called menin, which plays a role in cell division, genome stability and transcription regulation in the DNA replication process.       MEN1(also referred to as Wermer's syndrome) is characterized by parathyroid, pancreatic islet and anterior pituitary tumors. Glandular hyperplasia and malignancy may occur.     The incidence of MEN1 has been estimated to be between 1-18% among patients with primary hyperparathyroidism, 16-38% among patients with gastrinomas and less than 3% among patients with primary pituitary tumors. Parathyroid tumors are the most common feature and affect approximately 95% of MEN1 patients.     Pancreatic neuroendocrine tumors (also known as NET or islet tumors) consists of gastrinomas, insulinomas, pancreatic polypeptidomas, glucagonomas, vaso-active intestinal polypeptidomas (VIPomas). NETS occur in approximately 40% of MEN1 patients.  These may be nonfunctioning tumors as well.    Anterior pituitary tumors, consisting of prolactinomas, somatotropinoma, corticotrophinomas and nonfunctioning adenomas occur in approximately 30% of MEN1 patients.    Less frequently, MEN1 patients may also develop adrenocortical tumors, lipomas, carcinoid tumors, facial angiofibromas and colllagenomas.     mutations in the MEN1 gene cause the condition called Multiple Endocrine Neoplasia Type 1 (MEN1). Individuals with this condition are at increased risk for several different symptoms/cancers/tumors, including:     Hyperparathyroidism  This is often due to a parathyroid adenoma  Nearly 98% of individuals with MEN1 will present with hyperparathyroidism and/or parathyroid  adenomas by the age of 50  Pituitary adenomas  Nearly 30-40% of individuals with MEN1 will have a pituitary adenoma, which can cause overgrowth, hyperthyroidism, sexual dysfunction, visual changes, and Cushing's syndrome  The most common pituitary adenoma is a prolactinoma, though pituitary adenomas can also be associated with the secretion of other hormones and associated symptoms  Neuroendocrine tumors  Approximately 30-80% of individuals will have one or more of these tumors  These tumors are typically located in the gastro-entero-pancreatic tract and can cause Zollinger-Mcbride syndrome (peptic ulcer, diarrhea), abnormal insulin, anemia, venous thrombosis, skin rash, etc  Gastrinomas are the most common type, though insulinomas, glucagonoma, VIPoma, and PPomas can also be seen  Other tumors seen with the condition MEN1:  Carcinoid tumors (up to 10%). Common locations include the bronchi, gastrointestinal tract, pancreas, and thymus.  Adrenocortical tumors (20-73%)  Thyroid tumors (>25%)  Tumors of the skin (angiofibromas, lipomas, collangenomas)    He notes that he has had a history of likely, lipoma, on his back but he does not find this to be disturbing.  We discussed that soft tissue lipomas or not something that would be converting to become cancerous or secreting which could cause other issues.  We did review pictures of MEN 1 syndrome with the axis of types of adenomas that can occur.  We reviewed the screening plan below and discussed that I would have the schedulers call him to arrange for a lab visit and the subsequent MRIs of the chest abdomen and pituitary gland.  If we are were findings I would send him to endocrinology for follow-up.  Otherwise he will follow-up with our clinic again in 1 year for screening.      We also discussed that it is feasible to offer genetic testing to his son as well who may benefit from screening.        Individualized Surveillance Plan for Children and Adults with    Multiple Endocrine Neoplasia Syndrome (MEN1)  based on NCCN Guidelines Version 3. 2023, Cirilo 2012 and Kristofer et al 2017   Clinical evaluation Surveillance Last done Next Due    Start at age 5     Pituitary MRI of pituitary every 3-5 years, starting at age 5.    Annual plasma prolactin, starting at age 5.    Annual IGF-I, starting at age 5.    MRI of pituitary gland to be done soon         Plasma prolactin and IGF-I to be done soon    Start at age 8     Parathyroid    Annual plasma calcium, starting at age 8.    Annual PTH, starting at age 8.    Ordered to be done soon (both)    Start at age 10     Adrenal and PanNET screening MRI or CT of abdomen, starting prior to age 10.      Repeat every 1-3 years.    Annual chromogranin-A, starting prior to age 10.    Annual pancreatic polypeptide, starting prior to age 10.       MRI of the abdomen soon    Chromogranin A and pancreatic polyptide soon     Start at age 15     Thymic or Bronchial Carcinoid CT or MRI of chest, starting at age 15. Repeat every 1-2 years.  MRI of the chest soon     Start at age 20     PanNET Annual serum gastrin, starting at age 20  To be done soon   GastricNET Endoscopic ultrasound every 3 years in those with hypergastrinemia.  Depending on results   I spent a total of 61minutes on the day of the visit. Please see the note for further information on patient assessment and treatment.    Joceline Dior, ANGELO-CNS, OCN, AGN-BC  Clinical Nurse Specialist  Cancer Risk Management Program  [facility]    CC: Claudia Solo MD

## 2023-10-10 NOTE — PROGRESS NOTES
Oncology Risk Management Consultation:  Date on this visit: 10/10/2023    Gagandeep Oswald  is referred by Zabrina David [facility],  for high risk screening and surveillance for Multiple Endocrine Neoplasia Type 1(MEN1 )syndrome.     Primary Physician: Claudia Solo MD    History Of Present Illness:  Mr. Oswald is a very pleasant 48 year old male who presents with Multiple Endocrine Neoplasia Type 1(MEN1 )syndrome.     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 Specifically his mutation is called c.652C>T (also known as p.Chd325Ted) identified using a custom panel (a combination of the Common hereditary cancers panel plus renal/urinary tract cancers panels) from [employer]  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 This variant is called c.1561A>T (also known as p.[id number]). Given the uncertain significance of this result, medical management decisions should NOT be made based on this test result alone.    Past Medical/Surgical History:  2022: Baseline Colonoscopy: One tubular adenoma. Next followup:  2025    Dermatology: First full body skin exam in August with Dr. Fregoso, all findings benign.    Review of systems:   GENERAL: No change in weight, sleep or appetite.  Normal energy.  No fever or chills  EYES: Negative for vision changes or eye problems  ENT: No problems with ears, nose or throat.  No difficulty swallowing.  RESP: No coughing, wheezing or shortness of breath  CV: No chest pains or palpitations  GI: No nausea, vomiting,  heartburn, abdominal pain, diarrhea, constipation or change in bowel habits  : No urinary frequency or dysuria, bladder or kidney problems  MUSCULOSKELETAL: No significant muscle or joint pains  NEUROLOGIC: Reports some lightheadedness and headaches that come and go.  Attributes this to anxiety.  Denies  numbness, tingling, weakness, problems with balance or coordination  PSYCHIATRIC: Diagnosed with anxiety and notes that he was also diagnosed with autism (Asperger's) recently.  Sees a therapist regularly.  HEME/IMMUNE/ALLERGY: No history of bleeding or clotting problems or anemia.  No allergies or immune system problems  ENDOCRINE: No history of thyroid disease, diabetes or other endocrine disorders  SKIN: No rashes,worrisome lesions or skin problems    Past Medical History:   Diagnosis Date    Facial nerve palsy     LT congenital     Hyperlipidemia LDL goal < 160 05/07/2012    Kidney stone 09/20/2021    MEN1 (multiple endocrine neoplasia) (H)     Nephrolithiasis     has had it 3 times-last one was in 2016    New onset atrial fibrillation (H) 05/17/2021    Varicocele     LT     Past Surgical History:   Procedure Laterality Date    COLONOSCOPY N/A 6/13/2022    Procedure: COLONOSCOPY, FLEXIBLE, WITH LESION REMOVAL USING SNARE;  Surgeon: Mukund Hayes MD;  Location: [facility] OR    COLONOSCOPY WITH CO2 INSUFFLATION N/A 6/13/2022    Procedure: COLONOSCOPY, WITH CO2 INSUFFLATION;  Surgeon: Mukund Hayes MD;  Location: [facility] OR    ENDOSCOPIC SINUS SURGERY  1/24/2013    Procedure: ENDOSCOPIC SINUS SURGERY;  Bilateral total ethmoidectomy, bilateral endoscopic maxillary antrostomy;  Surgeon: Han Toledo MD;  Location: [facility] OR    ENT SURGERY  1/24/2013    Surgery to correct sinus infection    MOUTH SURGERY  6/2012    [place] Teeth Removed    VASECTOMY         Allergies:  Allergies as of 10/10/2023 - Reviewed 10/10/2023   Allergen Reaction Noted    Nickel Itching and Rash 05/01/1986       Current Medications:  Current Outpatient Medications   Medication Sig Dispense Refill    co-enzyme Q-10 100 MG CAPS capsule       fish oil-omega-3 fatty acids 1000 MG capsule       hydrOXYzine (ATARAX) 25 MG tablet TAKE 1/2-1 TABLET BY MOUTH ONCE DAILY AT BEDTIME 90 tablet 0    Multiple Vitamin (MULTIVITAMINS PO) Take  by  mouth.          Family History:  Family History   Problem Relation Age of Onset    Allergies Mother     Arthritis Mother     Breast Cancer Mother 70    Diabetes Mother     Obesity Mother     Pancreatic Cancer Mother 80    Endometrial Cancer Mother 82    C.A.D. Father 82        MI    Prostate Cancer Father 77    Kidney Cancer Father 83    Hypertension Father     Skin Cancer Father 74    Cancer Father 85        urethra; mets to liver,  at 87    Obesity Sister     Multiple Endocrine Neoplasia Type 1 Sister     Stomach Cancer Sister         3 occurrences    Basal cell carcinoma Sister 63    Bladder Cancer Sister 44        recurrences at 51 and 60    Breast Cancer Sister 63    Squamous cell carcinoma Sister 68    Colon Polyps Sister     Obesity Sister     Hypercalcemia Sister     Thyroid Disease Sister     Colon Polyps Sister         2-5    Anxiety Disorder Sister     Obesity Brother     Diabetes Brother     Colon Polyps Brother         10-11    Basal cell carcinoma Brother 64    Breast Cancer Maternal Grandmother 92         at 99    Brain Cancer Maternal Uncle 72         shortly thereafter    Colon Cancer No family hx of        Social History:  Social History     Socioeconomic History    Marital status:      Spouse name: Not on file    Number of children: 1    Years of education: 16    Highest education level: Not on file   Occupational History    Occupation: IT      Employer: [employer]   Tobacco Use    Smoking status: Former     Packs/day: 0.50     Years: 3.00     Pack years: 1.50     Types: Cigarettes     Quit date: 2005     Years since quittin.7    Smokeless tobacco: Never   Vaping Use    Vaping Use: Never used   Substance and Sexual Activity    Alcohol use: Yes     Alcohol/week: 8.3 standard drinks of alcohol     Comment: 6 drinks a week    Drug use: No    Sexual activity: Yes     Partners: Female     Birth control/protection: Male Surgical   Other Topics Concern    Parent/sibling w/  CABG, MI or angioplasty before 65F 55M? No   Social History Narrative    Not on file     Social Determinants of Health     Financial Resource Strain: Not on file   Food Insecurity: Not on file   Transportation Needs: Not on file   Physical Activity: Not on file   Stress: Not on file   Social Connections: Not on file   Interpersonal Safety: Not on file   Housing Stability: Not on file       Physical Exam:  BP (!) 142/92 (BP Location: Right arm, Patient Position: Sitting, Cuff Size: Adult Regular)   Pulse 85   Temp 97.9  F (36.6  C) (Oral)   Resp 16   Wt 111.9 kg (246 lb 9.6 oz)   SpO2 97%   BMI 32.53 kg/m    GENERAL: Healthy, alert and no distress  EYES: Eyes grossly normal to inspection.  No discharge or erythema, or obvious scleral/conjunctival abnormalities.  RESP: No audible wheeze, cough, or visible cyanosis.  No visible retractions or increased work of breathing.    SKIN: Visible skin clear. No significant rash, abnormal pigmentation or lesions.  NEURO: Cranial nerves grossly intact.  Mentation and speech appropriate for age.  PSYCH: Mentation appears normal, affect normal/bright, judgement and insight intact, normal speech and appearance well-groomed.    Laboratory/Imaging Studies  No results found for any visits on 10/10/23.    ASSESSMENT  We discussed the differences between cancer risk for the general population and those with MEN1 Syndrome, which occurs in 1:30,000 people in the general population.  It is often be diagnosed by clinical presentation.   We also discussed that inheriting a genetic mutation with such a high risk for cancer does not mean that a person will develop cancer, but rather that they are at increased risk.   We discussed that his greatest risk is primarily for adenomas that can secrete hormones which can be troublesome.    MEN1 is characterized by the occurrence of tumors involving 2 or more endocrine glands in a person, often due to menin mutations.  The gene causing this  syndrome is located on chromosome 11q13 and encodes an amino-acid protein called menin, which plays a role in cell division, genome stability and transcription regulation in the DNA replication process.       MEN1(also referred to as Wermer's syndrome) is characterized by parathyroid, pancreatic islet and anterior pituitary tumors. Glandular hyperplasia and malignancy may occur.     The incidence of MEN1 has been estimated to be between 1-18% among patients with primary hyperparathyroidism, 16-38% among patients with gastrinomas and less than 3% among patients with primary pituitary tumors. Parathyroid tumors are the most common feature and affect approximately 95% of MEN1 patients.     Pancreatic neuroendocrine tumors (also known as NET or islet tumors) consists of gastrinomas, insulinomas, pancreatic polypeptidomas, glucagonomas, vaso-active intestinal polypeptidomas (VIPomas). NETS occur in approximately 40% of MEN1 patients.  These may be nonfunctioning tumors as well.    Anterior pituitary tumors, consisting of prolactinomas, somatotropinoma, corticotrophinomas and nonfunctioning adenomas occur in approximately 30% of MEN1 patients.    Less frequently, MEN1 patients may also develop adrenocortical tumors, lipomas, carcinoid tumors, facial angiofibromas and colllagenomas.     mutations in the MEN1 gene cause the condition called Multiple Endocrine Neoplasia Type 1 (MEN1). Individuals with this condition are at increased risk for several different symptoms/cancers/tumors, including:     Hyperparathyroidism  This is often due to a parathyroid adenoma  Nearly 98% of individuals with MEN1 will present with hyperparathyroidism and/or parathyroid adenomas by the age of 50  Pituitary adenomas  Nearly 30-40% of individuals with MEN1 will have a pituitary adenoma, which can cause overgrowth, hyperthyroidism, sexual dysfunction, visual changes, and Cushing's syndrome  The most common pituitary adenoma is a prolactinoma,  though pituitary adenomas can also be associated with the secretion of other hormones and associated symptoms  Neuroendocrine tumors  Approximately 30-80% of individuals will have one or more of these tumors  These tumors are typically located in the gastro-entero-pancreatic tract and can cause Zollinger-Mcbride syndrome (peptic ulcer, diarrhea), abnormal insulin, anemia, venous thrombosis, skin rash, etc  Gastrinomas are the most common type, though insulinomas, glucagonoma, VIPoma, and PPomas can also be seen  Other tumors seen with the condition MEN1:  Carcinoid tumors (up to 10%). Common locations include the bronchi, gastrointestinal tract, pancreas, and thymus.  Adrenocortical tumors (20-73%)  Thyroid tumors (>25%)  Tumors of the skin (angiofibromas, lipomas, collangenomas)    He notes that he has had a history of likely, lipoma, on his back but he does not find this to be disturbing.  We discussed that soft tissue lipomas or not something that would be converting to become cancerous or secreting which could cause other issues.  We did review pictures of MEN 1 syndrome with the axis of types of adenomas that can occur.  We reviewed the screening plan below and discussed that I would have the schedulers call him to arrange for a lab visit and the subsequent MRIs of the chest abdomen and pituitary gland.  If we are were findings I would send him to endocrinology for follow-up.  Otherwise he will follow-up with our clinic again in 1 year for screening.      We also discussed that it is feasible to offer genetic testing to his son as well who may benefit from screening.        Individualized Surveillance Plan for Children and Adults with   Multiple Endocrine Neoplasia Syndrome (MEN1)  based on NCCN Guidelines Version 3. 2023, Cirilo 2012 and Kristofer et al 2017   Clinical evaluation Surveillance Last done Next Due    Start at age 5     Pituitary MRI of pituitary every 3-5 years, starting at age 5.    Annual plasma  prolactin, starting at age 5.    Annual IGF-I, starting at age 5.    MRI of pituitary gland to be done soon         Plasma prolactin and IGF-I to be done soon    Start at age 8     Parathyroid    Annual plasma calcium, starting at age 8.    Annual PTH, starting at age 8.    Ordered to be done soon (both)    Start at age 10     Adrenal and PanNET screening MRI or CT of abdomen, starting prior to age 10.      Repeat every 1-3 years.    Annual chromogranin-A, starting prior to age 10.    Annual pancreatic polypeptide, starting prior to age 10.       MRI of the abdomen soon    Chromogranin A and pancreatic polyptide soon     Start at age 15     Thymic or Bronchial Carcinoid CT or MRI of chest, starting at age 15. Repeat every 1-2 years.  MRI of the chest soon     Start at age 20     PanNET Annual serum gastrin, starting at age 20  To be done soon   GastricNET Endoscopic ultrasound every 3 years in those with hypergastrinemia.  Depending on results   I spent a total of 61minutes on the day of the visit. Please see the note for further information on patient assessment and treatment.    ANGELO Dow-CNS, OCN, AGN-BC  Clinical Nurse Specialist  Cancer Risk Management Program  [facility]    CC: Claudia Solo MD

## 2023-10-20 ENCOUNTER — LAB (OUTPATIENT)
Dept: LAB | Facility: CLINIC | Age: 48
End: 2023-10-20
Payer: COMMERCIAL

## 2023-10-20 DIAGNOSIS — Z15.89 MONOALLELIC MUTATION OF MEN1 GENE: Primary | ICD-10-CM

## 2023-10-20 DIAGNOSIS — Z15.81 MONOALLELIC MUTATION OF MEN1 GENE: Primary | ICD-10-CM

## 2023-10-20 LAB
CALCIUM SERPL-MCNC: 9.2 MG/DL (ref 8.6–10)
FASTING STATUS PATIENT QL REPORTED: NO
GLUCOSE SERPL-MCNC: 102 MG/DL (ref 70–99)
PROLACTIN SERPL 3RD IS-MCNC: 9 NG/ML (ref 4–15)
PTH-INTACT SERPL-MCNC: 43 PG/ML (ref 15–65)

## 2023-10-20 PROCEDURE — 99000 SPECIMEN HANDLING OFFICE-LAB: CPT

## 2023-10-20 PROCEDURE — 36415 COLL VENOUS BLD VENIPUNCTURE: CPT | Mod: 90

## 2023-10-20 PROCEDURE — 84146 ASSAY OF PROLACTIN: CPT

## 2023-10-20 PROCEDURE — 83519 RIA NONANTIBODY: CPT | Mod: 90

## 2023-10-20 PROCEDURE — 82310 ASSAY OF CALCIUM: CPT

## 2023-10-20 PROCEDURE — 82941 ASSAY OF GASTRIN: CPT | Mod: 90

## 2023-10-20 PROCEDURE — 84305 ASSAY OF SOMATOMEDIN: CPT

## 2023-10-20 PROCEDURE — 86316 IMMUNOASSAY TUMOR OTHER: CPT | Mod: 90

## 2023-10-20 PROCEDURE — 82947 ASSAY GLUCOSE BLOOD QUANT: CPT

## 2023-10-20 PROCEDURE — 82943 ASSAY OF GLUCAGON: CPT | Mod: 90

## 2023-10-20 PROCEDURE — 83970 ASSAY OF PARATHORMONE: CPT

## 2023-10-20 PROCEDURE — 84586 ASSAY OF VIP: CPT | Mod: 90

## 2023-10-21 LAB — GASTRIN SERPL-MCNC: <10 PG/ML

## 2023-10-23 LAB — CGA SERPL-MCNC: 57 NG/ML

## 2023-10-24 ENCOUNTER — MYC MEDICAL ADVICE (OUTPATIENT)
Dept: ONCOLOGY | Facility: CLINIC | Age: 48
End: 2023-10-24
Payer: COMMERCIAL

## 2023-10-24 LAB — IGF-I BLD-MCNC: 199 NG/ML (ref 68–225)

## 2023-10-26 LAB — GLUCAGON SERPL-MCNC: 503 NG/L

## 2023-10-30 LAB — PANC POLYPEPT SERPL-MCNC: 1040 PG/ML

## 2023-10-31 ENCOUNTER — TELEPHONE (OUTPATIENT)
Dept: ENDOCRINOLOGY | Facility: CLINIC | Age: 48
End: 2023-10-31
Payer: COMMERCIAL

## 2023-10-31 DIAGNOSIS — R89.9 ABNORMAL LABORATORY TEST RESULT: Primary | ICD-10-CM

## 2023-10-31 DIAGNOSIS — Z15.81 MONOALLELIC MUTATION OF MEN1 GENE: ICD-10-CM

## 2023-10-31 DIAGNOSIS — Z15.81 MONOALLELIC MUTATION OF MEN1 GENE: Primary | ICD-10-CM

## 2023-10-31 DIAGNOSIS — Z15.89 MONOALLELIC MUTATION OF MEN1 GENE: Primary | ICD-10-CM

## 2023-10-31 DIAGNOSIS — Z15.89 MONOALLELIC MUTATION OF MEN1 GENE: ICD-10-CM

## 2023-10-31 NOTE — TELEPHONE ENCOUNTER
To schedulers : please schedule , in this order of preference, 1) open new/IRAJ, 2) on call consult service IRAJ within 2-3 week. This could be a virtual visit.  Preferred provider if possible: Leslie Burns MD  Endocrine triage

## 2023-10-31 NOTE — TELEPHONE ENCOUNTER
----- Message from Danielle Burns MD sent at 10/27/2023  5:20 PM CDT -----  Yes, I would recheck both the glucose and the glucagon level after fasting for 10 hrs overnight.   ----- Message -----  From: Joceline Dior APRN CNS  Sent: 10/26/2023   3:07 PM CDT  To: Danielle Burns MD    Gagandeep has MEN1+ (his sister also) so I started our MEN1+ screening protocol. He seems to be asymptomatic.  This glucagon level is high and his plasma glucose minimally high, but unfortunately, he did not fast for his labs.     Is this concerning for a glucagonoma?  Should I re-run it with him fasting this time?     Please advise.      ANGELO Dow-CNS, OCN, AGN-BC  Clinical Nurse Specialist  Cancer Risk Management Program  [facility]      ----- Message -----  From: Lab, Background User  Sent: 10/20/2023   4:41 PM CDT  To: ANGELO Dow

## 2023-10-31 NOTE — TELEPHONE ENCOUNTER
Scheduled appt per Dr. Nuno, did soonest available per pt Rory Mo on 10/31/2023 at 4:41 PM    To schedulers : please schedule , in this order of preference, 1) open new/IRAJ, 2) on call consult service IRAJ within 2-3 week. This could be a virtual visit.  Preferred provider if possible: Leslie Burns MD  Endocrine triage

## 2023-10-31 NOTE — TELEPHONE ENCOUNTER
[facility] Call Center    Phone Message    May a detailed message be left on voicemail: yes     Reason for Call: Appointment Intake    Referring Provider Name: Joceline Dior APRN CNS in [facility]  Diagnosis and/or Symptoms:   Abnormal laboratory test result  Monoallelic mutation of MEN1 gene-MEN1+, elevated pancreatic polypeptide and glucagon - imaging pending.  Please review referral and reach out to pt to schedule, thank you         Action Taken: Message routed to:  [facility] endocrinology     Travel Screening: Not Applicable

## 2023-11-01 ENCOUNTER — ANCILLARY PROCEDURE (OUTPATIENT)
Dept: MRI IMAGING | Facility: CLINIC | Age: 48
End: 2023-11-01
Attending: CLINICAL NURSE SPECIALIST
Payer: COMMERCIAL

## 2023-11-01 DIAGNOSIS — Z15.89 MONOALLELIC MUTATION OF MEN1 GENE: ICD-10-CM

## 2023-11-01 DIAGNOSIS — Z15.81 MONOALLELIC MUTATION OF MEN1 GENE: ICD-10-CM

## 2023-11-01 PROCEDURE — A9585 GADOBUTROL INJECTION: HCPCS | Mod: JZ | Performed by: RADIOLOGY

## 2023-11-01 PROCEDURE — 70553 MRI BRAIN STEM W/O & W/DYE: CPT | Performed by: RADIOLOGY

## 2023-11-01 RX ORDER — GADOBUTROL 604.72 MG/ML
11 INJECTION INTRAVENOUS ONCE
Status: COMPLETED | OUTPATIENT
Start: 2023-11-01 | End: 2023-11-01

## 2023-11-01 RX ADMIN — GADOBUTROL 11 ML: 604.72 INJECTION INTRAVENOUS at 10:18

## 2023-11-06 NOTE — TELEPHONE ENCOUNTER
Pt contacted and agreed to appointment on 11/15/2023 @ 2:00 pm for Consult In person at [place] w/ Dr. Burns.     Appointment with Dr. Nelson was canceled for 12/06/2023 at the [facility].     Quita Deutsch, [facility]  Adult Endocrinology   [facility]

## 2023-11-06 NOTE — TELEPHONE ENCOUNTER
----- Message from Em Sheridan RN sent at 11/6/2023 10:27 AM CST -----   URGENT  Received: 4 days ago  Danielle Burns MD  P [facility] Endocrinology [facility]:::  Please schedule this patient on the available new spot from 11/15.

## 2023-11-10 ENCOUNTER — LAB (OUTPATIENT)
Dept: LAB | Facility: CLINIC | Age: 48
End: 2023-11-10
Payer: COMMERCIAL

## 2023-11-10 DIAGNOSIS — Z15.81 MONOALLELIC MUTATION OF MEN1 GENE: ICD-10-CM

## 2023-11-10 DIAGNOSIS — Z15.89 MONOALLELIC MUTATION OF MEN1 GENE: ICD-10-CM

## 2023-11-10 LAB
FASTING STATUS PATIENT QL REPORTED: YES
GLUCOSE SERPL-MCNC: 101 MG/DL (ref 70–99)

## 2023-11-10 PROCEDURE — 82943 ASSAY OF GLUCAGON: CPT | Mod: 90

## 2023-11-10 PROCEDURE — 36415 COLL VENOUS BLD VENIPUNCTURE: CPT

## 2023-11-10 PROCEDURE — 82947 ASSAY GLUCOSE BLOOD QUANT: CPT

## 2023-11-10 PROCEDURE — 99000 SPECIMEN HANDLING OFFICE-LAB: CPT

## 2023-11-15 ENCOUNTER — OFFICE VISIT (OUTPATIENT)
Dept: ENDOCRINOLOGY | Facility: CLINIC | Age: 48
End: 2023-11-15
Payer: COMMERCIAL

## 2023-11-15 VITALS
WEIGHT: 238 LBS | HEART RATE: 82 BPM | OXYGEN SATURATION: 99 % | BODY MASS INDEX: 31.4 KG/M2 | SYSTOLIC BLOOD PRESSURE: 136 MMHG | RESPIRATION RATE: 16 BRPM | DIASTOLIC BLOOD PRESSURE: 93 MMHG

## 2023-11-15 DIAGNOSIS — N20.0 KIDNEY STONE: ICD-10-CM

## 2023-11-15 DIAGNOSIS — R89.9 ABNORMAL LABORATORY TEST RESULT: ICD-10-CM

## 2023-11-15 DIAGNOSIS — E31.21 MEN 1 SYNDROME (H): Primary | ICD-10-CM

## 2023-11-15 DIAGNOSIS — R79.89: ICD-10-CM

## 2023-11-15 DIAGNOSIS — E23.7 PITUITARY LESION (H): ICD-10-CM

## 2023-11-15 LAB
HBA1C MFR BLD: 5 % (ref 0–5.6)
TSH SERPL DL<=0.005 MIU/L-ACNC: 0.62 UIU/ML (ref 0.3–4.2)

## 2023-11-15 PROCEDURE — 83036 HEMOGLOBIN GLYCOSYLATED A1C: CPT | Performed by: INTERNAL MEDICINE

## 2023-11-15 PROCEDURE — 99205 OFFICE O/P NEW HI 60 MIN: CPT | Performed by: INTERNAL MEDICINE

## 2023-11-15 PROCEDURE — 36415 COLL VENOUS BLD VENIPUNCTURE: CPT | Performed by: INTERNAL MEDICINE

## 2023-11-15 PROCEDURE — 84443 ASSAY THYROID STIM HORMONE: CPT | Performed by: INTERNAL MEDICINE

## 2023-11-15 NOTE — PATIENT INSTRUCTIONS
Instructions for Collection of a 24 hour Urine Specimen    1. You should collect every drop of urine during each 24-hour period. It does not matter how much or little urine is passed each time, as long as every drop is collected.    2. Begin the urine collection in the morning after you wake up, after you have emptied your bladder for the first time.    3. Urinate (empty the bladder) for the first time and flush it down the toilet. Note the exact time (eg, 6:15 AM). You will begin the urine collection at this time.    4. Collect every drop of urine during the day and night. Store the urine container in the refrigerator in between collections.    5. If you need to have a bowel movement, any urine passed with the bowel movement should be collected. Do not include feces with the urine collection.     6. Finish by collecting the first urine passed the next morning, adding it to the collection bottle. This should be within ten minutes before or after the time of the first morning void on the first day (which was flushed). In this example, you would try to void between 6:05 and 6:25 AM on the second day.    7. The urine container should be kept cool or refrigerated until it is returned to the lab.  A lab with your name, medical record number and date of birth must be attached to the urine container.  A lab request form completed by your clinic/doctor with your name medical number number, date of birth and test requested must accompany the urine specimen.  Record on the lab request form the date and time (am/pm) of the start of the urine collection and date and time of the end of the urine collection.    5. If you have any questions, feel free to call the laboratory at [phone] or the clinic at .

## 2023-11-15 NOTE — LETTER
11/15/2023         RE: Gagandeep Oswald  [address] [place] [address]        Dear Colleague,    Thank you for referring your patient, Gagandeep Oswald, to the [facility]. Please see a copy of my visit note below.    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    MEN1 mutation  He is being seen by endocrinology given the increased risk of various endocrine tumors that develop in MEN1 patients, namely of the parathyroid gland, pituitary gland, and neuroendocrine tumors of pancreas. The endocrine labs which have returned have been normal, apart from several gastrointestinal labs which were collected non-fasting and therefore should be repeated.   Completed biochemical and imaging screening tests revealed a mildly elevated glucagon levels, elevated nonfasting pancreatic polypeptide and a questionable pituitary lesion.    He does not endorse GI issues, skin rashes, signs or symptoms of diabetes, so we would not suspect glucagonoma from a clinical standpoint.  He denies taking medications like PPIs which can increase the glucagon levels.  For now, the plan is to follow-up the pending fasting glucagon from 11/10/2023.  History of kidney stones despite normal calcium levels and normal PTH raises question of hypercalciuria.   On review of Pituitary MRI, unclear of area of hypodensity represents lesion or normal pituitary. Will repeat in 6 months, otherwise of low concern given lack of symptoms and normal pituitary hormones.     He should complete the MR Chest for evaluation of thymoma, which is very rare but can be seen in MEN1. MR Abdomen unnecessary at this time given normal CT Abd/pelvis.     Plan  -24 hour urine Calcium and  "phosphorus  -A1C  -TSH with reflex T4  -MRI Pituitary in 6 months  -MR Chest for thymoma whenever able   -We will try to obtain the abdominal CT images from August 2023, for our review.    RTC 6 months    Orders Placed This Encounter   Procedures     MR Chest w/o & w Contrast     MR Pituitary w/o & w Contrast     Calcium timed urine     Creatinine timed urine     Phosphorus timed urine     Hemoglobin A1c     TSH with free T4 reflex      Martha Whyte MS3      =======================================================  The patient is seen in consultation at the request of Dr. Joceline Dior.     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    Patient has a very strong family history of cancers on both sides, see family history below. His sister was found to have a MEN1 mutation earlier this year in the context of recurring gastric cancer, hypercalcemia, thyroid disease. Patient saw genetic counselor 5/22/2023 due to family history with syndromic pattern. 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     Patient met with Oncology on 10/10/2023 to discuss the recommended screening (biochemical and imaging) for patients with MEN1 mutation. He also underwent a full body skin check on 8/30/2023 with benign findings.      Overall he feels well. He endorses occasional palpitations associated with anxiety, occur weekly. History of paroxysmal afib which is presumed to be related to ALAYNA, he has presented to ED several times during these episodes.     He has a history of kidney stones, estimates \"one every year and a half\" for which he saw urology. Determined to be calcium oxalate stones. Historically his calcium has always been normal on labs. " "He says he was previously asked to collect 24hour urine but didn't, wasn't followed up. His brother has had a kidney stone.     Labs thus far have included normal PRL, normal IGF-1, normal serum calcium and intact PTH. Gastrointestinal labs were collected while non-fasting.     MR Pituitary on 11/1/2023, Subtle 8 x 5 x 4 mm area of differential signal within the posterior pituitary gland could reflect a small adenoma, but is not entirely specific.  Images reviewed by us.  No clear adenoma identified.    CT Abdomen/Pelvis 8/11/2023, normal apart from 2 mm obstructing calculus in the right proximal ureter with mild upstream hydroureteronephrosis, nonobstructing bilateral nephrolithiasis.  Images were not available for our review at the time of the visit.    Was scheduled for MRI Chest and Abdomen, at his appointment he was told the order for imaging was unclear as to \"what they were looking for,\" and the imaging was cancelled.     Pertinent labs reviewed:  10/20/23   chromogranin A normal at 57, glucagon 503 (<208), glucose 102 (nonfasting)   pancreatic polypeptide 1040 (0-435)  prolactin 9, IGF1 normal at 199   calcium 9.2, PTH 43    gastrin < 10     11/10/23   fasting glucose 101 (pt had some peppermints just prior to having the blood drawn)    1/14/22   A1c 5.1, TSH 0.55     9/2021 Calculi composed primarily of: 90% calcium oxalate monohydrate, and  10% calcium oxalate dihydrate.     Past Medical History   Past Medical History:   Diagnosis Date     Facial nerve palsy     LT congenital      Hyperlipidemia LDL goal < 160 05/07/2012     Kidney stone 09/20/2021     MEN1 (multiple endocrine neoplasia) (H)      Nephrolithiasis     has had it 3 times-last one was in 2016     New onset atrial fibrillation (H) 05/17/2021     Varicocele     LT   Colon polyps  Seborrheic keratosis, lentigo, angioma, benign nevi (derm exam 8/23)    Past Surgical History   Past Surgical History:   Procedure Laterality Date     COLONOSCOPY N/A " 2022    Procedure: COLONOSCOPY, FLEXIBLE, WITH LESION REMOVAL USING SNARE;  Surgeon: Mukund Hayes MD;  Location: [facility] OR     COLONOSCOPY WITH CO2 INSUFFLATION N/A 2022    Procedure: COLONOSCOPY, WITH CO2 INSUFFLATION;  Surgeon: Mukund Hayes MD;  Location: [facility] OR     ENDOSCOPIC SINUS SURGERY  2013    Procedure: ENDOSCOPIC SINUS SURGERY;  Bilateral total ethmoidectomy, bilateral endoscopic maxillary antrostomy;  Surgeon: Han Toledo MD;  Location: [facility] OR     ENT SURGERY  2013    Surgery to correct sinus infection     MOUTH SURGERY  2012    [place] Teeth Removed     VASECTOMY         Current Medications    Current Outpatient Medications:      co-enzyme Q-10 100 MG CAPS capsule, , Disp: , Rfl:      fish oil-omega-3 fatty acids 1000 MG capsule, , Disp: , Rfl:      Multiple Vitamin (MULTIVITAMINS PO), Take  by mouth., Disp: , Rfl:     Family History   Problem Relation Age of Onset     Allergies Mother      Arthritis Mother      Breast Cancer Mother 70     Diabetes Mother      Obesity Mother      Pancreatic Cancer Mother 80     Endometrial Cancer Mother 82     C.A.D. Father 82        MI     Prostate Cancer Father 77     Kidney Cancer Father 83     Hypertension Father      Skin Cancer Father 74     Cancer Father 85        urethra; mets to liver,  at 87     Obesity Sister      Multiple Endocrine Neoplasia Type 1 Sister      Stomach Cancer Sister         3 occurrences     Basal cell carcinoma Sister 63     Bladder Cancer Sister 44        recurrences at 51 and 60     Breast Cancer Sister 63     Squamous cell carcinoma Sister 68     Colon Polyps Sister      Obesity Sister      Hypercalcemia Sister      Thyroid Disease Sister      Colon Polyps Sister         2-5     Anxiety Disorder Sister      Obesity Brother      Diabetes Brother      Colon Polyps Brother         10-11     Basal cell carcinoma Brother 64     Breast Cancer Maternal Grandmother 92         at 99     Brain  Cancer Maternal Uncle 72         shortly thereafter     Colon Cancer No family hx of    Brother - one episode of kidney stones. Sister with thyroid surgery for benign lesion.   No F/H os osteoporosis or hip fractures.   Not told high BP until recently     Social History  Social History     Socioeconomic History     Marital status:      Number of children: 1     Years of education: 16   Occupational History     Occupation: IT      Employer: [employer]   Tobacco Use     Smoking status: Former     Packs/day: 0.50     Years: 3.00     Additional pack years: 0.00     Total pack years: 1.50     Types: Cigarettes     Quit date: 2005     Years since quittin.8     Smokeless tobacco: Never   Vaping Use     Vaping Use: Never used   Substance and Sexual Activity     Alcohol use: Yes     Alcohol/week: 8.3 standard drinks of alcohol     Comment: 6 drinks a week     Drug use: No     Sexual activity: Yes     Partners: Female     Birth control/protection: Male Surgical   Other Topics Concern     Parent/sibling w/ CABG, MI or angioplasty before 65F 55M? No     Review of Systems   Systemic:             weight stable   Eye:                      No eye symptoms   Tyree-Laryngeal:     No tyree-laryngeal symptoms, no dysphagia, no hoarseness, no cough     Breast:                  No breast symptoms  Cardiovascular:    No cardiovascular symptoms, no CP.  Endorses episodes of palpitations with anxiety once a week.   Pulmonary:           No pulmonary symptoms, no SOB or cough    Gastrointestinal:   No gastrointestinal symptoms, no diarrhea or constipation   Genitourinary:       No genitourinary symptoms, no increased thirst or urination   Endocrine:            See Endocrine ROS  Neurological:        No neurological symptoms, no headaches, longstanding tremor of the hands, no numbness or tingling sensation, no dizziness.   Musculoskeletal:  No musculoskeletal symptoms, no muscle or joint pain.   Skin:                     No  "skin symptoms, no dry skin, no hair falling out, no rashes. Benign full body skin check.    Psychological:     Generalized anxiety.     Endocrine ROS:   TSH:   \"Runs hot.\" Symptoms/signs absent:  constipation, diarrhea, temperature intolerance, skin or hair changes, sleep disturbance.   ACTH:   Symptoms/signs absent:  symptoms of hypoglycemia, salt cravings, excessive tanning and prostration with illness  ADH:     Symptoms/signs absent: polyuria, polydypsia and nocturia  PTH:   Symptoms/signs absent: muscle cramps and paresthesias.  PRL:    Breast discharge: absent  GH:    Shoe size unchanged ;  Clothes size unchanged. No appearance change  Headaches: None              Vital Signs     Previous Weights:    Wt Readings from Last 10 Encounters:   11/15/23 108 kg (238 lb)   10/10/23 111.9 kg (246 lb 9.6 oz)   08/07/23 107 kg (236 lb)   07/17/23 102.1 kg (225 lb)   05/04/23 107.9 kg (237 lb 12.8 oz)   08/31/22 102.1 kg (225 lb)   06/03/22 99.8 kg (220 lb)   03/31/22 105.4 kg (232 lb 6.4 oz)   02/01/22 104.8 kg (231 lb)   01/14/22 103 kg (227 lb)        BP (!) 136/93 (BP Location: Left arm, Patient Position: Sitting, Cuff Size: Adult Regular)   Pulse 82   Resp 16   Wt 108 kg (238 lb)   SpO2 99%   BMI 31.40 kg/m      Physical Exam  General Appearance: he is well developed, well nourished and in no distress     Eyes: conjutivae and extra-ocular motions are normal, pupils round and reactive to light, no lid lag, no stare    HEENT: no JVD, no bruits, no thyromegaly, no palpable nodules   Cardiovascular: regular rhythm, no murmurs, distal pulse palpable, no edema  Respiratory: chest clear, no rales, no rhonchi   Gastrointestinal: abdomen soft, non-tender, non-distended, normal bowel sounds, no organomegaly  Musculoskeletal: normal tone and strength  Psychological: affect and judgment normal  Skin: warm, benign skin findings, no lipoma identified, minimal pale stretch marks, sweaty.  Neurological: reflexes normal and " symmetric, very fine tremor.      Lab Results  I reviewed prior lab results documented in Epic.       Latest Reference Range & Units 22 17:06 10/20/23 11:46   Sodium 133 - 144 mmol/L 141    Potassium 3.4 - 5.3 mmol/L 4.2    Chloride 94 - 109 mmol/L 108    Carbon Dioxide 20 - 32 mmol/L 25    Urea Nitrogen 7 - 30 mg/dL 21    Creatinine 0.66 - 1.25 mg/dL 1.18    GFR Estimate >60 mL/min/1.73m2 77    Calcium 8.6 - 10.0 mg/dL 8.8 9.2   Anion Gap 3 - 14 mmol/L 8    Albumin 3.4 - 5.0 g/dL 3.9    Protein Total 6.8 - 8.8 g/dL 6.9    Alkaline Phosphatase 40 - 150 U/L 102    ALT 0 - 70 U/L 40    AST 0 - 45 U/L 24    Bilirubin Total 0.2 - 1.3 mg/dL 0.4    Ins Growth Factor 1 68 - 225 ng/mL  199   Parathyroid Hormone Intact 15 - 65 pg/mL  43       Non-fasting labs   Latest Reference Range & Units 10/20/23 11:46 10/20/23 15:19 11/10/23 10:11   Chromogranin A 0 - 103 ng/mL 57     Patient Fasting?  No  Yes   Gastrin 0 - 100 pg/mL <10     Glucagon <=208 ng/L  503 (H)    Glucose 70 - 99 mg/dL 102 (H)  101 (H)   Pancreatic Polypep 0 - 435 pg/mL 1040 (H)     (H): Data is abnormally high      Genetic Testin2023: Gagandeep is POSITIVE for a likely pathogenic MEN1 mutation. Specifically his mutation is called c.652C>T (also known as [id number]) identified using a custom panel (a combination of the Common hereditary cancers panel plus renal/urinary tract cancers panels) from [employer]..  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 This variant is called c.1561A>T (also known as [id number]). Given the uncertain significance of this result, medical management decisions should NOT be made based on this test result alone.     Imaging     2023 MR PITUITARY WITHOUT AND WITH CONTRAST   INDICATION: Monoallelic mutation of MEN1 gene.  TECHNIQUE: Noncontrast and contrast enhanced MRI of the  brain.  CONTRAST: 11 mL Gadavist  COMPARISON: None.     FINDINGS: There is an 8 mm transverse x 5 mm AP x 4 mm craniocaudal  ovoid area of T2 hyperintense hypoenhancement within the posterior  pituitary gland that is not entirely specific. No suprasellar  abnormality. Optic apparatus, internal carotid artery flow voids and  cavernous sinuses have a normal appearance. There is no restricted  diffusion. Mild mucosal thickening in the sinuses. Mastoid air cells  appear free from significant disease. Intraorbital contents are  unremarkable. Ventricles are within normal limits in size for the  patient's age. Intracranial flow voids are intact. There is no mass  effect, midline shift, or extraaxial collection. Signal intensity of  the brain parenchyma is within normal limits for the patient's age. No  evidence for acute or chronic intracranial blood products.                                                                IMPRESSION: Subtle 8 x 5 x 4 mm area of differential signal within the  posterior pituitary gland could reflect a small adenoma, but is not  entirely specific     8/11/2023 EXAM: CT ABDOMEN PELVIS STONE PROTOCOL WO   INDICATION: Flank pain, kidney stone suspected. Abdominal pain. Right testicle pain.   COMPARISON: CT abdomen and pelvis 09/20/2021.     LOWER CHEST: Normal.   HEPATOBILIARY: No significant mass or bile duct dilatation. No calcified gallstones.   PANCREAS: Normal.   SPLEEN: Calcified cyst along the inferior portion of the spleen has slightly increased in size compared to September 2021, now measuring 3.9 cm, previously 3.0 cm. This remains a benign finding.   ADRENAL GLANDS: Normal.   KIDNEY/BLADDER: There is a 2 mm obstructing calculus in the right proximal ureter with mild upstream hydroureteronephrosis and periureteric edema. Additional very tiny nonobstructing calculus in the right mid to lower pole. There is a tiny nonobstructing calculus in the left upper pole. No significant renal  mass. Bladder is underdistended, limiting evaluation.   BOWEL: No obstruction or inflammatory change.   LYMPH NODES: Normal.   VASCULATURE: No abdominal aortic aneurysm. Trace calcific atherosclerosis.   PELVIC ORGANS: Borderline prostatomegaly.   MUSCULOSKELETAL: Tiny fat-containing periumbilical hernia.   IMPRESSION: There is a 2 mm obstructing calculus in the right proximal ureter with mild upstream hydroureteronephrosis. Nonobstructing bilateral nephrolithiasis.     I, Danielle Burns, was present with the student who participated in the service and in the documentation of the note.  I have verified the history and personally performed the physical exam and medical decision making.  I agree with the assessment and plan of care as documented in the note.     Danielle Burns MD    65 minutes spent on the date of the encounter doing chart review, history and exam, documentation and further activities as noted above.      Again, thank you for allowing me to participate in the care of your patient.        Sincerely,        Danielle Burns MD

## 2023-11-15 NOTE — PROGRESS NOTES
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    MEN1 mutation  He is being seen by endocrinology given the increased risk of various endocrine tumors that develop in MEN1 patients, namely of the parathyroid gland, pituitary gland, and neuroendocrine tumors of pancreas. The endocrine labs which have returned have been normal, apart from several gastrointestinal labs which were collected non-fasting and therefore should be repeated.   Completed biochemical and imaging screening tests revealed a mildly elevated glucagon levels, elevated nonfasting pancreatic polypeptide and a questionable pituitary lesion.    He does not endorse GI issues, skin rashes, signs or symptoms of diabetes, so we would not suspect glucagonoma from a clinical standpoint.  He denies taking medications like PPIs which can increase the glucagon levels.  For now, the plan is to follow-up the pending fasting glucagon from 11/10/2023.  History of kidney stones despite normal calcium levels and normal PTH raises question of hypercalciuria.   On review of Pituitary MRI, unclear of area of hypodensity represents lesion or normal pituitary. Will repeat in 6 months, otherwise of low concern given lack of symptoms and normal pituitary hormones.     He should complete the MR Chest for evaluation of thymoma, which is very rare but can be seen in MEN1. MR Abdomen unnecessary at this time given normal CT Abd/pelvis.     Plan  -24 hour urine Calcium and phosphorus  -A1C  -TSH with reflex T4  -MRI Pituitary in 6 months  -MR Chest for thymoma whenever able   -We will try to obtain the abdominal CT images from August 2023, for our review.    RTC 6 months    Orders Placed This Encounter   Procedures    MR Chest w/o & w Contrast    MR  "Pituitary w/o & w Contrast    Calcium timed urine    Creatinine timed urine    Phosphorus timed urine    Hemoglobin A1c    TSH with free T4 reflex      Martha Whyte MS3      =======================================================  The patient is seen in consultation at the request of Dr. Joceline Dior.     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    Patient has a very strong family history of cancers on both sides, see family history below. His sister was found to have a MEN1 mutation earlier this year in the context of recurring gastric cancer, hypercalcemia, thyroid disease. Patient saw genetic counselor 5/22/2023 due to family history with syndromic pattern. 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     Patient met with Oncology on 10/10/2023 to discuss the recommended screening (biochemical and imaging) for patients with MEN1 mutation. He also underwent a full body skin check on 8/30/2023 with benign findings.      Overall he feels well. He endorses occasional palpitations associated with anxiety, occur weekly. History of paroxysmal afib which is presumed to be related to ALAYNA, he has presented to ED several times during these episodes.     He has a history of kidney stones, estimates \"one every year and a half\" for which he saw urology. Determined to be calcium oxalate stones. Historically his calcium has always been normal on labs. He says he was previously asked to collect 24hour urine but didn't, wasn't followed up. His brother has had a kidney stone.     Labs thus far have included normal PRL, normal IGF-1, normal serum calcium and intact PTH. Gastrointestinal labs were collected while non-fasting.     MR" "Pituitary on 11/1/2023, Subtle 8 x 5 x 4 mm area of differential signal within the posterior pituitary gland could reflect a small adenoma, but is not entirely specific.  Images reviewed by us.  No clear adenoma identified.    CT Abdomen/Pelvis 8/11/2023, normal apart from 2 mm obstructing calculus in the right proximal ureter with mild upstream hydroureteronephrosis, nonobstructing bilateral nephrolithiasis.  Images were not available for our review at the time of the visit.    Was scheduled for MRI Chest and Abdomen, at his appointment he was told the order for imaging was unclear as to \"what they were looking for,\" and the imaging was cancelled.     Pertinent labs reviewed:  10/20/23   chromogranin A normal at 57, glucagon 503 (<208), glucose 102 (nonfasting)   pancreatic polypeptide 1040 (0-435)  prolactin 9, IGF1 normal at 199   calcium 9.2, PTH 43    gastrin < 10     11/10/23   fasting glucose 101 (pt had some peppermints just prior to having the blood drawn)    1/14/22   A1c 5.1, TSH 0.55     9/2021 Calculi composed primarily of: 90% calcium oxalate monohydrate, and  10% calcium oxalate dihydrate.     Past Medical History   Past Medical History:   Diagnosis Date    Facial nerve palsy     LT congenital     Hyperlipidemia LDL goal < 160 05/07/2012    Kidney stone 09/20/2021    MEN1 (multiple endocrine neoplasia) (H)     Nephrolithiasis     has had it 3 times-last one was in 2016    New onset atrial fibrillation (H) 05/17/2021    Varicocele     LT   Colon polyps  Seborrheic keratosis, lentigo, angioma, benign nevi (derm exam 8/23)    Past Surgical History   Past Surgical History:   Procedure Laterality Date    COLONOSCOPY N/A 6/13/2022    Procedure: COLONOSCOPY, FLEXIBLE, WITH LESION REMOVAL USING SNARE;  Surgeon: Mukund Hayes MD;  Location: [facility] OR    COLONOSCOPY WITH CO2 INSUFFLATION N/A 6/13/2022    Procedure: COLONOSCOPY, WITH CO2 INSUFFLATION;  Surgeon: Mukund Hayes MD;  Location:  OR "    ENDOSCOPIC SINUS SURGERY  2013    Procedure: ENDOSCOPIC SINUS SURGERY;  Bilateral total ethmoidectomy, bilateral endoscopic maxillary antrostomy;  Surgeon: Han Toledo MD;  Location: [facility] OR    ENT SURGERY  2013    Surgery to correct sinus infection    MOUTH SURGERY  2012    [place] Teeth Removed    VASECTOMY         Current Medications    Current Outpatient Medications:     co-enzyme Q-10 100 MG CAPS capsule, , Disp: , Rfl:     fish oil-omega-3 fatty acids 1000 MG capsule, , Disp: , Rfl:     Multiple Vitamin (MULTIVITAMINS PO), Take  by mouth., Disp: , Rfl:     Family History   Problem Relation Age of Onset    Allergies Mother     Arthritis Mother     Breast Cancer Mother 70    Diabetes Mother     Obesity Mother     Pancreatic Cancer Mother 80    Endometrial Cancer Mother 82    C.A.D. Father 82        MI    Prostate Cancer Father 77    Kidney Cancer Father 83    Hypertension Father     Skin Cancer Father 74    Cancer Father 85        urethra; mets to liver,  at 87    Obesity Sister     Multiple Endocrine Neoplasia Type 1 Sister     Stomach Cancer Sister         3 occurrences    Basal cell carcinoma Sister 63    Bladder Cancer Sister 44        recurrences at 51 and 60    Breast Cancer Sister 63    Squamous cell carcinoma Sister 68    Colon Polyps Sister     Obesity Sister     Hypercalcemia Sister     Thyroid Disease Sister     Colon Polyps Sister         2-5    Anxiety Disorder Sister     Obesity Brother     Diabetes Brother     Colon Polyps Brother         10-11    Basal cell carcinoma Brother 64    Breast Cancer Maternal Grandmother 92         at 99    Brain Cancer Maternal Uncle 72         shortly thereafter    Colon Cancer No family hx of    Brother - one episode of kidney stones. Sister with thyroid surgery for benign lesion.   No F/H os osteoporosis or hip fractures.   Not told high BP until recently     Social History  Social History     Socioeconomic History    Marital  "status:     Number of children: 1    Years of education: 16   Occupational History    Occupation: IT      Employer: [employer]   Tobacco Use    Smoking status: Former     Packs/day: 0.50     Years: 3.00     Additional pack years: 0.00     Total pack years: 1.50     Types: Cigarettes     Quit date: 2005     Years since quittin.8    Smokeless tobacco: Never   Vaping Use    Vaping Use: Never used   Substance and Sexual Activity    Alcohol use: Yes     Alcohol/week: 8.3 standard drinks of alcohol     Comment: 6 drinks a week    Drug use: No    Sexual activity: Yes     Partners: Female     Birth control/protection: Male Surgical   Other Topics Concern    Parent/sibling w/ CABG, MI or angioplasty before 65F 55M? No     Review of Systems   Systemic:             weight stable   Eye:                      No eye symptoms   Tyree-Laryngeal:     No tyree-laryngeal symptoms, no dysphagia, no hoarseness, no cough     Breast:                  No breast symptoms  Cardiovascular:    No cardiovascular symptoms, no CP.  Endorses episodes of palpitations with anxiety once a week.   Pulmonary:           No pulmonary symptoms, no SOB or cough    Gastrointestinal:   No gastrointestinal symptoms, no diarrhea or constipation   Genitourinary:       No genitourinary symptoms, no increased thirst or urination   Endocrine:            See Endocrine ROS  Neurological:        No neurological symptoms, no headaches, longstanding tremor of the hands, no numbness or tingling sensation, no dizziness.   Musculoskeletal:  No musculoskeletal symptoms, no muscle or joint pain.   Skin:                     No skin symptoms, no dry skin, no hair falling out, no rashes. Benign full body skin check.    Psychological:     Generalized anxiety.     Endocrine ROS:   TSH:   \"Runs hot.\" Symptoms/signs absent:  constipation, diarrhea, temperature intolerance, skin or hair changes, sleep disturbance.   ACTH:   Symptoms/signs absent:  symptoms of " hypoglycemia, salt cravings, excessive tanning and prostration with illness  ADH:     Symptoms/signs absent: polyuria, polydypsia and nocturia  PTH:   Symptoms/signs absent: muscle cramps and paresthesias.  PRL:    Breast discharge: absent  GH:    Shoe size unchanged ;  Clothes size unchanged. No appearance change  Headaches: None              Vital Signs     Previous Weights:    Wt Readings from Last 10 Encounters:   11/15/23 108 kg (238 lb)   10/10/23 111.9 kg (246 lb 9.6 oz)   08/07/23 107 kg (236 lb)   07/17/23 102.1 kg (225 lb)   05/04/23 107.9 kg (237 lb 12.8 oz)   08/31/22 102.1 kg (225 lb)   06/03/22 99.8 kg (220 lb)   03/31/22 105.4 kg (232 lb 6.4 oz)   02/01/22 104.8 kg (231 lb)   01/14/22 103 kg (227 lb)        BP (!) 136/93 (BP Location: Left arm, Patient Position: Sitting, Cuff Size: Adult Regular)   Pulse 82   Resp 16   Wt 108 kg (238 lb)   SpO2 99%   BMI 31.40 kg/m      Physical Exam  General Appearance: he is well developed, well nourished and in no distress     Eyes: conjutivae and extra-ocular motions are normal, pupils round and reactive to light, no lid lag, no stare    HEENT: no JVD, no bruits, no thyromegaly, no palpable nodules   Cardiovascular: regular rhythm, no murmurs, distal pulse palpable, no edema  Respiratory: chest clear, no rales, no rhonchi   Gastrointestinal: abdomen soft, non-tender, non-distended, normal bowel sounds, no organomegaly  Musculoskeletal: normal tone and strength  Psychological: affect and judgment normal  Skin: warm, benign skin findings, no lipoma identified, minimal pale stretch marks, sweaty.  Neurological: reflexes normal and symmetric, very fine tremor.      Lab Results  I reviewed prior lab results documented in Epic.       Latest Reference Range & Units 01/14/22 17:06 10/20/23 11:46   Sodium 133 - 144 mmol/L 141    Potassium 3.4 - 5.3 mmol/L 4.2    Chloride 94 - 109 mmol/L 108    Carbon Dioxide 20 - 32 mmol/L 25    Urea Nitrogen 7 - 30 mg/dL 21     Creatinine 0.66 - 1.25 mg/dL 1.18    GFR Estimate >60 mL/min/1.73m2 77    Calcium 8.6 - 10.0 mg/dL 8.8 9.2   Anion Gap 3 - 14 mmol/L 8    Albumin 3.4 - 5.0 g/dL 3.9    Protein Total 6.8 - 8.8 g/dL 6.9    Alkaline Phosphatase 40 - 150 U/L 102    ALT 0 - 70 U/L 40    AST 0 - 45 U/L 24    Bilirubin Total 0.2 - 1.3 mg/dL 0.4    Ins Growth Factor 1 68 - 225 ng/mL  199   Parathyroid Hormone Intact 15 - 65 pg/mL  43       Non-fasting labs   Latest Reference Range & Units 10/20/23 11:46 10/20/23 15:19 11/10/23 10:11   Chromogranin A 0 - 103 ng/mL 57     Patient Fasting?  No  Yes   Gastrin 0 - 100 pg/mL <10     Glucagon <=208 ng/L  503 (H)    Glucose 70 - 99 mg/dL 102 (H)  101 (H)   Pancreatic Polypep 0 - 435 pg/mL 1040 (H)     (H): Data is abnormally high      Genetic Testin2023: Gagandeep is POSITIVE for a likely pathogenic MEN1 mutation. Specifically his mutation is called c.652C>T (also known as [id number]) identified using a custom panel (a combination of the Common hereditary cancers panel plus renal/urinary tract cancers panels) from [facility]..  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 This variant is called c.1561A>T (also known as [id number]). Given the uncertain significance of this result, medical management decisions should NOT be made based on this test result alone.     Imaging     2023 MR PITUITARY WITHOUT AND WITH CONTRAST   INDICATION: Monoallelic mutation of MEN1 gene.  TECHNIQUE: Noncontrast and contrast enhanced MRI of the brain.  CONTRAST: 11 mL Gadavist  COMPARISON: None.     FINDINGS: There is an 8 mm transverse x 5 mm AP x 4 mm craniocaudal  ovoid area of T2 hyperintense hypoenhancement within the posterior  pituitary gland that is not entirely specific. No suprasellar  abnormality. Optic apparatus, internal carotid artery flow voids and  cavernous sinuses  have a normal appearance. There is no restricted  diffusion. Mild mucosal thickening in the sinuses. Mastoid air cells  appear free from significant disease. Intraorbital contents are  unremarkable. Ventricles are within normal limits in size for the  patient's age. Intracranial flow voids are intact. There is no mass  effect, midline shift, or extraaxial collection. Signal intensity of  the brain parenchyma is within normal limits for the patient's age. No  evidence for acute or chronic intracranial blood products.                                                                IMPRESSION: Subtle 8 x 5 x 4 mm area of differential signal within the  posterior pituitary gland could reflect a small adenoma, but is not  entirely specific     8/11/2023 EXAM: CT ABDOMEN PELVIS STONE PROTOCOL WO   INDICATION: Flank pain, kidney stone suspected. Abdominal pain. Right testicle pain.   COMPARISON: CT abdomen and pelvis 09/20/2021.     LOWER CHEST: Normal.   HEPATOBILIARY: No significant mass or bile duct dilatation. No calcified gallstones.   PANCREAS: Normal.   SPLEEN: Calcified cyst along the inferior portion of the spleen has slightly increased in size compared to September 2021, now measuring 3.9 cm, previously 3.0 cm. This remains a benign finding.   ADRENAL GLANDS: Normal.   KIDNEY/BLADDER: There is a 2 mm obstructing calculus in the right proximal ureter with mild upstream hydroureteronephrosis and periureteric edema. Additional very tiny nonobstructing calculus in the right mid to lower pole. There is a tiny nonobstructing calculus in the left upper pole. No significant renal mass. Bladder is underdistended, limiting evaluation.   BOWEL: No obstruction or inflammatory change.   LYMPH NODES: Normal.   VASCULATURE: No abdominal aortic aneurysm. Trace calcific atherosclerosis.   PELVIC ORGANS: Borderline prostatomegaly.   MUSCULOSKELETAL: Tiny fat-containing periumbilical hernia.   IMPRESSION: There is a 2 mm obstructing  calculus in the right proximal ureter with mild upstream hydroureteronephrosis. Nonobstructing bilateral nephrolithiasis.     I, Danielle Burns, was present with the student who participated in the service and in the documentation of the note.  I have verified the history and personally performed the physical exam and medical decision making.  I agree with the assessment and plan of care as documented in the note.     Danielle Burns MD    65 minutes spent on the date of the encounter doing chart review, history and exam, documentation and further activities as noted above.

## 2023-11-15 NOTE — NURSING NOTE
Gagandeep Oswald's goals for this visit include:   Chief Complaint   Patient presents with    New Patient     MEN1 mutation       He requests these members of his care team be copied on today's visit information: yes    PCP: Claudia Solo    Referring Provider:  ANGELO Dow [facility]  [address] [place] [address]    BP (!) 136/93 (BP Location: Left arm, Patient Position: Sitting, Cuff Size: Adult Regular)   Pulse 82   Resp 16   Wt 108 kg (238 lb)   SpO2 99%   BMI 31.40 kg/m      Do you need any medication refills at today's visit? None    Anuel Burton, EMT

## 2023-11-16 LAB — GLUCAGON SERPL-MCNC: 256 NG/L

## 2023-11-18 NOTE — RESULT ENCOUNTER NOTE
The glucagon level is significantly lower. Considering this borderline elevated result and the absence of symptoms, I recommend to have it monitored, periodically. I placed orders to have both the glucose and glucagon level rechecked in 6 months, fasting.

## 2023-11-19 PROCEDURE — 84105 ASSAY OF URINE PHOSPHORUS: CPT | Performed by: INTERNAL MEDICINE

## 2023-11-19 PROCEDURE — 82340 ASSAY OF CALCIUM IN URINE: CPT | Performed by: INTERNAL MEDICINE

## 2023-11-19 PROCEDURE — 81050 URINALYSIS VOLUME MEASURE: CPT | Performed by: INTERNAL MEDICINE

## 2023-11-19 PROCEDURE — 82570 ASSAY OF URINE CREATININE: CPT | Performed by: INTERNAL MEDICINE

## 2023-11-20 ENCOUNTER — APPOINTMENT (OUTPATIENT)
Dept: LAB | Facility: CLINIC | Age: 48
End: 2023-11-20
Payer: COMMERCIAL

## 2023-11-20 LAB
COLLECT DURATION TIME UR: 24 H
CREAT 24H UR-MRATE: 1.82 G/SPEC (ref 0.98–2.2)
CREAT UR-MCNC: 59.6 MG/DL
SPECIMEN VOL UR: 3050 ML

## 2023-11-21 LAB
CALCIUM 24H UR-MRATE: 0.54 G/SPEC (ref 0.1–0.3)
CALCIUM UR-MCNC: 17.6 MG/DL
COLLECT DURATION TIME UR: 24 H
COLLECT DURATION TIME UR: 24 H
PHOSPHATE 24H UR-MRATE: 1.31 G/SPEC (ref 0.4–1.3)
PHOSPHATE UR-MCNC: 42.8 MG/DL (ref 40–136)
SPECIMEN VOL UR: 3050 ML
SPECIMEN VOL UR: 3050 ML

## 2023-11-22 NOTE — RESULT ENCOUNTER NOTE
The urinary calcium came back elevated.  We can consider starting a medication which can decrease the urinary calcium.  Before doing this, I want you to have the calcium rechecked, both in the blood and in the urine, to confirm this finding. I placed orders for another 24 hrs urine collection and blood work.

## 2023-12-08 ENCOUNTER — ANCILLARY PROCEDURE (OUTPATIENT)
Dept: MRI IMAGING | Facility: CLINIC | Age: 48
End: 2023-12-08
Attending: INTERNAL MEDICINE
Payer: COMMERCIAL

## 2023-12-08 DIAGNOSIS — E31.21 MEN 1 SYNDROME (H): ICD-10-CM

## 2023-12-08 PROCEDURE — A9585 GADOBUTROL INJECTION: HCPCS | Mod: JZ | Performed by: RADIOLOGY

## 2023-12-08 PROCEDURE — 71552 MRI CHEST W/O & W/DYE: CPT | Mod: TC | Performed by: RADIOLOGY

## 2023-12-08 RX ORDER — GADOBUTROL 604.72 MG/ML
10 INJECTION INTRAVENOUS ONCE
Status: COMPLETED | OUTPATIENT
Start: 2023-12-08 | End: 2023-12-08

## 2023-12-08 RX ADMIN — GADOBUTROL 10 ML: 604.72 INJECTION INTRAVENOUS at 13:02

## 2023-12-30 LAB — VIP SERPL-MCNC: 38 PG/ML

## 2024-04-04 SDOH — HEALTH STABILITY: PHYSICAL HEALTH: ON AVERAGE, HOW MANY MINUTES DO YOU ENGAGE IN EXERCISE AT THIS LEVEL?: 50 MIN

## 2024-04-04 SDOH — HEALTH STABILITY: PHYSICAL HEALTH: ON AVERAGE, HOW MANY DAYS PER WEEK DO YOU ENGAGE IN MODERATE TO STRENUOUS EXERCISE (LIKE A BRISK WALK)?: 4 DAYS

## 2024-04-04 ASSESSMENT — SOCIAL DETERMINANTS OF HEALTH (SDOH): HOW OFTEN DO YOU GET TOGETHER WITH FRIENDS OR RELATIVES?: NEVER

## 2024-04-11 ENCOUNTER — OFFICE VISIT (OUTPATIENT)
Dept: FAMILY MEDICINE | Facility: CLINIC | Age: 49
End: 2024-04-11
Payer: COMMERCIAL

## 2024-04-11 VITALS
SYSTOLIC BLOOD PRESSURE: 130 MMHG | TEMPERATURE: 97.5 F | WEIGHT: 242 LBS | BODY MASS INDEX: 32.07 KG/M2 | HEART RATE: 80 BPM | RESPIRATION RATE: 16 BRPM | DIASTOLIC BLOOD PRESSURE: 87 MMHG | OXYGEN SATURATION: 97 % | HEIGHT: 73 IN

## 2024-04-11 DIAGNOSIS — Z15.89 MONOALLELIC MUTATION OF MEN1 GENE: ICD-10-CM

## 2024-04-11 DIAGNOSIS — E23.7 PITUITARY LESION (H): ICD-10-CM

## 2024-04-11 DIAGNOSIS — G47.33 OSA (OBSTRUCTIVE SLEEP APNEA): Chronic | ICD-10-CM

## 2024-04-11 DIAGNOSIS — Z00.00 ROUTINE GENERAL MEDICAL EXAMINATION AT A HEALTH CARE FACILITY: ICD-10-CM

## 2024-04-11 DIAGNOSIS — E31.21 MULTIPLE ENDOCRINE NEOPLASIA (MEN) TYPE I (H): ICD-10-CM

## 2024-04-11 DIAGNOSIS — E78.5 HYPERLIPIDEMIA WITH TARGET LDL LESS THAN 160: ICD-10-CM

## 2024-04-11 DIAGNOSIS — Z15.81 MONOALLELIC MUTATION OF MEN1 GENE: ICD-10-CM

## 2024-04-11 DIAGNOSIS — Z80.42 FAMILY HISTORY OF PROSTATE CANCER: Primary | ICD-10-CM

## 2024-04-11 DIAGNOSIS — Z83.719 FAMILY HISTORY OF COLONIC POLYPS: ICD-10-CM

## 2024-04-11 DIAGNOSIS — I48.0 PAF (PAROXYSMAL ATRIAL FIBRILLATION) (H): Chronic | ICD-10-CM

## 2024-04-11 DIAGNOSIS — Z12.5 SCREENING FOR PROSTATE CANCER: ICD-10-CM

## 2024-04-11 PROCEDURE — 36415 COLL VENOUS BLD VENIPUNCTURE: CPT | Performed by: FAMILY MEDICINE

## 2024-04-11 PROCEDURE — 80061 LIPID PANEL: CPT | Performed by: FAMILY MEDICINE

## 2024-04-11 PROCEDURE — 90750 HZV VACC RECOMBINANT IM: CPT | Performed by: FAMILY MEDICINE

## 2024-04-11 PROCEDURE — 90471 IMMUNIZATION ADMIN: CPT | Performed by: FAMILY MEDICINE

## 2024-04-11 PROCEDURE — G0103 PSA SCREENING: HCPCS | Performed by: FAMILY MEDICINE

## 2024-04-11 PROCEDURE — 99396 PREV VISIT EST AGE 40-64: CPT | Mod: 25 | Performed by: FAMILY MEDICINE

## 2024-04-11 PROCEDURE — 99213 OFFICE O/P EST LOW 20 MIN: CPT | Mod: 25 | Performed by: FAMILY MEDICINE

## 2024-04-11 ASSESSMENT — PAIN SCALES - GENERAL: PAINLEVEL: NO PAIN (0)

## 2024-04-11 NOTE — PATIENT INSTRUCTIONS
"Eating Healthy Foods: Care Instructions  With every meal, you can make healthy food choices. Try to eat a variety of fruits, vegetables, whole grains, lean proteins, and low-fat dairy products. This can help you get the right balance of nutrients, including vitamins and minerals. Small changes add up over time. You can start by adding one healthy food to your meals each day.    Try to make half your plate fruits and vegetables, one-fourth whole grains, and one-fourth lean proteins. Try including dairy with your meals.   Eat more fruits and vegetables. Try to have them with most meals and snacks.   Foods for healthy eating    Fruits    These can be fresh, frozen, canned, or dried.  Try to choose whole fruit rather than fruit juice.  Eat a variety of colors.    Vegetables    These can be fresh, frozen, canned, or dried.  Beans, peas, and lentils count too.    Whole grains    Choose whole-grain breads, cereals, and noodles.  Try brown rice.    Lean proteins    These can include lean meat, poultry, fish, and eggs.  You can also have tofu, beans, peas, lentils, nuts, and seeds.    Dairy    Try milk, yogurt, and cheese.  Choose low-fat or fat-free when you can.  If you need to, use lactose-free milk or fortified plant-based milk products, such as soy milk.    Water    Drink water when you're thirsty.  Limit sugar-sweetened drinks, including soda, fruit drinks, and sports drinks.  Where can you learn more?  Go to https://www.[employer].net/patiented  Enter T756 in the search box to learn more about \"Eating Healthy Foods: Care Instructions.\"  Current as of: September 20, 2023               Content Version: 14.0    1876-7455 [employer].   Care instructions adapted under license by your healthcare professional. If you have questions about a medical condition or this instruction, always ask your healthcare professional. [employer] disclaims any warranty or liability for your use of this " information.      Body Mass Index: Care Instructions  Overview     Body mass index (BMI) can help you see if your weight is raising your risk for health problems. It uses a formula to compare how much you weigh with how tall you are.  A BMI lower than 18.5 is considered underweight.  A BMI between 18.5 and 24.9 is considered healthy.  A BMI between 25 and 29.9 is considered overweight. A BMI of 30 or higher is considered obese.  If your BMI is in the normal range, it means that you have a lower risk for weight-related health problems. If your BMI is in the overweight or obese range, you may be at increased risk for weight-related health problems, such as high blood pressure, heart disease, stroke, arthritis or joint pain, and diabetes. If your BMI is in the underweight range, you may be at increased risk for health problems such as fatigue, lower protection (immunity) against illness, muscle loss, bone loss, hair loss, and hormone problems.  BMI is just one measure of your risk for weight-related health problems. You may be at higher risk for health problems if you are not active, you eat an unhealthy diet, or you drink too much alcohol or use tobacco products.  Follow-up care is a key part of your treatment and safety. Be sure to make and go to all appointments, and call your doctor if you are having problems. It's also a good idea to know your test results and keep a list of the medicines you take.  How can you care for yourself at home?  Practice healthy eating habits. This includes eating plenty of fruits, vegetables, whole grains, lean protein, and low-fat dairy.  If your doctor recommends it, get more exercise. Walking is a good choice. Bit by bit, increase the amount you walk every day. Try for at least 30 minutes on most days of the week.  Do not smoke. Smoking can increase your risk for health problems. If you need help quitting, talk to your doctor about stop-smoking programs and medicines. These can  "increase your chances of quitting for good.  Limit alcohol to 2 drinks a day for men and 1 drink a day for women. Too much alcohol can cause health problems.  If you have a BMI higher than 25  Your doctor may do other tests to check your risk for weight-related health problems. This may include measuring the distance around your waist. A waist measurement of more than 40 inches in men or 35 inches in women can increase the risk of weight-related health problems.  Talk with your doctor about steps you can take to stay healthy or improve your health. You may need to make lifestyle changes to lose weight and stay healthy, such as changing your diet and getting regular exercise.  If you have a BMI lower than 18.5  Your doctor may do other tests to check your risk for health problems.  Talk with your doctor about steps you can take to stay healthy or improve your health. You may need to make lifestyle changes to gain or maintain weight and stay healthy, such as getting more healthy foods in your diet and doing exercises to build muscle.  Where can you learn more?  Go to https://www.[employer].net/patiented  Enter S176 in the search box to learn more about \"Body Mass Index: Care Instructions.\"  Current as of: May 13, 2023               Content Version: 14.0    0609-3640 [employer].   Care instructions adapted under license by your healthcare professional. If you have questions about a medical condition or this instruction, always ask your healthcare professional. [employer] disclaims any warranty or liability for your use of this information.      Preventive Care Advice   This is general advice given by our system to help you stay healthy. However, your care team may have specific advice just for you. Please talk to your care team about your preventive care needs.  Nutrition  Eat 5 or more servings of fruits and vegetables each day.  Try wheat bread, brown rice and whole grain pasta (instead of " white bread, rice, and pasta).  Get enough calcium and vitamin D. Check the label on foods and aim for 100% of the RDA (recommended daily allowance).  Lifestyle  Exercise at least 150 minutes each week   (30 minutes a day, 5 days a week).  Do muscle strengthening activities 2 days a week. These help control your weight and prevent disease.  No smoking.  Wear sunscreen to prevent skin cancer.  Have a dental exam and cleaning every 6 months.  Yearly exams  See your health care team every year to talk about:  Any changes in your health.  Any medicines your care team has prescribed.  Preventive care, family planning, and ways to prevent chronic diseases.  Shots (vaccines)   HPV shots (up to age 26), if you've never had them before.  Hepatitis B shots (up to age 59), if you've never had them before.  COVID-19 shot: Get this shot when it's due.  Flu shot: Get a flu shot every year.  Tetanus shot: Get a tetanus shot every 10 years.  Pneumococcal, hepatitis A, and RSV shots: Ask your care team if you need these based on your risk.  Shingles shot (for age 50 and up).  General health tests  Diabetes screening:  Starting at age 35, Get screened for diabetes at least every 3 years.  If you are younger than age 35, ask your care team if you should be screened for diabetes.  Cholesterol test: At age 39, start having a cholesterol test every 5 years, or more often if advised.  Bone density scan (DEXA): At age 50, ask your care team if you should have this scan for osteoporosis (brittle bones).  Hepatitis C: Get tested at least once in your life.  STIs (sexually transmitted infections)  Before age 24: Ask your care team if you should be screened for STIs.  After age 24: Get screened for STIs if you're at risk. You are at risk for STIs (including HIV) if:  You are sexually active with more than one person.  You don't use condoms every time.  You or a partner was diagnosed with a sexually transmitted infection.  If you are at risk  for HIV, ask about PrEP medicine to prevent HIV.  Get tested for HIV at least once in your life, whether you are at risk for HIV or not.  Cancer screening tests  Cervical cancer screening: If you have a cervix, begin getting regular cervical cancer screening tests at age 21. Most people who have regular screenings with normal results can stop after age 65. Talk about this with your provider.  Breast cancer scan (mammogram): If you've ever had breasts, begin having regular mammograms starting at age 40. This is a scan to check for breast cancer.  Colon cancer screening: It is important to start screening for colon cancer at age 45.  Have a colonoscopy test every 10 years (or more often if you're at risk) Or, ask your provider about stool tests like a FIT test every year or Cologuard test every 3 years.  To learn more about your testing options, visit: https://[email or web address]/954785.pdf.  For help making a decision, visit: https://[employer]/ud78287.  Prostate cancer screening test: If you have a prostate and are age 55 to 69, ask your provider if you would benefit from a yearly prostate cancer screening test.  Lung cancer screening: If you are a current or former smoker age 50 to 80, ask your care team if ongoing lung cancer screenings are right for you.  For informational purposes only. Not to replace the advice of your health care provider. Copyright   2023 [facility]. All rights reserved. Clinically reviewed by the [facility]itions Program. [employer] 044114 - REV 01/24.    Relationships for Good Health  Relationships are important for our health and happiness. Social isolation, loneliness and lack of support are bad for your health. Studies show that loneliness can harm health and limit your life span as much as high blood pressure and smoking.   Take some time to reflect on your relationships. Then answer these questions:  Are there people in your life that cause you stress or drain your  energy? What can you do to set limits?  ________________________________________________________________________________________________________________________________________________________________________________________________________________________________________________________________________________________________________________________________________________  Who do you enjoy spending time with? Who can you go to for support?  ________________________________________________________________________________________________________________________________________________________________________________________________________________________________________________________________________________________________________________________________________________  What can you do to improve your relationships with others?  __________________________________________________________________________________________________________________________________________________________________________________________________________________  ______________________________________________________________________________________________________________________________  What do you like most about your relationships with others?  ________________________________________________________________________________________________________________________________________________________________________________________________________________________________________________________________________________________________________________________________________________  My goal: ______________________________________________________________________  I will ______________________________________________________________________________________________________________________________________________________________________________________________    For informational purposes only. Not to replace the advice of your health care  provider. Copyright   2018 [facility]. All rights reserved. Clinically reviewed by [facility] Health  Team. [employer] 821596 - Rev 04/21.    Learning About Stress  What is stress?     Stress is your body's response to a hard situation. Your body can have a physical, emotional, or mental response. Stress is a fact of life for most people, and it affects everyone differently. What causes stress for you may not be stressful for someone else.  A lot of things can cause stress. You may feel stress when you go on a job interview, take a test, or run a race. This kind of short-term stress is normal and even useful. It can help you if you need to work hard or react quickly. For example, stress can help you finish an important job on time.  Long-term stress is caused by ongoing stressful situations or events. Examples of long-term stress include long-term health problems, ongoing problems at work, or conflicts in your family. Long-term stress can harm your health.  How does stress affect your health?  When you are stressed, your body responds as though you are in danger. It makes hormones that speed up your heart, make you breathe faster, and give you a burst of energy. This is called the fight-or-flight stress response. If the stress is over quickly, your body goes back to normal and no harm is done.  But if stress happens too often or lasts too long, it can have bad effects. Long-term stress can make you more likely to get sick, and it can make symptoms of some diseases worse. If you tense up when you are stressed, you may develop neck, shoulder, or low back pain. Stress is linked to high blood pressure and heart disease.  Stress also harms your emotional health. It can make you rodriguez, tense, or depressed. Your relationships may suffer, and you may not do well at work or school.  What can you do to manage stress?  You can try these things to help manage stress:   Do something active. Exercise or activity  can help reduce stress. Walking is a great way to get started. Even everyday activities such as housecleaning or yard work can help.  Try yoga or sun chi. These techniques combine exercise and meditation. You may need some training at first to learn them.  Do something you enjoy. For example, listen to music or go to a movie. Practice your hobby or do volunteer work.  Meditate. This can help you relax, because you are not worrying about what happened before or what may happen in the future.  Do guided imagery. Imagine yourself in any setting that helps you feel calm. You can use online videos, books, or a teacher to guide you.  Do breathing exercises. For example:  From a standing position, bend forward from the waist with your knees slightly bent. Let your arms dangle close to the floor.  Breathe in slowly and deeply as you return to a standing position. Roll up slowly and lift your head last.  Hold your breath for just a few seconds in the standing position.  Breathe out slowly and bend forward from the waist.  Let your feelings out. Talk, laugh, cry, and express anger when you need to. Talking with supportive friends or family, a counselor, or a bam leader about your feelings is a healthy way to relieve stress. Avoid discussing your feelings with people who make you feel worse.  Write. It may help to write about things that are bothering you. This helps you find out how much stress you feel and what is causing it. When you know this, you can find better ways to cope.  What can you do to prevent stress?  You might try some of these things to help prevent stress:  Manage your time. This helps you find time to do the things you want and need to do.  Get enough sleep. Your body recovers from the stresses of the day while you are sleeping.  Get support. Your family, friends, and community can make a difference in how you experience stress.  Limit your news feed. Avoid or limit time on social media or news that may  "make you feel stressed.  Do something active. Exercise or activity can help reduce stress. Walking is a great way to get started.  Where can you learn more?  Go to https://www.[employer].net/patiented  Enter N032 in the search box to learn more about \"Learning About Stress.\"  Current as of: October 24, 2023               Content Version: 14.0    4355-3686 [employer].   Care instructions adapted under license by your healthcare professional. If you have questions about a medical condition or this instruction, always ask your healthcare professional. [employer] disclaims any warranty or liability for your use of this information.      Substance Use Disorder: Care Instructions  Overview     You can improve your life and health by stopping your use of alcohol or drugs. When you don't drink or use drugs, you may feel and sleep better. You may get along better with your family, friends, and coworkers. There are medicines and programs that can help with substance use disorder.  How can you care for yourself at home?  Here are some ways to help you stay sober and prevent relapse.  If you have been given medicine to help keep you sober or reduce your cravings, be sure to take it exactly as prescribed.  Talk to your doctor about programs that can help you stop using drugs or drinking alcohol.  Do not keep alcohol or drugs in your home.  Plan ahead. Think about what you'll say if other people ask you to drink or use drugs. Try not to spend time with people who drink or use drugs.  Use the time and money spent on drinking or drugs to do something that's important to you.  Preventing a relapse  Have a plan to deal with relapse. Learn to recognize changes in your thinking that lead you to drink or use drugs. Get help before you start to drink or use drugs again.  Try to stay away from situations, friends, or places that may lead you to drink or use drugs.  If you feel the need to drink alcohol or use " drugs again, seek help right away. Call a trusted friend or family member. Some people get support from organizations such as Narcotics Anonymous or [employer] or from treatment facilities.  If you relapse, get help as soon as you can. Some people make a plan with another person that outlines what they want that person to do for them if they relapse. The plan usually includes how to handle the relapse and who to notify in case of relapse.  Don't give up. Remember that a relapse doesn't mean that you have failed. Use the experience to learn the triggers that lead you to drink or use drugs. Then quit again. Recovery is a lifelong process. Many people have several relapses before they are able to quit for good.  Follow-up care is a key part of your treatment and safety. Be sure to make and go to all appointments, and call your doctor if you are having problems. It's also a good idea to know your test results and keep a list of the medicines you take.  When should you call for help?   Call 911  anytime you think you may need emergency care. For example, call if you or someone else:    Has overdosed or has withdrawal signs. Be sure to tell the emergency workers that you are or someone else is using or trying to quit using drugs. Overdose or withdrawal signs may include:  Losing consciousness.  Seizure.  Seeing or hearing things that aren't there (hallucinations).     Is thinking or talking about suicide or harming others.   Where to get help 24 hours a day, 7 days a week   If you or someone you know talks about suicide, self-harm, a mental health crisis, a substance use crisis, or any other kind of emotional distress, get help right away. You can:    Call the Suicide and Crisis Lifeline at 988.     Call 4-746-185-TALK (1-326.578.2525).     Text HOME to 478820 to access the Crisis Text Line.   Consider saving these numbers in your phone.  Go to [email or web address] for more information or to chat online.  Call your doctor  "now or seek immediate medical care if:    You are having withdrawal symptoms. These may include nausea or vomiting, sweating, shakiness, and anxiety.   Watch closely for changes in your health, and be sure to contact your doctor if:    You have a relapse.     You need more help or support to stop.   Where can you learn more?  Go to https://www.[employer].net/patiented  Enter H573 in the search box to learn more about \"Substance Use Disorder: Care Instructions.\"  Current as of: November 15, 2023               Content Version: 14.0    6998-6330 [employer].   Care instructions adapted under license by your healthcare professional. If you have questions about a medical condition or this instruction, always ask your healthcare professional. [employer] disclaims any warranty or liability for your use of this information.      "

## 2024-04-11 NOTE — PROGRESS NOTES
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    Weight as of this encounter: 109.8 kg (242 lb).   Weight management plan: Discussed healthy diet and exercise guidelines    Counseling  Appropriate preventive services were discussed with this patient, including applicable screening as appropriate for fall prevention, nutrition, physical activity, Tobacco-use cessation, weight loss and cognition.  Checklist reviewing preventive services available has been given to the patient.  Reviewed patient's diet, addressing concerns and/or questions.   Patient is at risk for social isolation and has been provided with information about the benefit of social connection.           Marleny Donis is a 49 year old, presenting for the following:  Physical        4/11/2024     2:19 PM   Additional Questions   Roomed by [facility] Care Directive  Patient does not have a Health Care Directive or Living Will: Discussed advance care planning with patient; information given to patient to review.    HPI  Pt here for a Physical     Pt is seeing endocrine for MEN 1 " -notes reviewed   Has follow up MRI scheduled        2024   General Health   How would you rate your overall physical health? (!) FAIR   Feel stress (tense, anxious, or unable to sleep) Rather much   (!) STRESS CONCERN      2024   Nutrition   Three or more servings of calcium each day? (!) I DON'T KNOW   Diet: Regular (no restrictions)   How many servings of fruit and vegetables per day? (!) 2-3   How many sweetened beverages each day? 0-1         2024   Exercise   Days per week of moderate/strenous exercise 4 days   Average minutes spent exercising at this level 50 min         2024   Social Factors   Frequency of gathering with friends or relatives Never   Worry food won't last until get money to buy more No   Food not last or not have enough money for food? No   Do you have housing?  Yes   Are you worried about losing your housing? No   Lack of transportation? No   Unable to get utilities (heat,electricity)? No   (!) SOCIAL CONNECTIONS CONCERN      2024   Fall Risk   Fallen 2 or more times in the past year? No   Trouble with walking or balance? No          2024   Dental   Dentist two times every year? Yes         2024   TB Screening   Were you born outside of the [place]? No         Today's PHQ-2 Score:       2024    11:19 AM   PHQ-2 (  [employer])   Q1: Little interest or pleasure in doing things 1   Q2: Feeling down, depressed or hopeless 0   PHQ-2 Score 1   Q1: Little interest or pleasure in doing things Several days   Q2: Feeling down, depressed or hopeless Not at all   PHQ-2 Score 1           2024   Substance Use   Alcohol more than 3/day or more than 7/wk No   Do you use any other substances recreationally? (!) ALCOHOL     Social History     Tobacco Use    Smoking status: Former     Current packs/day: 0.00     Average packs/day: 0.5 packs/day for 3.0 years (1.5 ttl pk-yrs)     Types: Cigarettes     Start date: 2002     Quit date: 2005     Years since quittin.2     Smokeless tobacco: Never   Vaping Use    Vaping status: Never Used   Substance Use Topics    Alcohol use: Yes     Alcohol/week: 8.3 standard drinks of alcohol     Comment: 2 drinks a week    Drug use: No           2024   STI Screening   New sexual partner(s) since last STI/HIV test? No   ASCVD Risk   The 10-year ASCVD risk score (Ellen JANE, et al., 2019) is: 4.1%    Values used to calculate the score:      Age: 49 years      Sex: Male      Is Non- : No      Diabetic: No      Tobacco smoker: No      Systolic Blood Pressure: 130 mmHg      Is BP treated: No      HDL Cholesterol: 51 mg/dL      Total Cholesterol: 226 mg/dL    Fracture Risk Assessment Tool  Link to Frax Calculator  Use the information below to complete the Frax calculator  : 1975  Sex: male  Weight (kg): 109.8 kg (actual weight)  Height (cm): 185.9 cm  Previous Fragility Fracture:  No  History of parent with fractured hip:  No  Current Smoking:  No  Patient has been on glucocorticoids for more than 3 months (5mg/day or more): No  Rheumatoid Arthritis on Problem List:  No  Secondary Osteoporosis on Problem List:  No  Consumes 3 or more units of alcohol per day: No  Femoral Neck BMD (g/cm2)           Reviewed and updated as needed this visit by Provider                    Past Medical History:   Diagnosis Date    Facial nerve palsy     LT congenital     Hyperlipidemia LDL goal < 160 2012    Kidney stone 2021    MEN1 (multiple endocrine neoplasia) (H)     Nephrolithiasis     has had it 3 times-last one was in 2016    New onset atrial fibrillation (H) 2021    Varicocele     LT     Past Surgical History:   Procedure Laterality Date    COLONOSCOPY N/A 2022    Procedure: COLONOSCOPY, FLEXIBLE, WITH LESION REMOVAL USING SNARE;  Surgeon: Mukund Hayse MD;  Location: [facility] OR    COLONOSCOPY WITH CO2 INSUFFLATION N/A 2022    Procedure: COLONOSCOPY, WITH CO2 INSUFFLATION;  Surgeon:  Mukund Hayes MD;  Location: [facility] OR    ENDOSCOPIC SINUS SURGERY  1/24/2013    Procedure: ENDOSCOPIC SINUS SURGERY;  Bilateral total ethmoidectomy, bilateral endoscopic maxillary antrostomy;  Surgeon: Han Toledo MD;  Location: [facility] OR    ENT SURGERY  1/24/2013    Surgery to correct sinus infection    MOUTH SURGERY  6/2012    Lula Teeth Removed    VASECTOMY       OB History   No obstetric history on file.     Lab work is in process  Labs reviewed in EPIC  BP Readings from Last 3 Encounters:   04/11/24 130/87   11/15/23 (!) 136/93   10/10/23 (!) 142/92    Wt Readings from Last 3 Encounters:   04/11/24 109.8 kg (242 lb)   11/15/23 108 kg (238 lb)   10/10/23 111.9 kg (246 lb 9.6 oz)                  Patient Active Problem List   Diagnosis    Hyperlipidemia with target LDL less than 160    History of sinus surgery    PAF (paroxysmal atrial fibrillation) (H)    Family history of colonic polyps    Anxiety    Class 1 obesity due to excess calories without serious comorbidity with body mass index (BMI) of 30.0 to 30.9 in adult    ALAYNA (obstructive sleep apnea)    Monoallelic mutation of MEN1 gene    High serum glucagon    Pituitary lesion (H24)    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)     Past Surgical History:   Procedure Laterality Date    COLONOSCOPY N/A 6/13/2022    Procedure: COLONOSCOPY, FLEXIBLE, WITH LESION REMOVAL USING SNARE;  Surgeon: Mukund Hayes MD;  Location: [facility]    COLONOSCOPY WITH CO2 INSUFFLATION N/A 6/13/2022    Procedure: COLONOSCOPY, WITH CO2 INSUFFLATION;  Surgeon: Mukund Hayes MD;  Location: [facility]    ENDOSCOPIC SINUS SURGERY  1/24/2013    Procedure: ENDOSCOPIC SINUS SURGERY;  Bilateral total ethmoidectomy, bilateral endoscopic maxillary antrostomy;  Surgeon: Han Toledo MD;  Location: [facility]    ENT SURGERY  1/24/2013    Surgery to correct sinus infection    MOUTH SURGERY  6/2012    Lula Teeth Removed    VASECTOMY         Social History      Tobacco Use    Smoking status: Former     Current packs/day: 0.00     Average packs/day: 0.5 packs/day for 3.0 years (1.5 ttl pk-yrs)     Types: Cigarettes     Start date: 2002     Quit date: 2005     Years since quittin.2    Smokeless tobacco: Never   Substance Use Topics    Alcohol use: Yes     Alcohol/week: 8.3 standard drinks of alcohol     Comment: 2 drinks a week     Family History   Problem Relation Age of Onset    Allergies Mother     Arthritis Mother     Breast Cancer Mother 70    Diabetes Mother     Obesity Mother     Pancreatic Cancer Mother 80    Endometrial Cancer Mother 82    C.A.D. Father 82        MI    Prostate Cancer Father 77    Kidney Cancer Father 83    Hypertension Father     Skin Cancer Father 74    Cancer Father 85        urethra; mets to liver,  at 87    Obesity Sister     Multiple Endocrine Neoplasia Type 1 Sister     Stomach Cancer Sister         3 occurrences    Basal cell carcinoma Sister 63    Bladder Cancer Sister 44        recurrences at 51 and 60    Breast Cancer Sister 63    Squamous cell carcinoma Sister 68    Colon Polyps Sister     Obesity Sister     Hypercalcemia Sister     Thyroid Disease Sister     Colon Polyps Sister         2-5    Anxiety Disorder Sister     Obesity Sister     Obesity Brother     Diabetes Brother     Colon Polyps Brother         10-11    Basal cell carcinoma Brother 64    Breast Cancer Maternal Grandmother 92         at 99    Brain Cancer Maternal Uncle 72         shortly thereafter    Colon Cancer No family hx of          Current Outpatient Medications   Medication Sig Dispense Refill    co-enzyme Q-10 100 MG CAPS capsule       fish oil-omega-3 fatty acids 1000 MG capsule       Multiple Vitamin (MULTIVITAMINS PO) Take  by mouth.       Allergies   Allergen Reactions    Nickel Itching and Rash     Recent Labs   Lab Test 11/15/23  1522 23  1223 22  1231 22  1706 21  1045 21  1307 21  1241   A1C  "5.0  --   --  5.1  --   --   --    LDL  --  156* 122*  --   --   --  177*   HDL  --  51 50  --   --   --  63   TRIG  --  93 127  --   --   --  94   ALT  --   --   --  40  --   --   --    CR  --   --   --  1.18 1.03 1.07 1.05   GFRESTIMATED  --   --   --  77 86 82 84   GFRESTBLACK  --   --   --   --   --  >90 >90   POTASSIUM  --   --   --  4.2 4.3 4.4 4.3   TSH 0.62  --   --  0.55  --  0.87 0.81          Review of Systems  CONSTITUTIONAL: NEGATIVE for fever, chills, change in weight  INTEGUMENTARY/SKIN: NEGATIVE for worrisome rashes, moles or lesions  EYES: NEGATIVE for vision changes or irritation  ENT/MOUTH: NEGATIVE for ear, mouth and throat problems  RESP: NEGATIVE for significant cough or SOB  BREAST: NEGATIVE for masses, tenderness or discharge  CV: NEGATIVE for chest pain, palpitations or peripheral edema  GI: NEGATIVE for nausea, abdominal pain, heartburn, or change in bowel habits  : NEGATIVE for frequency, dysuria, or hematuria  MUSCULOSKELETAL: NEGATIVE for significant arthralgias or myalgia  NEURO: NEGATIVE for weakness, dizziness or paresthesias  ENDOCRINE: NEGATIVE for temperature intolerance, skin/hair changes  HEME: NEGATIVE for bleeding problems  PSYCHIATRIC: NEGATIVE for changes in mood or affect     Objective    Exam  /87   Pulse 80   Temp 97.5  F (36.4  C) (Temporal)   Resp 16   Ht 1.859 m (6' 1.19\")   Wt 109.8 kg (242 lb)   SpO2 97%   BMI 31.76 kg/m     Estimated body mass index is 31.76 kg/m  as calculated from the following:    Height as of this encounter: 1.859 m (6' 1.19\").    Weight as of this encounter: 109.8 kg (242 lb).    Physical Exam  GENERAL: alert and no distress  EYES: Eyes grossly normal to inspection, PERRL and conjunctivae and sclerae normal  HENT: ear canals and TM's normal, nose and mouth without ulcers or lesions  NECK: no adenopathy, no asymmetry, masses, or scars  RESP: lungs clear to auscultation - no rales, rhonchi or wheezes  CV: regular rate and rhythm, " normal S1 S2, no S3 or S4, no murmur, click or rub, no peripheral edema  ABDOMEN: soft, nontender, no hepatosplenomegaly, no masses and bowel sounds normal  MS: no gross musculoskeletal defects noted, no edema  SKIN: no suspicious lesions or rashes  NEURO: Normal strength and tone, mentation intact and speech normal  PSYCH: mentation appears normal, affect normal/bright        Signed Electronically by: Claudia Solo MD

## 2024-04-12 LAB
CHOLEST SERPL-MCNC: 242 MG/DL
FASTING STATUS PATIENT QL REPORTED: NO
HDLC SERPL-MCNC: 54 MG/DL
LDLC SERPL CALC-MCNC: 159 MG/DL
NONHDLC SERPL-MCNC: 188 MG/DL
PSA SERPL DL<=0.01 NG/ML-MCNC: 0.43 NG/ML (ref 0–3.5)
TRIGL SERPL-MCNC: 147 MG/DL

## 2024-04-29 ENCOUNTER — ANCILLARY PROCEDURE (OUTPATIENT)
Dept: MRI IMAGING | Facility: CLINIC | Age: 49
End: 2024-04-29
Attending: INTERNAL MEDICINE
Payer: COMMERCIAL

## 2024-04-29 DIAGNOSIS — E23.7 PITUITARY LESION (H): ICD-10-CM

## 2024-04-29 PROCEDURE — A9585 GADOBUTROL INJECTION: HCPCS | Performed by: RADIOLOGY

## 2024-04-29 PROCEDURE — 70553 MRI BRAIN STEM W/O & W/DYE: CPT | Mod: TC | Performed by: RADIOLOGY

## 2024-04-29 RX ORDER — GADOBUTROL 604.72 MG/ML
11 INJECTION INTRAVENOUS ONCE
Status: COMPLETED | OUTPATIENT
Start: 2024-04-29 | End: 2024-04-29

## 2024-04-29 RX ADMIN — GADOBUTROL 11 ML: 604.72 INJECTION INTRAVENOUS at 15:57

## 2024-05-06 ENCOUNTER — LAB (OUTPATIENT)
Dept: LAB | Facility: CLINIC | Age: 49
End: 2024-05-06
Payer: COMMERCIAL

## 2024-05-06 DIAGNOSIS — N20.0 KIDNEY STONE: ICD-10-CM

## 2024-05-06 DIAGNOSIS — R79.89: ICD-10-CM

## 2024-05-06 LAB — CA-I BLD-MCNC: 4.8 MG/DL (ref 4.4–5.2)

## 2024-05-06 PROCEDURE — 82943 ASSAY OF GLUCAGON: CPT | Mod: 90

## 2024-05-06 PROCEDURE — 82330 ASSAY OF CALCIUM: CPT

## 2024-05-06 PROCEDURE — 36415 COLL VENOUS BLD VENIPUNCTURE: CPT

## 2024-05-06 PROCEDURE — 82947 ASSAY GLUCOSE BLOOD QUANT: CPT

## 2024-05-06 PROCEDURE — 83970 ASSAY OF PARATHORMONE: CPT

## 2024-05-06 PROCEDURE — 84100 ASSAY OF PHOSPHORUS: CPT

## 2024-05-06 PROCEDURE — 82310 ASSAY OF CALCIUM: CPT

## 2024-05-06 PROCEDURE — 99000 SPECIMEN HANDLING OFFICE-LAB: CPT

## 2024-05-06 PROCEDURE — 82040 ASSAY OF SERUM ALBUMIN: CPT

## 2024-05-07 LAB
ALBUMIN SERPL BCG-MCNC: 4.5 G/DL (ref 3.5–5.2)
CALCIUM SERPL-MCNC: 9.1 MG/DL (ref 8.6–10)
FASTING STATUS PATIENT QL REPORTED: NORMAL
GLUCOSE SERPL-MCNC: 95 MG/DL (ref 70–99)
PHOSPHATE SERPL-MCNC: 3.2 MG/DL (ref 2.5–4.5)
PTH-INTACT SERPL-MCNC: 43 PG/ML (ref 15–65)

## 2024-05-09 LAB — GLUCAGON SERPL-MCNC: 318 NG/L

## 2024-05-10 ENCOUNTER — OFFICE VISIT (OUTPATIENT)
Dept: INTERNAL MEDICINE | Facility: CLINIC | Age: 49
End: 2024-05-10
Payer: COMMERCIAL

## 2024-05-10 ENCOUNTER — LAB (OUTPATIENT)
Dept: LAB | Facility: CLINIC | Age: 49
End: 2024-05-10
Payer: COMMERCIAL

## 2024-05-10 VITALS
WEIGHT: 241 LBS | BODY MASS INDEX: 31.94 KG/M2 | HEART RATE: 67 BPM | TEMPERATURE: 97.4 F | OXYGEN SATURATION: 97 % | SYSTOLIC BLOOD PRESSURE: 125 MMHG | DIASTOLIC BLOOD PRESSURE: 84 MMHG | HEIGHT: 73 IN

## 2024-05-10 DIAGNOSIS — N20.0 KIDNEY STONE: ICD-10-CM

## 2024-05-10 DIAGNOSIS — R21 RASH AND NONSPECIFIC SKIN ERUPTION: Primary | ICD-10-CM

## 2024-05-10 LAB
COLLECT DURATION TIME UR: 24 H
CREAT 24H UR-MRATE: 2.02 G/SPEC (ref 0.98–2.2)
CREAT UR-MCNC: 56.8 MG/DL
SPECIMEN VOL UR: 3550 ML

## 2024-05-10 PROCEDURE — 82570 ASSAY OF URINE CREATININE: CPT

## 2024-05-10 PROCEDURE — 81050 URINALYSIS VOLUME MEASURE: CPT

## 2024-05-10 PROCEDURE — 81050 URINALYSIS VOLUME MEASURE: CPT | Mod: 59

## 2024-05-10 PROCEDURE — 82340 ASSAY OF CALCIUM IN URINE: CPT

## 2024-05-10 PROCEDURE — 99213 OFFICE O/P EST LOW 20 MIN: CPT

## 2024-05-10 RX ORDER — HYDROCORTISONE 2.5 %
CREAM (GRAM) TOPICAL 2 TIMES DAILY
Qty: 30 G | Refills: 0 | Status: SHIPPED | OUTPATIENT
Start: 2024-05-10

## 2024-05-10 NOTE — PROGRESS NOTES
"  Assessment & Plan     (R21) Rash and nonspecific skin eruption  (primary encounter diagnosis)  Comment: Patient was in the yard doing yard work and later in the day noted itchy rash to hands that started to weep. Discussed that rash looked like contact dermatitis. Discussed hand washing and medication management of symptoms to include oral antihistamine like zyrtec, during the ay and benadryl at night. Provided prescription for hydrocortisone.  Plan: hydrocortisone 2.5 % cream        Prescription sent to pharmacy           Marleny Donis is a 49 year old, presenting for the following health issues:  Derm Problem        5/10/2024     1:15 PM   Additional Questions   Roomed by Sandra Stinson     History of Present Illness       Reason for visit:  Weird small bumps or warts on several fingers  Symptom onset:  1-2 weeks ago  Symptoms include:  Raised bumps. Look like warts. Sometimes have serum in them  Symptom intensity:  Moderate  Symptom progression:  Worsening  Had these symptoms before:  Yes  Has tried/received treatment for these symptoms:  No  What makes it worse:  Contact, pressure  What makes it better:  No    He eats 2-3 servings of fruits and vegetables daily.He consumes 1 sweetened beverage(s) daily.He exercises with enough effort to increase his heart rate 10 to 19 minutes per day.  He exercises with enough effort to increase his heart rate 3 or less days per week.   He is taking medications regularly.           Review of Systems  Constitutional, HEENT, cardiovascular, pulmonary, gi and gu systems are negative, except as otherwise noted.      Objective    /84 (BP Location: Right arm, Patient Position: Sitting, Cuff Size: Adult Large)   Pulse 67   Temp 97.4  F (36.3  C) (Temporal)   Ht 1.854 m (6' 1\")   Wt 109.3 kg (241 lb)   SpO2 97%   BMI 31.80 kg/m    Body mass index is 31.8 kg/m .  Physical Exam  Constitutional:       Appearance: Normal appearance.   HENT:      Nose: Nose normal. " No congestion or rhinorrhea.   Eyes:      General:         Right eye: No discharge.         Left eye: No discharge.      Conjunctiva/sclera: Conjunctivae normal.      Pupils: Pupils are equal, round, and reactive to light.   Pulmonary:      Effort: Pulmonary effort is normal. No respiratory distress.      Breath sounds: No stridor. Wheezing present.   Skin:     Findings: Lesion and rash present.      Comments: Both hands on fingers    Neurological:      General: No focal deficit present.      Mental Status: He is alert and oriented to person, place, and time.   Psychiatric:         Mood and Affect: Mood normal.         Behavior: Behavior normal.         Thought Content: Thought content normal.         Judgment: Judgment normal.            Signed Electronically by: ANGELO Flanagan CNP

## 2024-05-14 NOTE — RESULT ENCOUNTER NOTE
The glucagon remains mildly elevated. The other labs are normal.  We are going to discuss in detail at your upcoming appointment.

## 2024-05-15 ENCOUNTER — OFFICE VISIT (OUTPATIENT)
Dept: ENDOCRINOLOGY | Facility: CLINIC | Age: 49
End: 2024-05-15
Payer: COMMERCIAL

## 2024-05-15 VITALS
RESPIRATION RATE: 16 BRPM | WEIGHT: 237 LBS | HEART RATE: 70 BPM | BODY MASS INDEX: 31.27 KG/M2 | DIASTOLIC BLOOD PRESSURE: 83 MMHG | SYSTOLIC BLOOD PRESSURE: 119 MMHG | OXYGEN SATURATION: 98 %

## 2024-05-15 DIAGNOSIS — R79.89: ICD-10-CM

## 2024-05-15 DIAGNOSIS — E23.7 PITUITARY LESION (H): ICD-10-CM

## 2024-05-15 DIAGNOSIS — E31.21 MEN 1 SYNDROME (H): ICD-10-CM

## 2024-05-15 DIAGNOSIS — N20.0 KIDNEY STONE: ICD-10-CM

## 2024-05-15 DIAGNOSIS — R82.994 HYPERCALCIURIA: ICD-10-CM

## 2024-05-15 PROCEDURE — G2211 COMPLEX E/M VISIT ADD ON: HCPCS | Performed by: INTERNAL MEDICINE

## 2024-05-15 PROCEDURE — 2894A VOIDCORRECT: CPT | Performed by: INTERNAL MEDICINE

## 2024-05-15 PROCEDURE — 99214 OFFICE O/P EST MOD 30 MIN: CPT | Performed by: INTERNAL MEDICINE

## 2024-05-15 NOTE — NURSING NOTE
Gagandeep Oswald's goals for this visit include:   Chief Complaint   Patient presents with    Follow Up     MEN 1  Pituitary lesion       He requests these members of his care team be copied on today's visit information: yes    PCP: Claudia Solo    Referring Provider:  Claudia Solo MD  [address] [facility]  [place] [id number]    /83 (BP Location: Left arm, Patient Position: Sitting, Cuff Size: Adult Large)   Pulse 70   Resp 16   Wt 107.5 kg (237 lb)   SpO2 98%   BMI 31.27 kg/m      Do you need any medication refills at today's visit? None    Anuel Burton, EMT

## 2024-05-15 NOTE — PATIENT INSTRUCTIONS
Welcome to the [facility] Endocr[facility]ology and Diabetes Clinics     Our Endocrinology Clinics are here to provide you with a team-based, collaborative approach in the diagnosis and treatment of patients with diabetes and endocrine disorders. The team is made up of Physicians, Physician Assistants, Certified Diabetes Educators, Registered Nurses, Medical Assistants, Emergency Medical Technicians, and many others, all of whom have the unified goal of providing our patients with high quality care.     Please see below for some helpful tips to best navigate and use the [facility] Endocrinology clinic:     [place] Respect: At [facility], we are committed to a respectful and safe space for all patients, visitors, and staff.  We believe that mutual respect between patients and their care team is the foundation of quality care.  It is our expectation that you will be treated with respect by your care team.  In turn, we ask that all communication with the care team (written and verbal) be respectful and free from profanity, threatening, or abusive language.  Disrespectful communication undermines our therapeutic relationship with you and may result in us being unable to continue to provide your care.    Refills: A provider must see you at least annually to prescribe and refill medications. This is to ensure your safety as well as meet insurance and compliance regulations.    Scheduling: Many of our Providers offer both in-person or video visits. Please call to schedule any needed follow ups as soon as possible because our provider schedules fill up very quickly. Our care team has the right to require an in-person visit when they believe that it is medically necessary. Please remember that for any virtual visits, you must be in the [facility] at the time of the visit, otherwise we are unable to see you and you will need to be rescheduled.    Missed Appointments: If you need to cancel or miss your  scheduled appointment, please call the clinic at [phone] to reschedule.  Please note if you repeatedly miss appointments or repeatedly miss appointments without calling to inform us ahead of time (no-show), the clinic may elect to not allow you to reschedule without speaking to a manager, may require a Partnership In Care Agreement prior to rescheduling, or could result in you no longer being able to receive care from the clinic. Providing the clinic with timely notification if you have to miss an appointment, allows us to better serve the needs of all of our patients.    Primary Care Provider: Our Endocrinologists are Specialists in their field. We expect you to have a Primary Care Provider established to handle any needs outside of your diabetes and endocrine care.  We would be happy to assist you find a Primary Care Provider, if you do not have one.    [employer]: [employer] is a wonderful resource that allows you access to your Care Team via online or the mi. Please ask a member of the team if you would like help creating an account. Please note that it may take up to 2 business days for a response. [employer] messages are not reviewed on weekends or after business hours.  Emergent or urgent care needs should never be communicated via [employer].  If you experience a medical emergency call 911 or go to the nearest emergency room.    Labs: It is recommended that you stay within the [facility] for labs but you are welcome to obtain ordered labs (with some exceptions) from any location of your choice as long as they are able to complete and process the needed labs. If you need us to fax orders to your preferred lab, please provide us the name and fax number of the lab you would like to go to so we can fax the orders. If your labs are drawn outside of the [facility], please have them fax the results to [phone] ([place]) or [phone] ([place]) or via [facility]. It is your  responsibility to ensure that outside lab results are sent to us.    We look forward to working with you. Please do not hesitate to reach out with any questions.    Thank you,    The Endocrine Team    [facility] Address:   [place] Address:     [address] [place] [address]    Phone: [phone]  Fax: [phone] [address] [place] [address]    Phone: [phone]  Fax: [phone]     [facility] Cost Estimate Phone Number: [phone]    General Lab and Imaging Scheduling Phone Number: [phone]

## 2024-05-15 NOTE — PROGRESS NOTES
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    MEN1   Completed biochemical and imaging screening tests revealed a mildly elevated glucagon levels, elevated nonfasting pancreatic polypeptide and a questionable pituitary lesion, stable on MRI from November 2023 to April 2024.  Screening for diabetes was negative.  On clinical exam, the patient has 2 areas of dark erythema on the back, of which he was not aware of.  I took pictures of the lesions with his phone I advised him to contact me in 1 or 2 weeks, if the lesions do not disappear.  For now, the plan is to continue to monitor.  He plans to schedule an appointment in the cancer risk management clinic.  He is going to be due at that time for an abdominal MRI.    2.  Hypercalciuria associated with normal phosphorus, serum calcium and PTH levels.  The patient has a history of kidney stones.  We are going to contact the lab to check the calcium level for the recent urine collection.  If the hypercalciuria is persistent, the plan is to consider treatment with hydrochlorothiazide, at 25 mg daily.  I counseled the patient on the administration of this medication and side effects: Hypotension, polyuria.     No orders of the defined types were placed in this encounter.    RTC 4 months.  Labs to be ordered for the follow-up appointment pending the urine collection.  =======================================================  The patient is seen in consultation at the request of Dr. Joceline Dior.     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    Patient  "has a very strong family history of cancers on both sides. His sister was found to have a MEN1 mutation earlier this year in the context of recurring gastric cancer, hypercalcemia, thyroid disease. Patient saw genetic counselor 5/22/2023 due to family history with syndromic pattern. 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   The patient established care in the cancer risk management clinic in October 2023.  In August 2023 he was evaluated by dermatology.    He has a history of kidney stones, estimates \"one every year and a half\" for which he saw urology. Determined to be calcium oxalate stones. Historically his calcium has always been normal on labs.  The 24-hour urinary calcium from November 2023 was elevated at 0.54 g.  Labs thus far have included normal PRL, normal IGF-1, normal serum calcium and intact PTH. Gastrointestinal labs were collected while non-fasting and revealed an elevated glucagon at 503. On subsequent lab work, the glucagon level has remained elevated, with the most recent value of 318.     MR Pituitary on 11/1/2023, Subtle 8 x 5 x 4 mm area of differential signal within the posterior pituitary gland could reflect a small adenoma, but is not entirely specific.  The findings have remained unchanged on the follow-up MRI from 4/29/2024.  I reviewed the pituitary MRI images.  The chest MRI from December 2023 was negative for thymoma.  CT Abdomen/Pelvis 8/11/2023, normal apart from 2 mm obstructing calculus in the right proximal ureter with mild upstream hydroureteronephrosis, nonobstructing bilateral nephrolithiasis.    Pertinent labs reviewed:  10/20/23   chromogranin A normal at 57, glucagon 503 (<208), glucose 102 (nonfasting)   pancreatic polypeptide 1040 (0-435)  prolactin 9, IGF1 normal at 199   calcium 9.2, PTH 43    gastrin < 10     11/10/23   Fasting glucagon 256  fasting glucose 101 (pt had " some peppermints just prior to having the blood drawn)    1/14/22   A1c 5.1, TSH 0.55     5/6/2024  PTH 43, phosphorus 3.2, albumin 4.5, calcium 9.1, ionized calcium normal at 4.8.  24-hour urinary creatinine 2.02 g, urinary calcium pending.  Urinary volume 3.55 L.    ROS  Systemic symptoms: weight stable   Eye symptoms: No eye symptoms.  Otolaryngeal symptoms: No otolaryngeal symptoms.   Breast symptoms: No breast symptoms.  Cardiovascular symptoms: palpitations lasting less than 10 seconds, skipped beats or pounding heart beats, not daily and generally related to anxiety    Pulmonary symptoms: No pulmonary symptoms.  Gastrointestinal symptoms: No gastrointestinal symptoms. No nausea, no vomiting, no abd pain, no constipation or diarrhea   Genitourinary symptoms: No genitourinary symptoms.  Endocrine symptoms: longstanding heat intolerance, unchanged.  No increased sweating.  No enlargement of the hands or feet.  No breast tenderness.  Hematologic symptoms: No hematologic symptoms.  Musculoskeletal symptoms: No musculoskeletal symptoms unless related to physical activity.  Longstanding tremor of the hands.  Neurological symptoms: neck tension. No neurological symptoms. Right big toe numbness present intermittently. No dizziness.   Psychological symptoms: longstanding anxiety; in therapy   Skin symptoms: No skin lesions     Past Medical History   Past Medical History:   Diagnosis Date    Facial nerve palsy     LT congenital     Hyperlipidemia LDL goal < 160 05/07/2012    Kidney stone 09/20/2021    MEN1 (multiple endocrine neoplasia) (H)     Nephrolithiasis     has had it 3 times-last one was in 2016    New onset atrial fibrillation (H) 05/17/2021    Varicocele     LT   Colon polyps  Seborrheic keratosis, lentigo, angioma, benign nevi (derm exam 8/23)    Past Surgical History   Past Surgical History:   Procedure Laterality Date    COLONOSCOPY N/A 6/13/2022    Procedure: COLONOSCOPY, FLEXIBLE, WITH LESION REMOVAL  USING SNARE;  Surgeon: Mukund Hayes MD;  Location: [facility]    COLONOSCOPY WITH CO2 INSUFFLATION N/A 2022    Procedure: COLONOSCOPY, WITH CO2 INSUFFLATION;  Surgeon: Mukund Hayes MD;  Location: [facility]    ENDOSCOPIC SINUS SURGERY  2013    Procedure: ENDOSCOPIC SINUS SURGERY;  Bilateral total ethmoidectomy, bilateral endoscopic maxillary antrostomy;  Surgeon: Han Toledo MD;  Location: [facility]    ENT SURGERY  2013    Surgery to correct sinus infection    MOUTH SURGERY  2012    [place] Teeth Removed    VASECTOMY         Current Medications    Current Outpatient Medications:     co-enzyme Q-10 100 MG CAPS capsule, , Disp: , Rfl:     fish oil-omega-3 fatty acids 1000 MG capsule, , Disp: , Rfl:     hydrocortisone 2.5 % cream, Apply topically 2 times daily, Disp: 30 g, Rfl: 0    Multiple Vitamin (MULTIVITAMINS PO), Take  by mouth., Disp: , Rfl:     Family History   Problem Relation Age of Onset    Allergies Mother     Arthritis Mother     Breast Cancer Mother 70    Diabetes Mother     Obesity Mother     Pancreatic Cancer Mother 80    Endometrial Cancer Mother 82    C.A.D. Father 82        MI    Prostate Cancer Father 77    Kidney Cancer Father 83    Hypertension Father     Skin Cancer Father 74    Cancer Father 85        urethra; mets to liver,  at 87    Obesity Sister     Multiple Endocrine Neoplasia Type 1 Sister     Stomach Cancer Sister         3 occurrences    Basal cell carcinoma Sister 63    Bladder Cancer Sister 44        recurrences at 51 and 60    Breast Cancer Sister 63    Squamous cell carcinoma Sister 68    Colon Polyps Sister     Obesity Sister     Hypercalcemia Sister     Thyroid Disease Sister     Colon Polyps Sister         2-5    Anxiety Disorder Sister     Obesity Sister     Obesity Brother     Diabetes Brother     Colon Polyps Brother         10-11    Basal cell carcinoma Brother 64    Breast Cancer Maternal Grandmother 92         at 99    Brain Cancer  Maternal Uncle 72         shortly thereafter    Colon Cancer No family hx of    Brother - one episode of kidney stones. Sister with thyroid surgery for benign lesion.   No F/H os osteoporosis or hip fractures.     Social History  Social History     Socioeconomic History    Marital status:     Number of children: 1    Years of education: 16   Occupational History    Occupation: [employer]      Employer: [employer]   Tobacco Use    Smoking status: Former     Packs/day: 0.50     Years: 3.00     Additional pack years: 0.00     Total pack years: 1.50     Types: Cigarettes     Quit date: 2005     Years since quittin.8    Smokeless tobacco: Never   Vaping Use    Vaping Use: Never used   Substance and Sexual Activity    Alcohol use: Yes     Alcohol/week: 8.3 standard drinks of alcohol     Comment: 6 drinks a week    Drug use: No    Sexual activity: Yes     Partners: Female     Birth control/protection: Male Surgical   Other Topics Concern    Parent/sibling w/ CABG, MI or angioplasty before 65F 55M? No       Vital Signs     Previous Weights:    Wt Readings from Last 10 Encounters:   05/15/24 107.5 kg (237 lb)   05/10/24 109.3 kg (241 lb)   24 109.8 kg (242 lb)   11/15/23 108 kg (238 lb)   10/10/23 111.9 kg (246 lb 9.6 oz)   23 107 kg (236 lb)   23 102.1 kg (225 lb)   23 107.9 kg (237 lb 12.8 oz)   22 102.1 kg (225 lb)   22 99.8 kg (220 lb)        /83 (BP Location: Left arm, Patient Position: Sitting, Cuff Size: Adult Large)   Pulse 70   Resp 16   Wt 107.5 kg (237 lb)   SpO2 98%   BMI 31.27 kg/m      Physical Exam  General Appearance: Tall, pleasant, no distress noted  Eyes: conjutivae and extra-ocular motions are normal, pupils round and reactive to light, no lid lag, no stare    HEENT: no JVD, no bruits, no thyromegaly, no palpable nodules   Cardiovascular: regular rhythm, no murmurs, distal pulse palpable, no edema  Respiratory: chest clear, no rales, no rhonchi    Gastrointestinal: abdomen soft, non-tender, non-distended, normal bowel sounds, no organomegaly  Musculoskeletal: normal tone and strength  Psychological: affect and judgment normal  Skin: 2 areas of hyperpigmented (burgundy looking) skin on the posterior left back, minimal pale stretch marks, sweaty.  Neurological: reflexes normal and symmetric, very fine tremor.      Lab Results  I reviewed prior lab results documented in Epic.    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 Specifically his mutation is called c.652C>T (also known as p.[id number]) identified using a custom panel (a combination of the Common hereditary cancers panel plus renal/urinary tract cancers panels) from [employer]  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 This variant is called c.1561A>T (also known as p.[id number]). Given the uncertain significance of this result, medical management decisions should NOT be made based on this test result alone.    The longitudinal plan of care for the diagnosis(es)/condition(s) as documented were addressed during this visit. Due to the added complexity in care, I will continue to support Gagandeep in the subsequent management and with ongoing continuity of care.

## 2024-05-15 NOTE — LETTER
5/15/2024         RE: Gagandeep Oswald  [address] [place] [address]        Dear Colleague,    Thank you for referring your patient, Gagandeep Oswald, to the [facility]. Please see a copy of my visit note below.    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    MEN1   Completed biochemical and imaging screening tests revealed a mildly elevated glucagon levels, elevated nonfasting pancreatic polypeptide and a questionable pituitary lesion, stable on MRI from November 2023 to April 2024.  Screening for diabetes was negative.  On clinical exam, the patient has 2 areas of dark erythema on the back, of which he was not aware of.  I took pictures of the lesions with his phone I advised him to contact me in 1 or 2 weeks, if the lesions do not disappear.  For now, the plan is to continue to monitor.  He plans to schedule an appointment in the cancer risk management clinic.  He is going to be due at that time for an abdominal MRI.    2.  Hypercalciuria associated with normal phosphorus, serum calcium and PTH levels.  The patient has a history of kidney stones.  We are going to contact the lab to check the calcium level for the recent urine collection.  If the hypercalciuria is persistent, the plan is to consider treatment with hydrochlorothiazide, at 25 mg daily.  I counseled the patient on the administration of this medication and side effects: Hypotension, polyuria.     No orders of the defined types were placed in this encounter.    RTC 4 months.  Labs to be ordered for the follow-up appointment pending the urine collection.  =======================================================  The patient is seen in consultation at the request of   "Joceline Dior.     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    Patient has a very strong family history of cancers on both sides. His sister was found to have a MEN1 mutation earlier this year in the context of recurring gastric cancer, hypercalcemia, thyroid disease. Patient saw genetic counselor 5/22/2023 due to family history with syndromic pattern. 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   The patient established care in the cancer risk management clinic in October 2023.  In August 2023 he was evaluated by dermatology.    He has a history of kidney stones, estimates \"one every year and a half\" for which he saw urology. Determined to be calcium oxalate stones. Historically his calcium has always been normal on labs.  The 24-hour urinary calcium from November 2023 was elevated at 0.54 g.  Labs thus far have included normal PRL, normal IGF-1, normal serum calcium and intact PTH. Gastrointestinal labs were collected while non-fasting and revealed an elevated glucagon at 503. On subsequent lab work, the glucagon level has remained elevated, with the most recent value of 318.     MR Pituitary on 11/1/2023, Subtle 8 x 5 x 4 mm area of differential signal within the posterior pituitary gland could reflect a small adenoma, but is not entirely specific.  The findings have remained unchanged on the follow-up MRI from 4/29/2024.  I reviewed the pituitary MRI images.  The chest MRI from December 2023 was negative for thymoma.  CT Abdomen/Pelvis 8/11/2023, normal apart from 2 mm obstructing calculus in the right proximal ureter with mild upstream hydroureteronephrosis, nonobstructing bilateral nephrolithiasis.    Pertinent labs " reviewed:  10/20/23   chromogranin A normal at 57, glucagon 503 (<208), glucose 102 (nonfasting)   pancreatic polypeptide 1040 (0-435)  prolactin 9, IGF1 normal at 199   calcium 9.2, PTH 43    gastrin < 10     11/10/23   Fasting glucagon 256  fasting glucose 101 (pt had some peppermints just prior to having the blood drawn)    1/14/22   A1c 5.1, TSH 0.55     5/6/2024  PTH 43, phosphorus 3.2, albumin 4.5, calcium 9.1, ionized calcium normal at 4.8.  24-hour urinary creatinine 2.02 g, urinary calcium pending.  Urinary volume 3.55 L.    ROS  Systemic symptoms: weight stable   Eye symptoms: No eye symptoms.  Otolaryngeal symptoms: No otolaryngeal symptoms.   Breast symptoms: No breast symptoms.  Cardiovascular symptoms: palpitations lasting less than 10 seconds, skipped beats or pounding heart beats, not daily and generally related to anxiety    Pulmonary symptoms: No pulmonary symptoms.  Gastrointestinal symptoms: No gastrointestinal symptoms. No nausea, no vomiting, no abd pain, no constipation or diarrhea   Genitourinary symptoms: No genitourinary symptoms.  Endocrine symptoms: longstanding heat intolerance, unchanged.  No increased sweating.  No enlargement of the hands or feet.  No breast tenderness.  Hematologic symptoms: No hematologic symptoms.  Musculoskeletal symptoms: No musculoskeletal symptoms unless related to physical activity.  Longstanding tremor of the hands.  Neurological symptoms: neck tension. No neurological symptoms. Right big toe numbness present intermittently. No dizziness.   Psychological symptoms: longstanding anxiety; in therapy   Skin symptoms: No skin lesions     Past Medical History   Past Medical History:   Diagnosis Date     Facial nerve palsy     LT congenital      Hyperlipidemia LDL goal < 160 05/07/2012     Kidney stone 09/20/2021     MEN1 (multiple endocrine neoplasia) (H)      Nephrolithiasis     has had it 3 times-last one was in 2016     New onset atrial fibrillation (H)  2021     Varicocele     LT   Colon polyps  Seborrheic keratosis, lentigo, angioma, benign nevi (derm exam )    Past Surgical History   Past Surgical History:   Procedure Laterality Date     COLONOSCOPY N/A 2022    Procedure: COLONOSCOPY, FLEXIBLE, WITH LESION REMOVAL USING SNARE;  Surgeon: Mukund Hayes MD;  Location: [facility]     COLONOSCOPY WITH CO2 INSUFFLATION N/A 2022    Procedure: COLONOSCOPY, WITH CO2 INSUFFLATION;  Surgeon: Mukund Hayes MD;  Location: [facility]     ENDOSCOPIC SINUS SURGERY  2013    Procedure: ENDOSCOPIC SINUS SURGERY;  Bilateral total ethmoidectomy, bilateral endoscopic maxillary antrostomy;  Surgeon: Han Toledo MD;  Location: [facility]     ENT SURGERY  2013    Surgery to correct sinus infection     MOUTH SURGERY  2012    [place] Teeth Removed     VASECTOMY         Current Medications    Current Outpatient Medications:      co-enzyme Q-10 100 MG CAPS capsule, , Disp: , Rfl:      fish oil-omega-3 fatty acids 1000 MG capsule, , Disp: , Rfl:      hydrocortisone 2.5 % cream, Apply topically 2 times daily, Disp: 30 g, Rfl: 0     Multiple Vitamin (MULTIVITAMINS PO), Take  by mouth., Disp: , Rfl:     Family History   Problem Relation Age of Onset     Allergies Mother      Arthritis Mother      Breast Cancer Mother 70     Diabetes Mother      Obesity Mother      Pancreatic Cancer Mother 80     Endometrial Cancer Mother 82     C.A.D. Father 82        MI     Prostate Cancer Father 77     Kidney Cancer Father 83     Hypertension Father      Skin Cancer Father 74     Cancer Father 85        urethra; mets to liver,  at 87     Obesity Sister      Multiple Endocrine Neoplasia Type 1 Sister      Stomach Cancer Sister         3 occurrences     Basal cell carcinoma Sister 63     Bladder Cancer Sister 44        recurrences at 51 and 60     Breast Cancer Sister 63     Squamous cell carcinoma Sister 68     Colon Polyps Sister      Obesity Sister       Hypercalcemia Sister      Thyroid Disease Sister      Colon Polyps Sister         2-5     Anxiety Disorder Sister      Obesity Sister      Obesity Brother      Diabetes Brother      Colon Polyps Brother         10-11     Basal cell carcinoma Brother 64     Breast Cancer Maternal Grandmother 92         at 99     Brain Cancer Maternal Uncle 72         shortly thereafter     Colon Cancer No family hx of    Brother - one episode of kidney stones. Sister with thyroid surgery for benign lesion.   No F/H os osteoporosis or hip fractures.     Social History  Social History     Socioeconomic History     Marital status:      Number of children: 1     Years of education: 16   Occupational History     Occupation: [employer]      Employer: [employer]   Tobacco Use     Smoking status: Former     Packs/day: 0.50     Years: 3.00     Additional pack years: 0.00     Total pack years: 1.50     Types: Cigarettes     Quit date: 2005     Years since quittin.8     Smokeless tobacco: Never   Vaping Use     Vaping Use: Never used   Substance and Sexual Activity     Alcohol use: Yes     Alcohol/week: 8.3 standard drinks of alcohol     Comment: 6 drinks a week     Drug use: No     Sexual activity: Yes     Partners: Female     Birth control/protection: Male Surgical   Other Topics Concern     Parent/sibling w/ CABG, MI or angioplasty before 65F 55M? No       Vital Signs     Previous Weights:    Wt Readings from Last 10 Encounters:   05/15/24 107.5 kg (237 lb)   05/10/24 109.3 kg (241 lb)   24 109.8 kg (242 lb)   11/15/23 108 kg (238 lb)   10/10/23 111.9 kg (246 lb 9.6 oz)   23 107 kg (236 lb)   23 102.1 kg (225 lb)   23 107.9 kg (237 lb 12.8 oz)   22 102.1 kg (225 lb)   22 99.8 kg (220 lb)        /83 (BP Location: Left arm, Patient Position: Sitting, Cuff Size: Adult Large)   Pulse 70   Resp 16   Wt 107.5 kg (237 lb)   SpO2 98%   BMI 31.27 kg/m      Physical Exam  General  Appearance: Tall, pleasant, no distress noted  Eyes: conjutivae and extra-ocular motions are normal, pupils round and reactive to light, no lid lag, no stare    HEENT: no JVD, no bruits, no thyromegaly, no palpable nodules   Cardiovascular: regular rhythm, no murmurs, distal pulse palpable, no edema  Respiratory: chest clear, no rales, no rhonchi   Gastrointestinal: abdomen soft, non-tender, non-distended, normal bowel sounds, no organomegaly  Musculoskeletal: normal tone and strength  Psychological: affect and judgment normal  Skin: 2 areas of hyperpigmented (burgundy looking) skin on the posterior left back, minimal pale stretch marks, sweaty.  Neurological: reflexes normal and symmetric, very fine tremor.      Lab Results  I reviewed prior lab results documented in Epic.    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 Specifically his mutation is called c.652C>T (also known as p.[id number]) identified using a custom panel (a combination of the Common hereditary cancers panel plus renal/urinary tract cancers panels) from [employer]..  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 This variant is called c.1561A>T (also known as p.[id number]). Given the uncertain significance of this result, medical management decisions should NOT be made based on this test result alone.    The longitudinal plan of care for the diagnosis(es)/condition(s) as documented were addressed during this visit. Due to the added complexity in care, I will continue to support Gagandeep in the subsequent management and with ongoing continuity of care.      Again, thank you for allowing me to participate in the care of your patient.        Sincerely,        Danielle Burns MD

## 2024-05-16 LAB
CALCIUM 24H UR-MRATE: 0.6 G/SPEC (ref 0.1–0.3)
CALCIUM UR-MCNC: 17 MG/DL
COLLECT DURATION TIME UR: 24 H
SPECIMEN VOL UR: 3550 ML

## 2024-05-17 ENCOUNTER — TELEPHONE (OUTPATIENT)
Dept: ENDOCRINOLOGY | Facility: CLINIC | Age: 49
End: 2024-05-17
Payer: COMMERCIAL

## 2024-05-17 RX ORDER — HYDROCHLOROTHIAZIDE 25 MG/1
25 TABLET ORAL DAILY
Qty: 90 TABLET | Refills: 2 | Status: SHIPPED | OUTPATIENT
Start: 2024-05-17

## 2024-05-17 NOTE — TELEPHONE ENCOUNTER
5/17 Called patient and left voicemail. Provided patient with [phone] as a call back number.     Per Dr. Burns- Schedule MRI and fasting labs prior to August 2024 visit.     [facility]   Dermatology, Surgery, Urology  [facility]

## 2024-05-17 NOTE — RESULT ENCOUNTER NOTE
Masood Donis!     The urinary calcium is persistently elevated.  I placed a prescription for hydrochlorothiazide, 25 mg daily. You should have a urinary calcium rechecked in 1 month.  I also recommend to schedule an appointment with Dr. Benz at the , to evaluate comprehensively other risk factors for kidney stones and have them addressed.  Prior to your August f/up apt, please have an abd MRI and blood drawn, a couple of days prior to the apt.

## 2024-06-14 ENCOUNTER — ALLIED HEALTH/NURSE VISIT (OUTPATIENT)
Dept: FAMILY MEDICINE | Facility: CLINIC | Age: 49
End: 2024-06-14
Payer: COMMERCIAL

## 2024-06-14 DIAGNOSIS — Z23 ENCOUNTER FOR IMMUNIZATION: Primary | ICD-10-CM

## 2024-06-14 PROCEDURE — 90750 HZV VACC RECOMBINANT IM: CPT

## 2024-06-14 PROCEDURE — 90471 IMMUNIZATION ADMIN: CPT

## 2024-06-14 NOTE — PROGRESS NOTES
Prior to immunization administration, verified patients identity using patient s name and date of birth. Please see Immunization Activity for additional information.     Screening Questionnaire for Adult Immunization    Are you sick today?   No   Do you have allergies to medications, food, a vaccine component or latex?   No   Have you ever had a serious reaction after receiving a vaccination?   No   Do you have a long-term health problem with heart, lung, kidney, or metabolic disease (e.g., diabetes), asthma, a blood disorder, no spleen, complement component deficiency, a cochlear implant, or a spinal fluid leak?  Are you on long-term aspirin therapy?   No   Do you have cancer, leukemia, HIV/AIDS, or any other immune system problem?   No   Do you have a parent, brother, or sister with an immune system problem?   No   In the past 3 months, have you taken medications that affect  your immune system, such as prednisone, other steroids, or anticancer drugs; drugs for the treatment of rheumatoid arthritis, Crohn s disease, or psoriasis; or have you had radiation treatments?   No   Have you had a seizure, or a brain or other nervous system problem?   No   During the past year, have you received a transfusion of blood or blood    products, or been given immune (gamma) globulin or antiviral drug?   No   For women: Are you pregnant or is there a chance you could become       pregnant during the next month?   No   Have you received any vaccinations in the past 4 weeks?   Yes     Immunization questionnaire was positive for at least one answer.      I have reviewed the following standing orders:   This patient is due and qualifies for the Zoster vaccine.    Click here for Zoster Standing Order    I have reviewed the vaccines inclusion and exclusion criteria; No concerns regarding eligibility.     Patient instructed to remain in clinic for 15 minutes afterwards, and to report any adverse reactions.     Screening performed by  Idania Blevins MA on 6/14/2024 at 11:01 AM.

## 2024-08-07 ENCOUNTER — OFFICE VISIT (OUTPATIENT)
Dept: DERMATOLOGY | Facility: CLINIC | Age: 49
End: 2024-08-07
Payer: COMMERCIAL

## 2024-08-07 DIAGNOSIS — L30.1 DYSHIDROTIC ECZEMA: Primary | ICD-10-CM

## 2024-08-07 PROCEDURE — 99213 OFFICE O/P EST LOW 20 MIN: CPT | Performed by: PHYSICIAN ASSISTANT

## 2024-08-07 RX ORDER — TACROLIMUS 1 MG/G
OINTMENT TOPICAL
Qty: 30 G | Refills: 4 | Status: SHIPPED | OUTPATIENT
Start: 2024-08-07

## 2024-08-07 RX ORDER — CLOBETASOL PROPIONATE 0.5 MG/G
CREAM TOPICAL
Qty: 60 G | Refills: 4 | Status: SHIPPED | OUTPATIENT
Start: 2024-08-07

## 2024-08-07 NOTE — PROGRESS NOTES
Gagandeep Oswald is a pleasant 49 year old year old male patient here today for rash on hands present for over one year. He notes blistering, scaling, fissuring comes and goes. He has tried cortisone. He make his own soap and uses aquaphor. Patient has no other skin complaints today.  Remainder of the HPI, Meds, PMH, Allergies, FH, and SH was reviewed in chart.   Past Medical History:   Diagnosis Date    Facial nerve palsy     LT congenital     Hyperlipidemia LDL goal < 160 2012    Kidney stone 2021    MEN1 (multiple endocrine neoplasia) (H)     Nephrolithiasis     has had it 3 times-last one was in 2016    New onset atrial fibrillation (H) 2021    Varicocele     LT       Past Surgical History:   Procedure Laterality Date    COLONOSCOPY N/A 2022    Procedure: COLONOSCOPY, FLEXIBLE, WITH LESION REMOVAL USING SNARE;  Surgeon: Mukund Hayes MD;  Location: [facility]    COLONOSCOPY WITH CO2 INSUFFLATION N/A 2022    Procedure: COLONOSCOPY, WITH CO2 INSUFFLATION;  Surgeon: Mukund Hayes MD;  Location: [facility]    ENDOSCOPIC SINUS SURGERY  2013    Procedure: ENDOSCOPIC SINUS SURGERY;  Bilateral total ethmoidectomy, bilateral endoscopic maxillary antrostomy;  Surgeon: Han Toledo MD;  Location: [facility]    ENT SURGERY  2013    Surgery to correct sinus infection    MOUTH SURGERY  2012    [place] Teeth Removed    VASECTOMY          Family History   Problem Relation Age of Onset    Allergies Mother     Arthritis Mother     Breast Cancer Mother 70    Diabetes Mother     Obesity Mother     Pancreatic Cancer Mother 80    Endometrial Cancer Mother 82    C.A.D. Father 82        MI    Prostate Cancer Father 77    Kidney Cancer Father 83    Hypertension Father     Skin Cancer Father 74    Cancer Father 85        urethra; mets to liver,  at 87    Obesity Sister     Multiple Endocrine Neoplasia Type 1 Sister     Stomach Cancer Sister         3 occurrences    Basal cell  carcinoma Sister 63    Bladder Cancer Sister 44        recurrences at 51 and 60    Breast Cancer Sister 63    Squamous cell carcinoma Sister 68    Colon Polyps Sister     Obesity Sister     Hypercalcemia Sister     Thyroid Disease Sister     Colon Polyps Sister         2-5    Anxiety Disorder Sister     Obesity Sister     Obesity Brother     Diabetes Brother     Colon Polyps Brother         10-11    Basal cell carcinoma Brother 64    Breast Cancer Maternal Grandmother 92         at 99    Brain Cancer Maternal Uncle 72         shortly thereafter    Colon Cancer No family hx of        Social History     Socioeconomic History    Marital status:      Spouse name: Not on file    Number of children: 1    Years of education: 16    Highest education level: Not on file   Occupational History    Occupation: [employer]      Employer: [employer]   Tobacco Use    Smoking status: Former     Current packs/day: 0.00     Average packs/day: 0.5 packs/day for 3.0 years (1.5 ttl pk-yrs)     Types: Cigarettes     Start date: 2002     Quit date: 2005     Years since quittin.6    Smokeless tobacco: Never   Vaping Use    Vaping status: Never Used   Substance and Sexual Activity    Alcohol use: Yes     Alcohol/week: 8.3 standard drinks of alcohol     Comment: 2 drinks a week    Drug use: No    Sexual activity: Yes     Partners: Female     Birth control/protection: Male Surgical   Other Topics Concern    Parent/sibling w/ CABG, MI or angioplasty before 65F 55M? No   Social History Narrative    Not on file     Social Determinants of Health     Financial Resource Strain: Low Risk  (2024)    Financial Resource Strain     Within the past 12 months, have you or your family members you live with been unable to get utilities (heat, electricity) when it was really needed?: No   Food Insecurity: Low Risk  (2024)    Food Insecurity     Within the past 12 months, did you worry that your food would run out before you got  money to buy more?: No     Within the past 12 months, did the food you bought just not last and you didn t have money to get more?: No   Transportation Needs: Low Risk  (4/4/2024)    Transportation Needs     Within the past 12 months, has lack of transportation kept you from medical appointments, getting your medicines, non-medical meetings or appointments, work, or from getting things that you need?: No   Physical Activity: Sufficiently Active (4/4/2024)    Exercise Vital Sign     Days of Exercise per Week: 4 days     Minutes of Exercise per Session: 50 min   Stress: Stress Concern Present (4/4/2024)    [employer] [place] [employer] - Occupational Stress Questionnaire     Feeling of Stress : Rather much   Social Connections: Unknown (4/4/2024)    Social Connection and Isolation Panel [NHANES]     Frequency of Communication with Friends and Family: Not on file     Frequency of Social Gatherings with Friends and Family: Never     Attends Episcopal Services: Not on file     Active Member of Clubs or Organizations: Not on file     Attends Club or Organization Meetings: Not on file     Marital Status: Not on file   Interpersonal Safety: Low Risk  (4/11/2024)    Interpersonal Safety     Do you feel physically and emotionally safe where you currently live?: Yes     Within the past 12 months, have you been hit, slapped, kicked or otherwise physically hurt by someone?: No     Within the past 12 months, have you been humiliated or emotionally abused in other ways by your partner or ex-partner?: No   Housing Stability: Low Risk  (4/4/2024)    Housing Stability     Do you have housing? : Yes     Are you worried about losing your housing?: No       Outpatient Encounter Medications as of 8/7/2024   Medication Sig Dispense Refill    clobetasol propionate (TEMOVATE) 0.05 % external cream Apply twice daily daily to rash. 60 g 4    hydrocortisone 2.5 % cream Apply topically 2 times daily 30 g 0    tacrolimus (PROTOPIC)  0.1 % external ointment Apply every other day with clobetasol to rash on hands. 30 g 4    co-enzyme Q-10 100 MG CAPS capsule       fish oil-omega-3 fatty acids 1000 MG capsule       hydrochlorothiazide (HYDRODIURIL) 25 MG tablet Take 1 tablet (25 mg) by mouth daily 90 tablet 2    Multiple Vitamin (MULTIVITAMINS PO) Take  by mouth.       No facility-administered encounter medications on file as of 8/7/2024.             O:   NAD, WDWN, Alert & Oriented, Mood & Affect wnl, Vitals stable   Here today alone   There were no vitals taken for this visit.   General appearance normal   Vitals stable   Alert, oriented and in no acute distress      Vesicular, scaly fissuring on hands       Eyes: Conjunctivae/lids:Normal     ENT: Lips normal    MSK:Normal    Pulm: Breathing Normal    Neuro/Psych: Orientation:Alert and Orientedx3 ; Mood/Affect:normal   A/P:  1. Dyshidrotic eczema  Recommend vanicream products.   Apply clobetasol twice daily for 2-3 weeks then after improving start alternating tacrolimus. Use clobetasol three times weekly.   If flaring increase use of clobetasol.   If not controlled in the next two months, send mychart then I send in for dupixent.   Skin care regimen reviewed with patient: Eliminate harsh soaps, i.e. Dial, zest, irsih spring; Mild soaps such as Cetaphil or Dove sensitive skin, avoid hot or cold showers, aggressive use of emollients including vanicream, cetaphil or cerave discussed with patient.    Return to clinic if not controlled.

## 2024-08-07 NOTE — PATIENT INSTRUCTIONS
Recommend vanicream products.   Apply clobetasol twice daily for 2-3 weeks then after improving start alternating tacrolimus. Use clobetasol three times weekly.   If flaring increase use of clobetasol.     If not controlled in the next two months, send mychart then I send in for dupixent.

## 2024-08-19 ENCOUNTER — LAB (OUTPATIENT)
Dept: LAB | Facility: CLINIC | Age: 49
End: 2024-08-19
Payer: COMMERCIAL

## 2024-08-19 DIAGNOSIS — E31.21 MEN 1 SYNDROME (H): ICD-10-CM

## 2024-08-19 DIAGNOSIS — N20.0 KIDNEY STONE: ICD-10-CM

## 2024-08-19 LAB
ALBUMIN SERPL BCG-MCNC: 4.3 G/DL (ref 3.5–5.2)
ALP SERPL-CCNC: 119 U/L (ref 40–150)
ALT SERPL W P-5'-P-CCNC: 48 U/L (ref 0–70)
ANION GAP SERPL CALCULATED.3IONS-SCNC: 12 MMOL/L (ref 7–15)
AST SERPL W P-5'-P-CCNC: 40 U/L (ref 0–45)
BILIRUB SERPL-MCNC: 0.5 MG/DL
BUN SERPL-MCNC: 13.9 MG/DL (ref 6–20)
CALCIUM SERPL-MCNC: 9.5 MG/DL (ref 8.8–10.4)
CHLORIDE SERPL-SCNC: 104 MMOL/L (ref 98–107)
CREAT SERPL-MCNC: 1.02 MG/DL (ref 0.67–1.17)
EGFRCR SERPLBLD CKD-EPI 2021: 90 ML/MIN/1.73M2
GLUCOSE SERPL-MCNC: 103 MG/DL (ref 70–99)
HCO3 SERPL-SCNC: 22 MMOL/L (ref 22–29)
PHOSPHATE SERPL-MCNC: 2.2 MG/DL (ref 2.5–4.5)
POTASSIUM SERPL-SCNC: 4.2 MMOL/L (ref 3.4–5.3)
PROLACTIN SERPL 3RD IS-MCNC: 8 NG/ML (ref 4–15)
PROT SERPL-MCNC: 7 G/DL (ref 6.4–8.3)
PTH-INTACT SERPL-MCNC: 34 PG/ML (ref 15–65)
SODIUM SERPL-SCNC: 138 MMOL/L (ref 135–145)

## 2024-08-19 PROCEDURE — 86316 IMMUNOASSAY TUMOR OTHER: CPT | Mod: 90

## 2024-08-19 PROCEDURE — 83970 ASSAY OF PARATHORMONE: CPT

## 2024-08-19 PROCEDURE — 84586 ASSAY OF VIP: CPT | Mod: 90

## 2024-08-19 PROCEDURE — 84100 ASSAY OF PHOSPHORUS: CPT

## 2024-08-19 PROCEDURE — 80053 COMPREHEN METABOLIC PANEL: CPT

## 2024-08-19 PROCEDURE — 99000 SPECIMEN HANDLING OFFICE-LAB: CPT

## 2024-08-19 PROCEDURE — 36415 COLL VENOUS BLD VENIPUNCTURE: CPT

## 2024-08-19 PROCEDURE — 82943 ASSAY OF GLUCAGON: CPT | Mod: 90

## 2024-08-19 PROCEDURE — 84146 ASSAY OF PROLACTIN: CPT

## 2024-08-19 PROCEDURE — 84305 ASSAY OF SOMATOMEDIN: CPT

## 2024-08-20 LAB — IGF-I BLD-MCNC: 196 NG/ML (ref 67–225)

## 2024-08-21 LAB — CGA SERPL-MCNC: 56 NG/ML

## 2024-08-22 ENCOUNTER — ANCILLARY PROCEDURE (OUTPATIENT)
Dept: MRI IMAGING | Facility: CLINIC | Age: 49
End: 2024-08-22
Attending: INTERNAL MEDICINE
Payer: COMMERCIAL

## 2024-08-22 DIAGNOSIS — E31.21 MEN 1 SYNDROME (H): ICD-10-CM

## 2024-08-22 LAB — GLUCAGON SERPL-MCNC: 235 NG/L

## 2024-08-22 PROCEDURE — 74183 MRI ABD W/O CNTR FLWD CNTR: CPT | Mod: GC | Performed by: RADIOLOGY

## 2024-08-22 PROCEDURE — A9585 GADOBUTROL INJECTION: HCPCS | Performed by: RADIOLOGY

## 2024-08-22 RX ORDER — GADOBUTROL 604.72 MG/ML
10 INJECTION INTRAVENOUS ONCE
Status: COMPLETED | OUTPATIENT
Start: 2024-08-22 | End: 2024-08-22

## 2024-08-22 RX ADMIN — GADOBUTROL 10 ML: 604.72 INJECTION INTRAVENOUS at 14:36

## 2024-08-22 NOTE — RESULT ENCOUNTER NOTE
Patient came in for D8 Venofer without complaints. Venofer given and tolerated. Patient decline thirty minute observation,   tolerated well previously, blood pressure WNL. AVS given, patient denies further needs today.    The MRI revealed a couple of very tiny cysts, for which monitoring is recommended.  The lab results are fairly unremarkable.  We are going to discuss in detail at your upcoming appointment.

## 2024-08-27 ENCOUNTER — OFFICE VISIT (OUTPATIENT)
Dept: ENDOCRINOLOGY | Facility: CLINIC | Age: 49
End: 2024-08-27
Payer: COMMERCIAL

## 2024-08-27 VITALS
SYSTOLIC BLOOD PRESSURE: 122 MMHG | HEART RATE: 77 BPM | OXYGEN SATURATION: 98 % | RESPIRATION RATE: 18 BRPM | DIASTOLIC BLOOD PRESSURE: 96 MMHG | WEIGHT: 231 LBS | BODY MASS INDEX: 30.48 KG/M2

## 2024-08-27 DIAGNOSIS — R82.994 HYPERCALCIURIA: ICD-10-CM

## 2024-08-27 DIAGNOSIS — K86.2 PANCREATIC CYST: ICD-10-CM

## 2024-08-27 DIAGNOSIS — E31.21 MEN 1 SYNDROME (H): Primary | ICD-10-CM

## 2024-08-27 DIAGNOSIS — R79.89: ICD-10-CM

## 2024-08-27 DIAGNOSIS — N20.0 KIDNEY STONE: ICD-10-CM

## 2024-08-27 DIAGNOSIS — D35.2 PITUITARY ADENOMA (H): ICD-10-CM

## 2024-08-27 PROCEDURE — G2211 COMPLEX E/M VISIT ADD ON: HCPCS | Performed by: INTERNAL MEDICINE

## 2024-08-27 PROCEDURE — 99215 OFFICE O/P EST HI 40 MIN: CPT | Performed by: INTERNAL MEDICINE

## 2024-08-27 NOTE — PROGRESS NOTES
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    Questionable small pituitary adenoma, stable on imaging from November 2023 to April 2024  Biochemical interrogation of the pituitary gland function was unremarkable.  Plan:  Schedule a follow-up pituitary MRI in 1 year, prior to his next visit with me  Check prolactin and IGF-I in 1 year    2.  Mildly elevated glucagon, associated with a borderline elevated fasting glucose  Continue to monitor for now  Follow-up fasting glucose and glucagon in 1 year    3.  Pancreatic cystic lesions discovered on the screening MRI from August 2024.  Schedule a follow-up MRI in 1 year.    4.  Primary hyperparathyroidism  The diagnosis is based on the evidence of hypercalciuria and hypophosphatemia in the context of normal serum calcium, parathyroid hormone levels. The patient has a history of kidney stones.  Plan:  Check 24-hour urinary calcium.  Recheck calcium, phosphorus, PTH and vitamin D level at his next appointment.      Orders Placed This Encounter   Procedures    MR Brain w/o & w Contrast    MR Abdomen w/o & w Contrast    25 Hydroxyvitamin D2 and D3    Parathyroid Hormone Intact    Phosphorus    Comprehensive metabolic panel    Hemoglobin A1c    Prolactin    Insulin Growth Factor 1 by Immunoassay    Glucagon     =======================================================  The patient is seen in follow-up.  He established care in our clinic on 5/15/2024.    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    Patient has a very strong family history of cancers on both sides. His  "sister was found to have a MEN1 mutation in the context of recurring gastric cancer, hypercalcemia, thyroid disease. Patient saw genetic counselor 5/22/2023 due to family history with syndromic pattern. 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   The patient established care in the cancer risk management clinic in October 2023.  In August 2023, he was evaluated by dermatology.    1. Kidney stones, hypercalciuria   He has a history of kidney stones, estimates \"one every year and a half\" for which he saw urology. Determined to be calcium oxalate stones. Historically his calcium has always been normal on labs.  The 24-hour urinary calcium from November 2023 and May 2024 was elevated at 0.54 g and 0.6 g, respectively.  Subsequently, the patient was started on treatment with 25 mg hydrochlorothiazide daily, in May 2024.  The parathyroid hormone level has been normal and phosphorus was mildly low on recent lab work.  The CT abdomen from August 2023 was positive for kidney stones and associated mild hydronephrosis.    2. ? Pituitary adenoma   MR Pituitary on 11/1/2023, Subtle 8 x 5 x 4 mm area of differential signal within the posterior pituitary gland could reflect a small adenoma. The findings have remained unchanged on the follow-up MRI from 4/29/2024.   Labs thus far have included normal PRL, normal IGF-1.    3. Mildly elevated glucagon  Nonfasting labs revealed an elevated glucagon at 503 in 10/23. On subsequent lab work, the fasting glucagon level was lower, with the most recent value of 235 on 8/19/2024.  The abdominal MRI from 8/2024 revealed a tiny 3 mm cystic lesion in the tail of the pancreas and a nonenhancing T1 hyperintense encapsulated cystic lesion along the inferior aspect of the pancreatic tail and spleen, potentially a hemorrhagic pseudocyst or walled off necrosis related to prior pancreatitis, and " corresponding to described findings on prior outside CT reports. No internal enhancement to suggest a neoplasm.    4.  The chest MRI from December 2023 was negative for thymoma.    Pertinent labs reviewed:  8/19/2024  Normal BMP with a phosphorus low at 2.2, glucagon 235, fasting glucose 103, prolactin 8, IGF-I normal at 196, PTH 34, chromogranin A 56    5/6/2024  Glucagon 318, glucose 95  PTH 43, phosphorus 3.2, albumin 4.5, calcium 9.1, ionized calcium normal at 4.8.  24-hour urinary creatinine 2.02 g, urinary calcium 0.6 g, urinary volume 3.55 L.    10/20/23   chromogranin A normal at 57, glucagon 503 (<208), glucose 102 (nonfasting)   pancreatic polypeptide 1040 (0-435)  VIP 38   prolactin 9, IGF1 normal at 199   calcium 9.2, PTH 43    gastrin < 10     11/10/23   Fasting glucagon 256  fasting glucose 101 (pt had some peppermints just prior to having the blood drawn)    1/14/22   A1c 5.1, TSH 0.55     ROS  Systemic symptoms: weight stable; no fatigue   Eye symptoms: floaters   Otolaryngeal symptoms: No otolaryngeal symptoms.   Breast symptoms: No breast symptoms.  Cardiovascular symptoms: palpitations lasting less than 10 seconds, skipped beats or pounding heart beats, not daily and generally related to anxiety    Pulmonary symptoms: No pulmonary symptoms.  Gastrointestinal symptoms: No gastrointestinal symptoms. No nausea, no vomiting, no abd pain, no constipation or diarrhea   Genitourinary symptoms: No genitourinary symptoms.  Endocrine symptoms: longstanding heat intolerance, unchanged. No hot flashes. No enlargement of the hands or feet.  No breast tenderness.  Hematologic symptoms: No hematologic symptoms.  Musculoskeletal symptoms: No musculoskeletal symptoms unless related to physical activity.  Longstanding tremor of the hands.  Neurological symptoms: neck tension. Occasional headaches. Right big toe numbness present intermittently. No dizziness.   Psychological symptoms: longstanding anxiety; in therapy    Skin symptoms: No skin lesions     Past Medical History   Past Medical History:   Diagnosis Date    Facial nerve palsy     LT congenital     Hyperlipidemia LDL goal < 160 05/07/2012    Kidney stone 09/20/2021    MEN1 (multiple endocrine neoplasia) (H)     Nephrolithiasis     has had it 3 times-last one was in 2016    New onset atrial fibrillation (H) 05/17/2021    Varicocele     LT   Colon polyps  Seborrheic keratosis, lentigo, angioma, benign nevi (derm exam 8/23)    Past Surgical History   Past Surgical History:   Procedure Laterality Date    COLONOSCOPY N/A 6/13/2022    Procedure: COLONOSCOPY, FLEXIBLE, WITH LESION REMOVAL USING SNARE;  Surgeon: Mukund Hayes MD;  Location: [facility]    COLONOSCOPY WITH CO2 INSUFFLATION N/A 6/13/2022    Procedure: COLONOSCOPY, WITH CO2 INSUFFLATION;  Surgeon: Mukund Hayse MD;  Location: [facility]    ENDOSCOPIC SINUS SURGERY  1/24/2013    Procedure: ENDOSCOPIC SINUS SURGERY;  Bilateral total ethmoidectomy, bilateral endoscopic maxillary antrostomy;  Surgeon: Han Toledo MD;  Location: [facility]    ENT SURGERY  1/24/2013    Surgery to correct sinus infection    MOUTH SURGERY  6/2012    [place] Teeth Removed    VASECTOMY       Current Medications    Current Outpatient Medications:     clobetasol propionate (TEMOVATE) 0.05 % external cream, Apply twice daily daily to rash., Disp: 60 g, Rfl: 4    co-enzyme Q-10 100 MG CAPS capsule, , Disp: , Rfl:     fish oil-omega-3 fatty acids 1000 MG capsule, , Disp: , Rfl:     hydrochlorothiazide (HYDRODIURIL) 25 MG tablet, Take 1 tablet (25 mg) by mouth daily, Disp: 90 tablet, Rfl: 2    hydrocortisone 2.5 % cream, Apply topically 2 times daily, Disp: 30 g, Rfl: 0    Multiple Vitamin (MULTIVITAMINS PO), Take  by mouth., Disp: , Rfl:     tacrolimus (PROTOPIC) 0.1 % external ointment, Apply every other day with clobetasol to rash on hands., Disp: 30 g, Rfl: 4    Family History   Problem Relation Age of Onset    Allergies Mother      Arthritis Mother     Breast Cancer Mother 70    Diabetes Mother     Obesity Mother     Pancreatic Cancer Mother 80    Endometrial Cancer Mother 82    C.A.D. Father 82        MI    Prostate Cancer Father 77    Kidney Cancer Father 83    Hypertension Father     Skin Cancer Father 74    Cancer Father 85        urethra; mets to liver,  at 87    Obesity Sister     Multiple Endocrine Neoplasia Type 1 Sister     Stomach Cancer Sister         3 occurrences    Basal cell carcinoma Sister 63    Bladder Cancer Sister 44        recurrences at 51 and 60    Breast Cancer Sister 63    Squamous cell carcinoma Sister 68    Colon Polyps Sister     Obesity Sister     Hypercalcemia Sister     Thyroid Disease Sister     Colon Polyps Sister         2-5    Anxiety Disorder Sister     Obesity Sister     Obesity Brother     Diabetes Brother     Colon Polyps Brother         10-11    Basal cell carcinoma Brother 64    Breast Cancer Maternal Grandmother 92         at 99    Brain Cancer Maternal Uncle 72         shortly thereafter    Colon Cancer No family hx of    Brother - one episode of kidney stones. Sister with thyroid surgery for benign lesion.   No F/H os osteoporosis or hip fractures.     Social History  . He has   Social History     Socioeconomic History    Marital status:     Number of children: 1    Years of education: 16   Occupational History    Occupation: IT      Employer: [employer]   Tobacco Use    Smoking status: Former     Packs/day: 0.50     Years: 3.00     Additional pack years: 0.00     Total pack years: 1.50     Types: Cigarettes     Quit date: 2005     Years since quittin.8    Smokeless tobacco: Never   Vaping Use    Vaping Use: Never used   Substance and Sexual Activity    Alcohol use: Yes     Alcohol/week: 8.3 standard drinks of alcohol     Comment: 6 drinks a week    Drug use: No    Sexual activity: Yes     Partners: Female     Birth control/protection: Male Surgical   Other  Topics Concern    Parent/sibling w/ CABG, MI or angioplasty before 65F 55M? No       Vital Signs     Previous Weights:    Wt Readings from Last 10 Encounters:   24 104.8 kg (231 lb)   05/15/24 107.5 kg (237 lb)   05/10/24 109.3 kg (241 lb)   24 109.8 kg (242 lb)   11/15/23 108 kg (238 lb)   10/10/23 111.9 kg (246 lb 9.6 oz)   23 107 kg (236 lb)   23 102.1 kg (225 lb)   23 107.9 kg (237 lb 12.8 oz)   22 102.1 kg (225 lb)        BP (!) 122/96 (BP Location: Left arm, Patient Position: Sitting, Cuff Size: Adult Large)   Pulse 77   Resp 18   Wt 104.8 kg (231 lb)   SpO2 98%   BMI 30.48 kg/m      Physical Exam  General Appearance: Tall, pleasant, no distress noted  Eyes: conjutivae and extra-ocular motions are normal, pupils round and reactive to light, no lid lag, no stare    HEENT: no JVD, no bruits, no thyromegaly, no palpable nodules   Cardiovascular: regular rhythm, no murmurs, distal pulse palpable, no edema  Respiratory: chest clear, no rales, no rhonchi   Gastrointestinal: abdomen soft, non-tender, non-distended, normal bowel sounds, no organomegaly  Musculoskeletal: normal tone and strength  Psychological: affect and judgment normal  Skin: 2 areas of hyperpigmented (burgundy looking) skin on the posterior left back, minimal pale stretch marks, sweaty.  Neurological: reflexes normal and symmetric, very fine tremor.      Lab Results  I reviewed prior lab results documented in Epic.    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 Specifically his mutation is called c.652C>T (also known as [id number]) identified using a custom panel (a combination of the Common hereditary cancers panel plus renal/urinary tract cancers panels) from [facility]  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 This variant is called  c.1561A>T (also known as p.[id number]). Given the uncertain significance of this result, medical management decisions should NOT be made based on this test result alone.    The longitudinal plan of care for the diagnosis(es)/condition(s) as documented were addressed during this visit. Due to the added complexity in care, I will continue to support Gagandeep in the subsequent management and with ongoing continuity of care.

## 2024-08-27 NOTE — NURSING NOTE
Gagandeep Oswald's goals for this visit include:   Chief Complaint   Patient presents with    Follow Up     Hyperlipidemia pituitary lesion       He requests these members of his care team be copied on today's visit information: yes    PCP: Claudia Solo    Referring Provider:  Claudia Solo MD  [address] [facility]  [place] [id number]    BP (!) 122/96 (BP Location: Left arm, Patient Position: Sitting, Cuff Size: Adult Large)   Pulse 77   Resp 18   Wt 104.8 kg (231 lb)   SpO2 98%   BMI 30.48 kg/m      Do you need any medication refills at today's visit? None    Anuel Burton, EMT

## 2024-10-10 NOTE — PROGRESS NOTES
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      Primary Physician: Claudia Solo MD     History Of Present Illness:  Mr. 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 Specifically his mutation is called c.[id number] (also known as [id number]) identified using a custom panel (a combination of the Common hereditary cancers panel plus renal/urinary tract cancers panels) from [employer].    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 This variant is called c.[id number] (also known as [id number]). Given the uncertain significance of this result, medical management decisions should NOT be made based on this test result alone.     Pertinent Screening History:  2022: Baseline Colonoscopy: One tubular adenoma. Next follow-up: 2027    Pituitary:  2023:  MRI pituitary, Subtle 8 x 5 x 4 mm area of differential signal within the posterior pituitary gland could reflect a small adenoma, but is not entirely specific  2024: MRI pituitary: Stable questionable area of subtle hypoenhancement in the posterior aspect of the adenohypophysis. While this could simply represent heterogeneous enhancement within a normal adenohypophysis, adenoma cannot be completely excluded. Given six-months since the comparison study, recommend one-year follow-up. Otherwise, normal brain MRI. Following with Dr. Burns    Chest:  2023: Chest MRI, normal    Abdomen:   2024: MRI Abdomen, Tiny 3 mm cystic lesion in the tail of the pancreas, favored to represent a  sidebranch IPMN. Consider follow-up MRI in 6 months to ensure stability given patient history of MEN1.  Nonenhancing T1 hyperintense encapsulated cystic lesion along the inferior aspect of the pancreatic tail and spleen, potentially a hemorrhagic pseudocyst or walled off necrosis related to prior  pancreatitis, and corresponding to described findings on prior outside CT reports. No internal enhancement to suggest a neoplasm.    Dermatology:   5/1/2023: full body skin exam with Dr. Fregoso, all findings benign.        Past Medical/Surgical History:  Past Medical History:   Diagnosis Date    Facial nerve palsy     LT congenital     Hyperlipidemia LDL goal < 160 05/07/2012    Kidney stone 09/20/2021    MEN1 (multiple endocrine neoplasia) (H)     Nephrolithiasis     has had it 3 times-last one was in 2016    New onset atrial fibrillation (H) 05/17/2021    Varicocele     LT     Past Surgical History:   Procedure Laterality Date    COLONOSCOPY N/A 6/13/2022    Procedure: COLONOSCOPY, FLEXIBLE, WITH LESION REMOVAL USING SNARE;  Surgeon: Mukund Hayes MD;  Location: [facility] OR    COLONOSCOPY WITH CO2 INSUFFLATION N/A 6/13/2022    Procedure: COLONOSCOPY, WITH CO2 INSUFFLATION;  Surgeon: Mukund Hayes MD;  Location: [facility] OR    ENDOSCOPIC SINUS SURGERY  1/24/2013    Procedure: ENDOSCOPIC SINUS SURGERY;  Bilateral total ethmoidectomy, bilateral endoscopic maxillary antrostomy;  Surgeon: Han Toledo MD;  Location: [facility] OR    ENT SURGERY  1/24/2013    Surgery to correct sinus infection    MOUTH SURGERY  6/2012    Towner Teeth Removed    VASECTOMY         Allergies:  Allergies as of 10/14/2024 - Reviewed 10/14/2024   Allergen Reaction Noted    Nickel Itching and Rash 05/01/1986       Current Medications:  Current Outpatient Medications   Medication Sig Dispense Refill    clobetasol propionate (TEMOVATE) 0.05 % external cream Apply twice daily daily to rash. 60 g 4    co-enzyme Q-10 100 MG CAPS capsule        fish oil-omega-3 fatty acids 1000 MG capsule       hydrochlorothiazide (HYDRODIURIL) 25 MG tablet Take 1 tablet (25 mg) by mouth daily 90 tablet 2    Multiple Vitamin (MULTIVITAMINS PO) Take  by mouth.      tacrolimus (PROTOPIC) 0.1 % external ointment Apply every other day with clobetasol to rash on hands. 30 g 4        Family History:  Family History   Problem Relation Age of Onset    Allergies Mother     Arthritis Mother     Breast Cancer Mother 70    Diabetes Mother     Obesity Mother     Pancreatic Cancer Mother 80    Endometrial Cancer Mother 82    C.A.D. Father 82        MI    Prostate Cancer Father 77    Kidney Cancer Father 83    Hypertension Father     Skin Cancer Father 74    Cancer Father 85        urethra; mets to liver,  at 87    Obesity Sister     Multiple Endocrine Neoplasia Type 1 Sister     Stomach Cancer Sister         3 occurrences    Basal cell carcinoma Sister 63    Bladder Cancer Sister 44        recurrences at 51 and 60    Breast Cancer Sister 63    Squamous cell carcinoma Sister 68    Colon Polyps Sister     Obesity Sister     Hypercalcemia Sister     Thyroid Disease Sister     Colon Polyps Sister         2-5    Anxiety Disorder Sister     Obesity Sister     Obesity Brother     Diabetes Brother     Colon Polyps Brother         10-11    Basal cell carcinoma Brother 64    Breast Cancer Brother 67    Breast Cancer Maternal Grandmother 92         at 99    Brain Cancer Maternal Uncle 72         shortly thereafter    Colon Cancer No family hx of        Social History:  Social History     Socioeconomic History    Marital status:      Spouse name: Not on file    Number of children: 1    Years of education: 16    Highest education level: Not on file   Occupational History    Occupation: IT      Employer: [employer]   Tobacco Use    Smoking status: Former     Current packs/day: 0.00     Average packs/day: 0.5 packs/day for 3.0 years (1.5 ttl pk-yrs)     Types: Cigarettes     Start  date: 2002     Quit date: 2005     Years since quittin.7    Smokeless tobacco: Never   Vaping Use    Vaping status: Never Used   Substance and Sexual Activity    Alcohol use: Yes     Alcohol/week: 8.3 standard drinks of alcohol     Comment: 2 drinks a week    Drug use: No    Sexual activity: Yes     Partners: Female     Birth control/protection: Male Surgical   Other Topics Concern    Parent/sibling w/ CABG, MI or angioplasty before 65F 55M? No   Social History Narrative    Not on file     Social Determinants of Health     Financial Resource Strain: Low Risk  (2024)    Financial Resource Strain     Within the past 12 months, have you or your family members you live with been unable to get utilities (heat, electricity) when it was really needed?: No   Food Insecurity: Low Risk  (2024)    Food Insecurity     Within the past 12 months, did you worry that your food would run out before you got money to buy more?: No     Within the past 12 months, did the food you bought just not last and you didn t have money to get more?: No   Transportation Needs: Low Risk  (2024)    Transportation Needs     Within the past 12 months, has lack of transportation kept you from medical appointments, getting your medicines, non-medical meetings or appointments, work, or from getting things that you need?: No   Physical Activity: Sufficiently Active (2024)    Exercise Vital Sign     Days of Exercise per Week: 4 days     Minutes of Exercise per Session: 50 min   Stress: Stress Concern Present (2024)    [place] [employer] - Occupational Stress Questionnaire     Feeling of Stress : Rather much   Social Connections: Unknown (2024)    Social Connection and Isolation Panel [NHANES]     Frequency of Communication with Friends and Family: Not on file     Frequency of Social Gatherings with Friends and Family: Never     Attends Congregational Services: Not on file     Active Member of Clubs or  "Organizations: Not on file     Attends Club or Organization Meetings: Not on file     Marital Status: Not on file   Interpersonal Safety: Low Risk  (4/11/2024)    Interpersonal Safety     Do you feel physically and emotionally safe where you currently live?: Yes     Within the past 12 months, have you been hit, slapped, kicked or otherwise physically hurt by someone?: No     Within the past 12 months, have you been humiliated or emotionally abused in other ways by your partner or ex-partner?: No   Housing Stability: Low Risk  (4/4/2024)    Housing Stability     Do you have housing? : Yes     Are you worried about losing your housing?: No       Physical Exam:  /82 (BP Location: Right arm)   Pulse 77   Ht 1.854 m (6' 1\")   Wt 106.1 kg (234 lb)   SpO2 96%   BMI 30.87 kg/m    GENERAL: alert and no distress  EYES: Eyes grossly normal to inspection.  No discharge or erythema, or obvious scleral/conjunctival abnormalities.  RESP: No audible wheeze, cough, or visible cyanosis.    SKIN: Visible skin clear. No significant rash, abnormal pigmentation or lesions.  NEURO: Cranial nerves grossly intact.  Mentation and speech appropriate for age.  PSYCH: Appropriate affect, tone, and pace of words    Laboratory/Imaging Studies  No results found for any visits on 10/14/24.    ASSESSMENT      Gagandeep states that he is doing well at this visit. He has no concerns with his health, but does note ongoing, stable, anxiety. He did share that his brother was recently diagnosed with breast cancer at age 67. He does not know if his brother has had genetic testing.     We discussed the screening plan below. He is following closely with Dr. Burns. I have ordered a gastrin and pancreatic polypeptide, otherwise Gagandeep is up to date on MEN1 screening. We discussed that the labs should be drawn on an empty stomach, and he should not drink alcohol for 24 hours prior to completing them.       INDIVIDUALIZED CANCER RISK " MANAGEMENT PLAN:      Individualized Surveillance Plan for Children and Adults with Multiple Endocrine Neoplasia Syndrome (MEN1)  based on NCCN Guidelines Version 1.2023, Cirilo 2012 and Kristofer et al 2017   Clinical evaluation Surveillance Last done Next Due    Start at age 5     Pituitary MRI of pituitary every 3-5 years, starting at age 5.    Annual plasma prolactin, starting at age 5.    Annual IGF-I, starting at age 5. 4/29/2024: MRI pituitary: Stable questionable area of subtle hypoenhancement in the posterior aspect of the adenohypophysis.     8/19/2024: prolactin, 8 (WNL)    8/19/2024: IGF-1, 196 (WNL)     Pituitary MRI due in  2025    Repeat labs in August, 2025        Start at age 8     Parathyroid   Annual plasma calcium, starting at age 8.    Annual PTH, starting at age 8.   8/19/2024: PTH, 34 (WNL)    5/6/2024: Calcium, 4.8 (WNL) Repeat labs with Dr. Burns in August, 2025    Start at age 10     Adrenal and PanNET screening MRI or CT of abdomen, starting prior to age 10.      Repeat every 1-3 years.    Annual chromogranin-A, starting prior to age 10.    Annual pancreatic polypeptide, starting prior to age 10.  8/22/2024: MRI Abdomen, Tiny 3 mm cystic lesion in the tail of the pancreas, favored to represent a sidebranch IPMN.    8/19/2024: Chromogranin A, 56 (WNL)    10/20/2023: Pancreatic polypeptide, 1040 (elevated)     Labs and repeat MRI per Dr. Burns    Pancreatic polypeptide soon    Start at age 15     Thymic or Bronchial Carcinoid CT or MRI of chest, starting at age 15. Repeat every 1-2 years. 12/8/2023: Chest MRI, normal Consider repeating chest MRI in December 2025    Start at age 20     PanNET Annual serum gastrin, starting at age 20 10/20/2023: Gastrin, <10 (WNL) Gastrin soon   GastricNET Endoscopic ultrasound every 3 years in those with hypergastrinemia.           I spent a total of 47 minutes on the day of the visit. Please see the note for further information on patient assessment and  treatment.     Dalia Higgins, DNP, APRN, AGCNS-BC  Clinical Nurse Specialist  Cancer Risk Management Program  [facility]    Cc:  MD Danielle Cano MD

## 2024-10-14 ENCOUNTER — ONCOLOGY VISIT (OUTPATIENT)
Dept: ONCOLOGY | Facility: CLINIC | Age: 49
End: 2024-10-14
Payer: COMMERCIAL

## 2024-10-14 VITALS
HEIGHT: 73 IN | BODY MASS INDEX: 31.01 KG/M2 | SYSTOLIC BLOOD PRESSURE: 128 MMHG | HEART RATE: 77 BPM | OXYGEN SATURATION: 96 % | DIASTOLIC BLOOD PRESSURE: 82 MMHG | WEIGHT: 234 LBS

## 2024-10-14 DIAGNOSIS — Z15.81 MONOALLELIC MUTATION OF MEN1 GENE: Primary | ICD-10-CM

## 2024-10-14 DIAGNOSIS — Z15.89 MONOALLELIC MUTATION OF MEN1 GENE: Primary | ICD-10-CM

## 2024-10-14 PROCEDURE — 99213 OFFICE O/P EST LOW 20 MIN: CPT

## 2024-10-14 PROCEDURE — 99215 OFFICE O/P EST HI 40 MIN: CPT

## 2024-10-14 ASSESSMENT — PAIN SCALES - GENERAL: PAINLEVEL: NO PAIN (0)

## 2024-10-14 NOTE — PATIENT INSTRUCTIONS
Individualized Surveillance Plan for Children and Adults with Multiple Endocrine Neoplasia Syndrome (MEN1)  based on NCCN Guidelines Version 1.2023, Cirilo 2012 and Kristofer et al 2017   Clinical evaluation Surveillance Last done Next Due    Start at age 5     Pituitary MRI of pituitary every 3-5 years, starting at age 5.    Annual plasma prolactin, starting at age 5.    Annual IGF-I, starting at age 5. 4/29/2024: MRI pituitary: Stable questionable area of subtle hypoenhancement in the posterior aspect of the adenohypophysis.     8/19/2024: prolactin, 8 (WNL)    8/19/2024: IGF-1, 196 (WNL)     Pituitary MRI due in April, 2025    Repeat labs in August, 2025        Start at age 8     Parathyroid    Annual plasma calcium, starting at age 8.    Annual PTH, starting at age 8.   8/19/2024: PTH, 34 (WNL)    5/6/2024: Calcium, 4.8 (WNL) Repeat labs with Dr. Burns in August, 2025    Start at age 10     Adrenal and PanNET screening MRI or CT of abdomen, starting prior to age 10.      Repeat every 1-3 years.    Annual chromogranin-A, starting prior to age 10.    Annual pancreatic polypeptide, starting prior to age 10.  8/22/2024: MRI Abdomen, Tiny 3 mm cystic lesion in the tail of the pancreas, favored to represent a sidebranch IPMN.    8/19/2024: Chromogranin A, 56 (WNL)    10/20/2023: Pancreatic polypeptide, 1040 (elevated)     Labs and repeat MRI per Dr. Burns    Consider pancreatic polypeptide    Start at age 15     Thymic or Bronchial Carcinoid CT or MRI of chest, starting at age 15. Repeat every 1-2 years. 12/8/2023: Chest MRI, normal Consider repeating chest MRI in December 2025    Start at age 20     PanNET Annual serum gastrin, starting at age 20 10/20/2023: Gastrin, <10 (WNL) Gastrin soon   GastricNET Endoscopic ultrasound every 3 years in those with hypergastrinemia.

## 2024-10-14 NOTE — LETTER
10/14/2024      Gagandeep sOwald  [address] [place] [address]      Dear Colleague,    Thank you for referring your patient, Gagandeep Oswald, to the [facility]. Please see a copy of my visit note below.    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      Primary Physician: Claudia Solo MD     History Of Present Illness:  Mr. 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 Specifically his mutation is called c.652C>T (also known as [id number]) identified using a custom panel (a combination of the Common hereditary cancers panel plus renal/urinary tract cancers panels) from [employer].    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 This variant is called c.[id number] (also known as p.[id number]). Given the uncertain significance of this result, medical management decisions should NOT be made based on this test result alone.     Pertinent Screening History:  2022: Baseline Colonoscopy: One tubular adenoma. Next follow-up: 2027    Pituitary:  2023:  MRI pituitary, Subtle 8 x 5 x 4 mm area of differential signal within the posterior pituitary gland could reflect a small adenoma, but is not entirely specific  2024: MRI pituitary: Stable questionable area of subtle hypoenhancement in the posterior aspect of the adenohypophysis. While this could simply represent heterogeneous enhancement within a normal adenohypophysis, adenoma cannot be completely excluded. Given six-months since  the comparison study, recommend one-year follow-up. Otherwise, normal brain MRI. Following with Dr. Burns    Chest:  12/8/2023: Chest MRI, normal    Abdomen:   8/22/2024: MRI Abdomen, Tiny 3 mm cystic lesion in the tail of the pancreas, favored to represent a sidebranch IPMN. Consider follow-up MRI in 6 months to ensure stability given patient history of MEN1.  Nonenhancing T1 hyperintense encapsulated cystic lesion along the inferior aspect of the pancreatic tail and spleen, potentially a hemorrhagic pseudocyst or walled off necrosis related to prior  pancreatitis, and corresponding to described findings on prior outside CT reports. No internal enhancement to suggest a neoplasm.    Dermatology:   5/1/2023: full body skin exam with Dr. Fregoso, all findings benign.        Past Medical/Surgical History:  Past Medical History:   Diagnosis Date     Facial nerve palsy     LT congenital      Hyperlipidemia LDL goal < 160 05/07/2012     Kidney stone 09/20/2021     MEN1 (multiple endocrine neoplasia) (H)      Nephrolithiasis     has had it 3 times-last one was in 2016     New onset atrial fibrillation (H) 05/17/2021     Varicocele     LT     Past Surgical History:   Procedure Laterality Date     COLONOSCOPY N/A 6/13/2022    Procedure: COLONOSCOPY, FLEXIBLE, WITH LESION REMOVAL USING SNARE;  Surgeon: Mukund Hayes MD;  Location: [facility]     COLONOSCOPY WITH CO2 INSUFFLATION N/A 6/13/2022    Procedure: COLONOSCOPY, WITH CO2 INSUFFLATION;  Surgeon: Mukund Hayes MD;  Location: [facility]     ENDOSCOPIC SINUS SURGERY  1/24/2013    Procedure: ENDOSCOPIC SINUS SURGERY;  Bilateral total ethmoidectomy, bilateral endoscopic maxillary antrostomy;  Surgeon: Han Toledo MD;  Location: [facility] OR     ENT SURGERY  1/24/2013    Surgery to correct sinus infection     MOUTH SURGERY  6/2012    [place] Teeth Removed     VASECTOMY         Allergies:  Allergies as of 10/14/2024 - Reviewed 10/14/2024   Allergen Reaction  Noted     Nickel Itching and Rash 1986       Current Medications:  Current Outpatient Medications   Medication Sig Dispense Refill     clobetasol propionate (TEMOVATE) 0.05 % external cream Apply twice daily daily to rash. 60 g 4     co-enzyme Q-10 100 MG CAPS capsule        fish oil-omega-3 fatty acids 1000 MG capsule        hydrochlorothiazide (HYDRODIURIL) 25 MG tablet Take 1 tablet (25 mg) by mouth daily 90 tablet 2     Multiple Vitamin (MULTIVITAMINS PO) Take  by mouth.       tacrolimus (PROTOPIC) 0.1 % external ointment Apply every other day with clobetasol to rash on hands. 30 g 4        Family History:  Family History   Problem Relation Age of Onset     Allergies Mother      Arthritis Mother      Breast Cancer Mother 70     Diabetes Mother      Obesity Mother      Pancreatic Cancer Mother 80     Endometrial Cancer Mother 82     C.A.D. Father 82        MI     Prostate Cancer Father 77     Kidney Cancer Father 83     Hypertension Father      Skin Cancer Father 74     Cancer Father 85        urethra; mets to liver,  at 87     Obesity Sister      Multiple Endocrine Neoplasia Type 1 Sister      Stomach Cancer Sister         3 occurrences     Basal cell carcinoma Sister 63     Bladder Cancer Sister 44        recurrences at 51 and 60     Breast Cancer Sister 63     Squamous cell carcinoma Sister 68     Colon Polyps Sister      Obesity Sister      Hypercalcemia Sister      Thyroid Disease Sister      Colon Polyps Sister         2-5     Anxiety Disorder Sister      Obesity Sister      Obesity Brother      Diabetes Brother      Colon Polyps Brother         10-11     Basal cell carcinoma Brother 64     Breast Cancer Brother 67     Breast Cancer Maternal Grandmother 92         at 99     Brain Cancer Maternal Uncle 72         shortly thereafter     Colon Cancer No family hx of        Social History:  Social History     Socioeconomic History     Marital status:      Spouse name: Not on file      Number of children: 1     Years of education: 16     Highest education level: Not on file   Occupational History     Occupation: IT      Employer: [employer]   Tobacco Use     Smoking status: Former     Current packs/day: 0.00     Average packs/day: 0.5 packs/day for 3.0 years (1.5 ttl pk-yrs)     Types: Cigarettes     Start date: 2002     Quit date: 2005     Years since quittin.7     Smokeless tobacco: Never   Vaping Use     Vaping status: Never Used   Substance and Sexual Activity     Alcohol use: Yes     Alcohol/week: 8.3 standard drinks of alcohol     Comment: 2 drinks a week     Drug use: No     Sexual activity: Yes     Partners: Female     Birth control/protection: Male Surgical   Other Topics Concern     Parent/sibling w/ CABG, MI or angioplasty before 65F 55M? No   Social History Narrative     Not on file     Social Determinants of Health     Financial Resource Strain: Low Risk  (2024)    Financial Resource Strain      Within the past 12 months, have you or your family members you live with been unable to get utilities (heat, electricity) when it was really needed?: No   Food Insecurity: Low Risk  (2024)    Food Insecurity      Within the past 12 months, did you worry that your food would run out before you got money to buy more?: No      Within the past 12 months, did the food you bought just not last and you didn t have money to get more?: No   Transportation Needs: Low Risk  (2024)    Transportation Needs      Within the past 12 months, has lack of transportation kept you from medical appointments, getting your medicines, non-medical meetings or appointments, work, or from getting things that you need?: No   Physical Activity: Sufficiently Active (2024)    Exercise Vital Sign      Days of Exercise per Week: 4 days      Minutes of Exercise per Session: 50 min   Stress: Stress Concern Present (2024)    [employer] [employer] - Occupational Stress  "Questionnaire      Feeling of Stress : Rather much   Social Connections: Unknown (4/4/2024)    Social Connection and Isolation Panel [NHANES]      Frequency of Communication with Friends and Family: Not on file      Frequency of Social Gatherings with Friends and Family: Never      Attends Lutheran Services: Not on file      Active Member of Clubs or Organizations: Not on file      Attends Club or Organization Meetings: Not on file      Marital Status: Not on file   Interpersonal Safety: Low Risk  (4/11/2024)    Interpersonal Safety      Do you feel physically and emotionally safe where you currently live?: Yes      Within the past 12 months, have you been hit, slapped, kicked or otherwise physically hurt by someone?: No      Within the past 12 months, have you been humiliated or emotionally abused in other ways by your partner or ex-partner?: No   Housing Stability: Low Risk  (4/4/2024)    Housing Stability      Do you have housing? : Yes      Are you worried about losing your housing?: No       Physical Exam:  /82 (BP Location: Right arm)   Pulse 77   Ht 1.854 m (6' 1\")   Wt 106.1 kg (234 lb)   SpO2 96%   BMI 30.87 kg/m    GENERAL: alert and no distress  EYES: Eyes grossly normal to inspection.  No discharge or erythema, or obvious scleral/conjunctival abnormalities.  RESP: No audible wheeze, cough, or visible cyanosis.    SKIN: Visible skin clear. No significant rash, abnormal pigmentation or lesions.  NEURO: Cranial nerves grossly intact.  Mentation and speech appropriate for age.  PSYCH: Appropriate affect, tone, and pace of words    Laboratory/Imaging Studies  No results found for any visits on 10/14/24.    ASSESSMENT      Gagandeep states that he is doing well at this visit. He has no concerns with his health, but does note ongoing, stable, anxiety. He did share that his brother was recently diagnosed with breast cancer at age 67. He does not know if his brother has had genetic testing.     We " discussed the screening plan below. He is following closely with Dr. Burns. I have ordered a gastrin and pancreatic polypeptide, otherwise Gagandeep is up to date on MEN1 screening. We discussed that the labs should be drawn on an empty stomach, and he should not drink alcohol for 24 hours prior to completing them.       INDIVIDUALIZED CANCER RISK MANAGEMENT PLAN:      Individualized Surveillance Plan for Children and Adults with Multiple Endocrine Neoplasia Syndrome (MEN1)  based on NCCN Guidelines Version 1.2023, Cirilo 2012 and Kristofer et al 2017   Clinical evaluation Surveillance Last done Next Due    Start at age 5     Pituitary MRI of pituitary every 3-5 years, starting at age 5.    Annual plasma prolactin, starting at age 5.    Annual IGF-I, starting at age 5. 4/29/2024: MRI pituitary: Stable questionable area of subtle hypoenhancement in the posterior aspect of the adenohypophysis.     8/19/2024: prolactin, 8 (WNL)    8/19/2024: IGF-1, 196 (WNL)     Pituitary MRI due in  2025    Repeat labs in August, 2025        Start at age 8     Parathyroid   Annual plasma calcium, starting at age 8.    Annual PTH, starting at age 8.   8/19/2024: PTH, 34 (WNL)    5/6/2024: Calcium, 4.8 (WNL) Repeat labs with Dr. Burns in August, 2025    Start at age 10     Adrenal and PanNET screening MRI or CT of abdomen, starting prior to age 10.      Repeat every 1-3 years.    Annual chromogranin-A, starting prior to age 10.    Annual pancreatic polypeptide, starting prior to age 10.  8/22/2024: MRI Abdomen, Tiny 3 mm cystic lesion in the tail of the pancreas, favored to represent a sidebranch IPMN.    8/19/2024: Chromogranin A, 56 (WNL)    10/20/2023: Pancreatic polypeptide, 1040 (elevated)     Labs and repeat MRI per Dr. Burns    Pancreatic polypeptide soon    Start at age 15     Thymic or Bronchial Carcinoid CT or MRI of chest, starting at age 15. Repeat every 1-2 years. 12/8/2023: Chest MRI, normal Consider repeating  chest MRI in December 2025    Start at age 20     PanNET Annual serum gastrin, starting at age 20 10/20/2023: Gastrin, <10 (WNL) Gastrin soon   GastricNET Endoscopic ultrasound every 3 years in those with hypergastrinemia.           I spent a total of 47 minutes on the day of the visit. Please see the note for further information on patient assessment and treatment.     Dalia Higgins DNP, ANGELO, [facility]  Clinical Nurse Specialist  Cancer Risk Management Program  [facility]    Cc:  MD Danielle Cano MD              Again, thank you for allowing me to participate in the care of your patient.        Sincerely,        ANGELO Richardson CNP

## 2024-10-14 NOTE — NURSING NOTE
"Oncology Rooming Note    October 14, 2024 2:24 PM   Gagandeep Oswald is a 49 year old male who presents for:    Chief Complaint   Patient presents with    Oncology Clinic Visit     Initial Vitals: /82 (BP Location: Right arm)   Pulse 77   Ht 1.854 m (6' 1\")   Wt 106.1 kg (234 lb)   SpO2 96%   BMI 30.87 kg/m   Estimated body mass index is 30.87 kg/m  as calculated from the following:    Height as of this encounter: 1.854 m (6' 1\").    Weight as of this encounter: 106.1 kg (234 lb). Body surface area is 2.34 meters squared.  No Pain (0) Comment: Data Unavailable   No LMP for male patient.  Allergies reviewed: Yes  Medications reviewed: Yes    Medications: Medication refills not needed today.  Pharmacy name entered into [facility] [employer] [facility]: [id number] [place] [id number]    Frailty Screening:   Is the patient here for a new oncology consult visit in cancer care? 2. No      Clinical concerns: Patient will discuss concerns with provider.       Anuj Tracy MA            "

## 2025-02-26 NOTE — LETTER
----- Message from RIGOBERTO Miranda CNP sent at 2/26/2025  1:55 PM EST -----  Looks ok; no clots   8/7/2024      Gagandeep Oswald  [address] [place] [address]      Dear Colleague,    Thank you for referring your patient, Gagandeep Oswald, to the [facility]. Please see a copy of my visit note below.    Gagandeep Oswald is a pleasant 49 year old year old male patient here today for rash on hands present for over one year. He notes blistering, scaling, fissuring comes and goes. He has tried cortisone. He make his own soap and uses aquaphor. Patient has no other skin complaints today.  Remainder of the HPI, Meds, PMH, Allergies, FH, and SH was reviewed in chart.   Past Medical History:   Diagnosis Date     Facial nerve palsy     LT congenital      Hyperlipidemia LDL goal < 160 05/07/2012     Kidney stone 09/20/2021     MEN1 (multiple endocrine neoplasia) (H)      Nephrolithiasis     has had it 3 times-last one was in 2016     New onset atrial fibrillation (H) 05/17/2021     Varicocele     LT       Past Surgical History:   Procedure Laterality Date     COLONOSCOPY N/A 6/13/2022    Procedure: COLONOSCOPY, FLEXIBLE, WITH LESION REMOVAL USING SNARE;  Surgeon: Mukund Hayes MD;  Location: [facility] OR     COLONOSCOPY WITH CO2 INSUFFLATION N/A 6/13/2022    Procedure: COLONOSCOPY, WITH CO2 INSUFFLATION;  Surgeon: Mukund Hayes MD;  Location: [facility]     ENDOSCOPIC SINUS SURGERY  1/24/2013    Procedure: ENDOSCOPIC SINUS SURGERY;  Bilateral total ethmoidectomy, bilateral endoscopic maxillary antrostomy;  Surgeon: Han Toledo MD;  Location: [facility] OR     ENT SURGERY  1/24/2013    Surgery to correct sinus infection     MOUTH SURGERY  6/2012    [place] Teeth Removed     VASECTOMY          Family History   Problem Relation Age of Onset     Allergies Mother      Arthritis Mother      Breast Cancer Mother 70     Diabetes Mother      Obesity Mother      Pancreatic Cancer Mother 80     Endometrial Cancer Mother 82     C.A.D. Father 82        MI     Prostate Cancer  Father 77     Kidney Cancer Father 83     Hypertension Father      Skin Cancer Father 74     Cancer Father 85        urethra; mets to liver,  at 87     Obesity Sister      Multiple Endocrine Neoplasia Type 1 Sister      Stomach Cancer Sister         3 occurrences     Basal cell carcinoma Sister 63     Bladder Cancer Sister 44        recurrences at 51 and 60     Breast Cancer Sister 63     Squamous cell carcinoma Sister 68     Colon Polyps Sister      Obesity Sister      Hypercalcemia Sister      Thyroid Disease Sister      Colon Polyps Sister         2-5     Anxiety Disorder Sister      Obesity Sister      Obesity Brother      Diabetes Brother      Colon Polyps Brother         10-11     Basal cell carcinoma Brother 64     Breast Cancer Maternal Grandmother 92         at 99     Brain Cancer Maternal Uncle 72         shortly thereafter     Colon Cancer No family hx of        Social History     Socioeconomic History     Marital status:      Spouse name: Not on file     Number of children: 1     Years of education: 16     Highest education level: Not on file   Occupational History     Occupation: IT      Employer: [employer]   Tobacco Use     Smoking status: Former     Current packs/day: 0.00     Average packs/day: 0.5 packs/day for 3.0 years (1.5 ttl pk-yrs)     Types: Cigarettes     Start date: 2002     Quit date: 2005     Years since quittin.6     Smokeless tobacco: Never   Vaping Use     Vaping status: Never Used   Substance and Sexual Activity     Alcohol use: Yes     Alcohol/week: 8.3 standard drinks of alcohol     Comment: 2 drinks a week     Drug use: No     Sexual activity: Yes     Partners: Female     Birth control/protection: Male Surgical   Other Topics Concern     Parent/sibling w/ CABG, MI or angioplasty before 65F 55M? No   Social History Narrative     Not on file     Social Determinants of Health     Financial Resource Strain: Low Risk  (2024)    Financial Resource  Strain      Within the past 12 months, have you or your family members you live with been unable to get utilities (heat, electricity) when it was really needed?: No   Food Insecurity: Low Risk  (4/4/2024)    Food Insecurity      Within the past 12 months, did you worry that your food would run out before you got money to buy more?: No      Within the past 12 months, did the food you bought just not last and you didn t have money to get more?: No   Transportation Needs: Low Risk  (4/4/2024)    Transportation Needs      Within the past 12 months, has lack of transportation kept you from medical appointments, getting your medicines, non-medical meetings or appointments, work, or from getting things that you need?: No   Physical Activity: Sufficiently Active (4/4/2024)    Exercise Vital Sign      Days of Exercise per Week: 4 days      Minutes of Exercise per Session: 50 min   Stress: Stress Concern Present (4/4/2024)    [place] [employer] - Occupational Stress Questionnaire      Feeling of Stress : Rather much   Social Connections: Unknown (4/4/2024)    Social Connection and Isolation Panel [NHANES]      Frequency of Communication with Friends and Family: Not on file      Frequency of Social Gatherings with Friends and Family: Never      Attends Pentecostal Services: Not on file      Active Member of Clubs or Organizations: Not on file      Attends Club or Organization Meetings: Not on file      Marital Status: Not on file   Interpersonal Safety: Low Risk  (4/11/2024)    Interpersonal Safety      Do you feel physically and emotionally safe where you currently live?: Yes      Within the past 12 months, have you been hit, slapped, kicked or otherwise physically hurt by someone?: No      Within the past 12 months, have you been humiliated or emotionally abused in other ways by your partner or ex-partner?: No   Housing Stability: Low Risk  (4/4/2024)    Housing Stability      Do you have housing? : Yes       Are you worried about losing your housing?: No       Outpatient Encounter Medications as of 8/7/2024   Medication Sig Dispense Refill     clobetasol propionate (TEMOVATE) 0.05 % external cream Apply twice daily daily to rash. 60 g 4     hydrocortisone 2.5 % cream Apply topically 2 times daily 30 g 0     tacrolimus (PROTOPIC) 0.1 % external ointment Apply every other day with clobetasol to rash on hands. 30 g 4     co-enzyme Q-10 100 MG CAPS capsule        fish oil-omega-3 fatty acids 1000 MG capsule        hydrochlorothiazide (HYDRODIURIL) 25 MG tablet Take 1 tablet (25 mg) by mouth daily 90 tablet 2     Multiple Vitamin (MULTIVITAMINS PO) Take  by mouth.       No facility-administered encounter medications on file as of 8/7/2024.             O:   NAD, WDWN, Alert & Oriented, Mood & Affect wnl, Vitals stable   Here today alone   There were no vitals taken for this visit.   General appearance normal   Vitals stable   Alert, oriented and in no acute distress      Vesicular, scaly fissuring on hands       Eyes: Conjunctivae/lids:Normal     ENT: Lips normal    MSK:Normal    Pulm: Breathing Normal    Neuro/Psych: Orientation:Alert and Orientedx3 ; Mood/Affect:normal   A/P:  1. Dyshidrotic eczema  Recommend vanicream products.   Apply clobetasol twice daily for 2-3 weeks then after improving start alternating tacrolimus. Use clobetasol three times weekly.   If flaring increase use of clobetasol.   If not controlled in the next two months, send mychart then I send in for dupixent.   Skin care regimen reviewed with patient: Eliminate harsh soaps, i.e. Dial, zest, irsih spring; Mild soaps such as Cetaphil or Dove sensitive skin, avoid hot or cold showers, aggressive use of emollients including vanicream, cetaphil or cerave discussed with patient.    Return to clinic if not controlled.       Again, thank you for allowing me to participate in the care of your patient.        Sincerely,        Elli Greene PA-C

## 2025-04-10 SDOH — HEALTH STABILITY: PHYSICAL HEALTH: ON AVERAGE, HOW MANY MINUTES DO YOU ENGAGE IN EXERCISE AT THIS LEVEL?: 60 MIN

## 2025-04-10 SDOH — HEALTH STABILITY: PHYSICAL HEALTH: ON AVERAGE, HOW MANY DAYS PER WEEK DO YOU ENGAGE IN MODERATE TO STRENUOUS EXERCISE (LIKE A BRISK WALK)?: 2 DAYS

## 2025-04-10 ASSESSMENT — SOCIAL DETERMINANTS OF HEALTH (SDOH): HOW OFTEN DO YOU GET TOGETHER WITH FRIENDS OR RELATIVES?: NEVER

## 2025-04-14 ENCOUNTER — OFFICE VISIT (OUTPATIENT)
Dept: FAMILY MEDICINE | Facility: CLINIC | Age: 50
End: 2025-04-14
Attending: FAMILY MEDICINE
Payer: COMMERCIAL

## 2025-04-14 VITALS
HEIGHT: 73 IN | SYSTOLIC BLOOD PRESSURE: 124 MMHG | TEMPERATURE: 97.3 F | HEART RATE: 76 BPM | OXYGEN SATURATION: 97 % | DIASTOLIC BLOOD PRESSURE: 78 MMHG | WEIGHT: 240 LBS | BODY MASS INDEX: 31.81 KG/M2 | RESPIRATION RATE: 16 BRPM

## 2025-04-14 DIAGNOSIS — E31.21 MULTIPLE ENDOCRINE NEOPLASIA (MEN) TYPE I (H): ICD-10-CM

## 2025-04-14 DIAGNOSIS — Z83.719 FAMILY HISTORY OF COLONIC POLYPS: ICD-10-CM

## 2025-04-14 DIAGNOSIS — F41.9 ANXIETY: Chronic | ICD-10-CM

## 2025-04-14 DIAGNOSIS — D35.2 PITUITARY ADENOMA (H): ICD-10-CM

## 2025-04-14 DIAGNOSIS — K86.2 PANCREATIC CYST: ICD-10-CM

## 2025-04-14 DIAGNOSIS — Z12.5 SCREENING FOR PROSTATE CANCER: ICD-10-CM

## 2025-04-14 DIAGNOSIS — I48.0 PAF (PAROXYSMAL ATRIAL FIBRILLATION) (H): Primary | Chronic | ICD-10-CM

## 2025-04-14 DIAGNOSIS — E78.5 HYPERLIPIDEMIA WITH TARGET LDL LESS THAN 160: ICD-10-CM

## 2025-04-14 DIAGNOSIS — E66.811 CLASS 1 OBESITY DUE TO EXCESS CALORIES WITHOUT SERIOUS COMORBIDITY WITH BODY MASS INDEX (BMI) OF 31.0 TO 31.9 IN ADULT: ICD-10-CM

## 2025-04-14 DIAGNOSIS — R82.994 HYPERCALCIURIA: ICD-10-CM

## 2025-04-14 DIAGNOSIS — E66.09 CLASS 1 OBESITY DUE TO EXCESS CALORIES WITHOUT SERIOUS COMORBIDITY WITH BODY MASS INDEX (BMI) OF 31.0 TO 31.9 IN ADULT: ICD-10-CM

## 2025-04-14 DIAGNOSIS — G47.33 OSA (OBSTRUCTIVE SLEEP APNEA): Chronic | ICD-10-CM

## 2025-04-14 DIAGNOSIS — Z00.00 ROUTINE GENERAL MEDICAL EXAMINATION AT A HEALTH CARE FACILITY: ICD-10-CM

## 2025-04-14 DIAGNOSIS — E21.0 PRIMARY HYPERPARATHYROIDISM: ICD-10-CM

## 2025-04-14 LAB
CHOLEST SERPL-MCNC: 225 MG/DL
FASTING STATUS PATIENT QL REPORTED: YES
HDLC SERPL-MCNC: 52 MG/DL
LDLC SERPL CALC-MCNC: 147 MG/DL
NONHDLC SERPL-MCNC: 173 MG/DL
PSA SERPL DL<=0.01 NG/ML-MCNC: 0.44 NG/ML (ref 0–3.5)
TRIGL SERPL-MCNC: 128 MG/DL

## 2025-04-14 PROCEDURE — 91320 SARSCV2 VAC 30MCG TRS-SUC IM: CPT | Performed by: FAMILY MEDICINE

## 2025-04-14 PROCEDURE — 36415 COLL VENOUS BLD VENIPUNCTURE: CPT | Performed by: FAMILY MEDICINE

## 2025-04-14 PROCEDURE — 99396 PREV VISIT EST AGE 40-64: CPT | Performed by: FAMILY MEDICINE

## 2025-04-14 PROCEDURE — G0103 PSA SCREENING: HCPCS | Performed by: FAMILY MEDICINE

## 2025-04-14 PROCEDURE — 3074F SYST BP LT 130 MM HG: CPT | Performed by: FAMILY MEDICINE

## 2025-04-14 PROCEDURE — 80061 LIPID PANEL: CPT | Performed by: FAMILY MEDICINE

## 2025-04-14 PROCEDURE — 90480 ADMN SARSCOV2 VAC 1/ONLY CMP: CPT | Performed by: FAMILY MEDICINE

## 2025-04-14 PROCEDURE — 1125F AMNT PAIN NOTED PAIN PRSNT: CPT | Performed by: FAMILY MEDICINE

## 2025-04-14 PROCEDURE — 3078F DIAST BP <80 MM HG: CPT | Performed by: FAMILY MEDICINE

## 2025-04-14 RX ORDER — MAG HYDROX/ALUMINUM HYD/SIMETH 400-400-40
450 SUSPENSION, ORAL (FINAL DOSE FORM) ORAL DAILY
COMMUNITY
Start: 2024-05-01

## 2025-04-14 ASSESSMENT — PAIN SCALES - GENERAL: PAINLEVEL_OUTOF10: MILD PAIN (1)

## 2025-04-14 NOTE — PATIENT INSTRUCTIONS
Patient Education   Preventive Care Advice   This is general advice given by our system to help you stay healthy. However, your care team may have specific advice just for you. Please talk to your care team about your preventive care needs.  Nutrition  Eat 5 or more servings of fruits and vegetables each day.  Try wheat bread, brown rice and whole grain pasta (instead of white bread, rice, and pasta).  Get enough calcium and vitamin D. Check the label on foods and aim for 100% of the RDA (recommended daily allowance).  Lifestyle  Exercise at least 150 minutes each week  (30 minutes a day, 5 days a week).  Do muscle strengthening activities 2 days a week. These help control your weight and prevent disease.  No smoking.  Wear sunscreen to prevent skin cancer.  Have a dental exam and cleaning every 6 months.  Yearly exams  See your health care team every year to talk about:  Any changes in your health.  Any medicines your care team has prescribed.  Preventive care, family planning, and ways to prevent chronic diseases.  Shots (vaccines)   HPV shots (up to age 26), if you've never had them before.  Hepatitis B shots (up to age 59), if you've never had them before.  COVID-19 shot: Get this shot when it's due.  Flu shot: Get a flu shot every year.  Tetanus shot: Get a tetanus shot every 10 years.  Pneumococcal, hepatitis A, and RSV shots: Ask your care team if you need these based on your risk.  Shingles shot (for age 50 and up)  General health tests  Diabetes screening:  Starting at age 35, Get screened for diabetes at least every 3 years.  If you are younger than age 35, ask your care team if you should be screened for diabetes.  Cholesterol test: At age 39, start having a cholesterol test every 5 years, or more often if advised.  Bone density scan (DEXA): At age 50, ask your care team if you should have this scan for osteoporosis (brittle bones).  Hepatitis C: Get tested at least once in your life.  STIs (sexually  transmitted infections)  Before age 24: Ask your care team if you should be screened for STIs.  After age 24: Get screened for STIs if you're at risk. You are at risk for STIs (including HIV) if:  You are sexually active with more than one person.  You don't use condoms every time.  You or a partner was diagnosed with a sexually transmitted infection.  If you are at risk for HIV, ask about PrEP medicine to prevent HIV.  Get tested for HIV at least once in your life, whether you are at risk for HIV or not.  Cancer screening tests  Cervical cancer screening: If you have a cervix, begin getting regular cervical cancer screening tests starting at age 21.  Breast cancer scan (mammogram): If you've ever had breasts, begin having regular mammograms starting at age 40. This is a scan to check for breast cancer.  Colon cancer screening: It is important to start screening for colon cancer at age 45.  Have a colonoscopy test every 10 years (or more often if you're at risk) Or, ask your provider about stool tests like a FIT test every year or Cologuard test every 3 years.  To learn more about your testing options, visit:   .  For help making a decision, visit:   https://bit.ly/bb55825.  Prostate cancer screening test: If you have a prostate, ask your care team if a prostate cancer screening test (PSA) at age 55 is right for you.  Lung cancer screening: If you are a current or former smoker ages 50 to 80, ask your care team if ongoing lung cancer screenings are right for you.  For informational purposes only. Not to replace the advice of your health care provider. Copyright   2023 [facility]. All rights reserved. Clinically reviewed by the [facility]. Breather 091138 - REV 01/24.  Relationships for Good Health  Relationships are important for our health and happiness. Social isolation, loneliness and lack of support are bad for your health. Studies show that loneliness can harm health  and limit your life span as much as high blood pressure and smoking.   Take some time to reflect on your relationships. Then answer these questions:  Are there people in your life that cause you stress or drain your energy? What can you do to set limits?  ________________________________________________________________________________________________________________________________________________________________________________________________________________________________________________________________________________________________________________________________________________  Who do you enjoy spending time with? Who can you go to for support?  ________________________________________________________________________________________________________________________________________________________________________________________________________________________________________________________________________________________________________________________________________________  What can you do to improve your relationships with others?  __________________________________________________________________________________________________________________________________________________________________________________________________________________  ______________________________________________________________________________________________________________________________  What do you like most about your relationships with others?  ________________________________________________________________________________________________________________________________________________________________________________________________________________________________________________________________________________________________________________________________________________  My goal: ______________________________________________________________________  I will:  ______________________________________________________________________________________________________________________________________________________________________________________________    For informational purposes only. Not to replace the advice of your health care provider. Copyright   2018 [facility]. All rights reserved. Clinically reviewed by [facility]. Compliance 11 738422 - Rev 06/24.  Learning About Stress  What is stress?     Stress is your body's response to a hard situation. Your body can have a physical, emotional, or mental response. Stress is a fact of life for most people, and it affects everyone differently. What causes stress for you may not be stressful for someone else.  A lot of things can cause stress. You may feel stress when you go on a job interview, take a test, or run a race. This kind of short-term stress is normal and even useful. It can help you if you need to work hard or react quickly. For example, stress can help you finish an important job on time.  Long-term stress is caused by ongoing stressful situations or events. Examples of long-term stress include long-term health problems, ongoing problems at work, or conflicts in your family. Long-term stress can harm your health.  How does stress affect your health?  When you are stressed, your body responds as though you are in danger. It makes hormones that speed up your heart, make you breathe faster, and give you a burst of energy. This is called the fight-or-flight stress response. If the stress is over quickly, your body goes back to normal and no harm is done.  But if stress happens too often or lasts too long, it can have bad effects. Long-term stress can make you more likely to get sick, and it can make symptoms of some diseases worse. If you tense up when you are stressed, you may develop neck, shoulder, or low back pain. Stress is linked to high blood pressure and heart disease.  Stress also  harms your emotional health. It can make you rodriguez, tense, or depressed. Your relationships may suffer, and you may not do well at work or school.  What can you do to manage stress?  You can try these things to help manage stress:   Do something active. Exercise or activity can help reduce stress. Walking is a great way to get started. Even everyday activities such as housecleaning or yard work can help.  Try yoga or sun chi. These techniques combine exercise and meditation. You may need some training at first to learn them.  Do something you enjoy. For example, listen to music or go to a movie. Practice your hobby or do volunteer work.  Meditate. This can help you relax, because you are not worrying about what happened before or what may happen in the future.  Do guided imagery. Imagine yourself in any setting that helps you feel calm. You can use online videos, books, or a teacher to guide you.  Do breathing exercises. For example:  From a standing position, bend forward from the waist with your knees slightly bent. Let your arms dangle close to the floor.  Breathe in slowly and deeply as you return to a standing position. Roll up slowly and lift your head last.  Hold your breath for just a few seconds in the standing position.  Breathe out slowly and bend forward from the waist.  Let your feelings out. Talk, laugh, cry, and express anger when you need to. Talking with supportive friends or family, a counselor, or a bam leader about your feelings is a healthy way to relieve stress. Avoid discussing your feelings with people who make you feel worse.  Write. It may help to write about things that are bothering you. This helps you find out how much stress you feel and what is causing it. When you know this, you can find better ways to cope.  What can you do to prevent stress?  You might try some of these things to help prevent stress:  Manage your time. This helps you find time to do the things you want and need to  "do.  Get enough sleep. Your body recovers from the stresses of the day while you are sleeping.  Get support. Your family, friends, and community can make a difference in how you experience stress.  Limit your news feed. Avoid or limit time on social media or news that may make you feel stressed.  Do something active. Exercise or activity can help reduce stress. Walking is a great way to get started.  Where can you learn more?  Go to https://www.[employer]juke.net/patiented  Enter N032 in the search box to learn more about \"Learning About Stress.\"  Current as of: October 24, 2024  Content Version: 14.4    9933-3170 [employer].   Care instructions adapted under license by your healthcare professional. If you have questions about a medical condition or this instruction, always ask your healthcare professional. [employer] disclaims any warranty or liability for your use of this information.    Substance Use Disorder: Care Instructions  Overview     You can improve your life and health by stopping your use of alcohol or drugs. When you don't drink or use drugs, you may feel and sleep better. You may get along better with your family, friends, and coworkers. There are medicines and programs that can help with substance use disorder.  How can you care for yourself at home?  Here are some ways to help you stay sober and prevent relapse.  If you have been given medicine to help keep you sober or reduce your cravings, be sure to take it exactly as prescribed.  Talk to your doctor about programs that can help you stop using drugs or drinking alcohol.  Do not keep alcohol or drugs in your home.  Plan ahead. Think about what you'll say if other people ask you to drink or use drugs. Try not to spend time with people who drink or use drugs.  Use the time and money spent on drinking or drugs to do something that's important to you.  Preventing a relapse  Have a plan to deal with relapse. Learn to recognize " changes in your thinking that lead you to drink or use drugs. Get help before you start to drink or use drugs again.  Try to stay away from situations, friends, or places that may lead you to drink or use drugs.  If you feel the need to drink alcohol or use drugs again, seek help right away. Call a trusted friend or family member. Some people get support from organizations such as Narcotics Anonymous or [employer] or from treatment facilities.  If you relapse, get help as soon as you can. Some people make a plan with another person that outlines what they want that person to do for them if they relapse. The plan usually includes how to handle the relapse and who to notify in case of relapse.  Don't give up. Remember that a relapse doesn't mean that you have failed. Use the experience to learn the triggers that lead you to drink or use drugs. Then quit again. Recovery is a lifelong process. Many people have several relapses before they are able to quit for good.  Follow-up care is a key part of your treatment and safety. Be sure to make and go to all appointments, and call your doctor if you are having problems. It's also a good idea to know your test results and keep a list of the medicines you take.  When should you call for help?   Call 911  anytime you think you may need emergency care. For example, call if you or someone else:    Has overdosed or has withdrawal signs. Be sure to tell the emergency workers that you are or someone else is using or trying to quit using drugs. Overdose or withdrawal signs may include:  Losing consciousness.  Seizure.  Seeing or hearing things that aren't there (hallucinations).     Is thinking or talking about suicide or harming others.   Where to get help 24 hours a day, 7 days a week   If you or someone you know talks about suicide, self-harm, a mental health crisis, a substance use crisis, or any other kind of emotional distress, get help right away. You can:    Call the  "Suicide and Crisis Lifeline at 988.     Call 1-247-655-MLBQ (1-215.398.6617).     Text HOME to 066494 to access the Crisis Text Line.   Consider saving these numbers in your phone.  Go to [employer] for more information or to chat online.  Call your doctor now or seek immediate medical care if:    You are having withdrawal symptoms. These may include nausea or vomiting, sweating, shakiness, and anxiety.   Watch closely for changes in your health, and be sure to contact your doctor if:    You have a relapse.     You need more help or support to stop.   Where can you learn more?  Go to https://www.[employer].net/patiented  Enter H573 in the search box to learn more about \"Substance Use Disorder: Care Instructions.\"  Current as of: August 20, 2024  Content Version: 14.4    7584-2260 [employer].   Care instructions adapted under license by your healthcare professional. If you have questions about a medical condition or this instruction, always ask your healthcare professional. [employer] disclaims any warranty or liability for your use of this information.       "

## 2025-04-14 NOTE — PROGRESS NOTES
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)  Sees endocrine     ALAYNA (obstructive sleep apnea)  Stable     Class 1 obesity due to excess calories without serious comorbidity with body mass index (BMI) of 31.0 to 31.9 in adult  Low minerva diet and Exercise Guidelines discussed     Anxiety  Stable     Family history of colonic polyps  UTD with colonoscopy     Pituitary adenoma (H)  Sees endo     Screening for prostate cancer  Discussed   - PSA, screen; Future  - PSA, screen    Pt see Endocrine for Primary Hyperparathyroidism and Hypercalciuria  Also has elevated Glucagon levels  Notes reviewed   Pt also has had genetic counseling  Notes reviewed   He has the MEN Type 1 and is being followed By endocrine and getting all screening test done f  Notes reviewed endocrine and Genetic oncology        BMI  Estimated body mass index is 31.6 kg/m  as calculated from the following:    Height as of this encounter: 1.856 m (6' 1.07\").    Weight as of this encounter: 108.9 kg (240 lb).   Weight management plan: Discussed healthy diet and exercise guidelines    Counseling  Appropriate preventive services were addressed with this patient via screening, questionnaire, or discussion as appropriate for fall prevention, nutrition, physical activity, Tobacco-use cessation, social engagement, weight loss and cognition.  Checklist reviewing preventive services available has been given to the patient.  Reviewed patient's diet, addressing concerns and/or questions.   He is at risk for lack of exercise and has been provided with information " to increase physical activity for the benefit of his well-being.   Patient is at risk for social isolation and has been provided with information about the benefit of social connection.   He is at risk for psychosocial distress and has been provided with information to reduce risk.       Regular exercise    Subjective   Gagandeep is a 50 year old, presenting for the following:  Physical        4/14/2025    10:26 AM   Additional Questions   Roomed by Ruchi KENT  Pt here for Physical and check on medical conditions   Pt  uses a dental device for sleep apnea and doing well  Also sees Endocrine for his medical conditions and doing well  Anxiety is stable     Advance Care Planning  Patient does not have a Health Care Directive: Discussed advance care planning with patient; information given to patient to review.      4/10/2025   General Health   How would you rate your overall physical health? Good   Feel stress (tense, anxious, or unable to sleep) To some extent   (!) STRESS CONCERN      4/10/2025   Nutrition   Three or more servings of calcium each day? Yes   Diet: Regular (no restrictions)    Carbohydrate counting   How many servings of fruit and vegetables per day? (!) 2-3   How many sweetened beverages each day? 0-1       Multiple values from one day are sorted in reverse-chronological order         4/10/2025   Exercise   Days per week of moderate/strenous exercise 2 days   Average minutes spent exercising at this level 60 min   (!) EXERCISE CONCERN      4/10/2025   Social Factors   Frequency of gathering with friends or relatives Never   Worry food won't last until get money to buy more No   Food not last or not have enough money for food? No   Do you have housing? (Housing is defined as stable permanent housing and does not include staying ouside in a car, in a tent, in an abandoned building, in an overnight shelter, or couch-surfing.) Yes   Are you worried about losing your housing? No   Lack of  transportation? No   Unable to get utilities (heat,electricity)? No   (!) SOCIAL CONNECTIONS CONCERN      4/10/2025   Fall Risk   Fallen 2 or more times in the past year? No   Trouble with walking or balance? No          4/10/2025   Dental   Dentist two times every year? Yes           2024   TB Screening   Were you born outside of the [place]? No           Today's PHQ-2 Score:       2025     9:52 PM   PHQ-2 (  [employer])   Q1: Little interest or pleasure in doing things 1   Q2: Feeling down, depressed or hopeless 0   PHQ-2 Score 1    Q1: Little interest or pleasure in doing things Several days   Q2: Feeling down, depressed or hopeless Not at all   PHQ-2 Score 1       Patient-reported           4/10/2025   Substance Use   Alcohol more than 3/day or more than 7/wk No   Do you use any other substances recreationally? (!) CANNABIS PRODUCTS     Social History     Tobacco Use    Smoking status: Former     Current packs/day: 0.00     Average packs/day: 0.5 packs/day for 3.0 years (1.5 ttl pk-yrs)     Types: Cigarettes     Start date: 2002     Quit date: 2005     Years since quittin.2    Smokeless tobacco: Never   Vaping Use    Vaping status: Never Used   Substance Use Topics    Alcohol use: Yes     Alcohol/week: 8.3 standard drinks of alcohol     Comment: 2 drinks a week    Drug use: Yes     Types: Marijuana     Comment: trying low-dose THC/CBD gummies for anxiety           4/10/2025   STI Screening   New sexual partner(s) since last STI/HIV test? No   ASCVD Risk   The 10-year ASCVD risk score (Ellen JANE, et al., 2019) is: 5.1%    Values used to calculate the score:      Age: 50 years      Sex: Male      Is Non- : No      Diabetic: No      Tobacco smoker: No      Systolic Blood Pressure: 124 mmHg      Is BP treated: Yes      HDL Cholesterol: 54 mg/dL      Total Cholesterol: 242 mg/dL           Reviewed and updated as needed this visit by Provider                    Past  Medical History:   Diagnosis Date    Facial nerve palsy     LT congenital     Hyperlipidemia LDL goal < 160 05/07/2012    Kidney stone 09/20/2021    MEN1 (multiple endocrine neoplasia) (H)     Nephrolithiasis     has had it 3 times-last one was in 2016    New onset atrial fibrillation (H) 05/17/2021    Varicocele     LT     Past Surgical History:   Procedure Laterality Date    COLONOSCOPY N/A 6/13/2022    Procedure: COLONOSCOPY, FLEXIBLE, WITH LESION REMOVAL USING SNARE;  Surgeon: Mukund Hayes MD;  Location: [facility] OR    COLONOSCOPY WITH CO2 INSUFFLATION N/A 6/13/2022    Procedure: COLONOSCOPY, WITH CO2 INSUFFLATION;  Surgeon: Mukund Hayes MD;  Location: [facility] OR    ENDOSCOPIC SINUS SURGERY  1/24/2013    Procedure: ENDOSCOPIC SINUS SURGERY;  Bilateral total ethmoidectomy, bilateral endoscopic maxillary antrostomy;  Surgeon: Han Toledo MD;  Location: [facility] OR    ENT SURGERY  1/24/2013    Surgery to correct sinus infection    MOUTH SURGERY  6/2012    [place] Teeth Removed    VASECTOMY       OB History   No obstetric history on file.     Lab work is in process  Labs reviewed in EPIC  BP Readings from Last 3 Encounters:   04/14/25 124/78   10/14/24 128/82   08/27/24 (!) 122/96    Wt Readings from Last 3 Encounters:   04/14/25 108.9 kg (240 lb)   10/14/24 106.1 kg (234 lb)   08/27/24 104.8 kg (231 lb)                  Patient Active Problem List   Diagnosis    Hyperlipidemia with target LDL less than 160    History of sinus surgery    PAF (paroxysmal atrial fibrillation) (H)    Family history of colonic polyps    Anxiety    Class 1 obesity due to excess calories without serious comorbidity with body mass index (BMI) of 31.0 to 31.9 in adult    ALAYNA (obstructive sleep apnea)    Monoallelic mutation of MEN1 gene    High serum glucagon    Pituitary lesion    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)    Pituitary adenoma (H)     Past Surgical History:   Procedure Laterality Date    COLONOSCOPY N/A  2022    Procedure: COLONOSCOPY, FLEXIBLE, WITH LESION REMOVAL USING SNARE;  Surgeon: Mukund Hayes MD;  Location: [facility]    COLONOSCOPY WITH CO2 INSUFFLATION N/A 2022    Procedure: COLONOSCOPY, WITH CO2 INSUFFLATION;  Surgeon: Mukund Hayes MD;  Location: [facility]    ENDOSCOPIC SINUS SURGERY  2013    Procedure: ENDOSCOPIC SINUS SURGERY;  Bilateral total ethmoidectomy, bilateral endoscopic maxillary antrostomy;  Surgeon: Han Toledo MD;  Location: [facility]    ENT SURGERY  2013    Surgery to correct sinus infection    MOUTH SURGERY  2012    [place] Teeth Removed    VASECTOMY         Social History     Tobacco Use    Smoking status: Former     Current packs/day: 0.00     Average packs/day: 0.5 packs/day for 3.0 years (1.5 ttl pk-yrs)     Types: Cigarettes     Start date: 2002     Quit date: 2005     Years since quittin.2    Smokeless tobacco: Never   Substance Use Topics    Alcohol use: Yes     Alcohol/week: 8.3 standard drinks of alcohol     Comment: 2 drinks a week     Family History   Problem Relation Age of Onset    Allergies Mother     Arthritis Mother     Breast Cancer Mother 70    Diabetes Mother     Obesity Mother     Pancreatic Cancer Mother 80    Endometrial Cancer Mother 82    C.A.D. Father 82        MI    Prostate Cancer Father 77    Kidney Cancer Father 83    Hypertension Father     Skin Cancer Father 74    Cancer Father 85        urethra; mets to liver,  at 87    Obesity Sister     Multiple Endocrine Neoplasia Type 1 Sister     Stomach Cancer Sister         3 occurrences    Basal cell carcinoma Sister 63    Bladder Cancer Sister 44        recurrences at 51 and 60    Breast Cancer Sister 63    Squamous cell carcinoma Sister 68    Colon Polyps Sister     Obesity Sister     Hypercalcemia Sister     Thyroid Disease Sister     Colon Polyps Sister         2-5    Anxiety Disorder Sister     Obesity Sister     Obesity Brother     Diabetes Brother      Colon Polyps Brother         10-11    Basal cell carcinoma Brother 64    Breast Cancer Brother 67    Breast Cancer Maternal Grandmother 92         at 99    Brain Cancer Maternal Uncle 72         shortly thereafter    Colon Cancer No family hx of          Current Outpatient Medications   Medication Sig Dispense Refill    clobetasol propionate (TEMOVATE) 0.05 % external cream Apply twice daily daily to rash. 60 g 4    co-enzyme Q-10 100 MG CAPS capsule       fish oil-omega-3 fatty acids 1000 MG capsule       HEMP OIL OR EXTRACT OR OTHER CBD CANNABINOID, NOT MEDICAL CANNABIS,       hydrochlorothiazide (HYDRODIURIL) 25 MG tablet TAKE 1 TABLET BY MOUTH EVERY DAY 90 tablet 2    Multiple Vitamin (MULTIVITAMINS PO) Take  by mouth.      saw palmetto 450 MG CAPS capsule Take 450 mg by mouth daily.      tacrolimus (PROTOPIC) 0.1 % external ointment Apply every other day with clobetasol to rash on hands. 30 g 4     Allergies   Allergen Reactions    Nickel Itching and Rash     Recent Labs   Lab Test 24  1109 24  1512 11/15/23  1522 23  1223 22  1231 22  1706 21  1045 21  1307 21  1241   A1C  --   --  5.0  --   --  5.1  --   --   --    LDL  --  159*  --  156* 122*  --   --   --  177*   HDL  --  54  --  51 50  --   --   --  63   TRIG  --  147  --  93 127  --   --   --  94   ALT 48  --   --   --   --  40  --   --   --    CR 1.02  --   --   --   --  1.18   < > 1.07 1.05   GFRESTIMATED 90  --   --   --   --  77   < > 82 84   GFRESTBLACK  --   --   --   --   --   --   --  >90 >90   POTASSIUM 4.2  --   --   --   --  4.2   < > 4.4 4.3   TSH  --   --  0.62  --   --  0.55  --  0.87 0.81    < > = values in this interval not displayed.          Review of Systems  CONSTITUTIONAL: NEGATIVE for fever, chills, change in weight  INTEGUMENTARY/SKIN: NEGATIVE for worrisome rashes, moles or lesions  EYES: NEGATIVE for vision changes or irritation  ENT/MOUTH: NEGATIVE for ear, mouth and throat  "problems  RESP: NEGATIVE for significant cough or SOB  BREAST: NEGATIVE for masses, tenderness or discharge  CV: NEGATIVE for chest pain, palpitations or peripheral edema  GI: NEGATIVE for nausea, abdominal pain, heartburn, or change in bowel habits  : NEGATIVE for frequency, dysuria, or hematuria  MUSCULOSKELETAL: NEGATIVE for significant arthralgias or myalgia  NEURO: NEGATIVE for weakness, dizziness or paresthesias  ENDOCRINE: NEGATIVE for temperature intolerance, skin/hair changes  HEME: NEGATIVE for bleeding problems  PSYCHIATRIC: NEGATIVE for changes in mood or affect     Objective    Exam  /78   Pulse 76   Temp 97.3  F (36.3  C) (Temporal)   Resp 16   Ht 1.856 m (6' 1.07\")   Wt 108.9 kg (240 lb)   SpO2 97%   BMI 31.60 kg/m     Estimated body mass index is 31.6 kg/m  as calculated from the following:    Height as of this encounter: 1.856 m (6' 1.07\").    Weight as of this encounter: 108.9 kg (240 lb).    Physical Exam  GENERAL: alert and no distress  EYES: Eyes grossly normal to inspection, PERRL and conjunctivae and sclerae normal  HENT: ear canals and TM's normal, nose and mouth without ulcers or lesions  NECK: no adenopathy, no asymmetry, masses, or scars  RESP: lungs clear to auscultation - no rales, rhonchi or wheezes  CV: regular rate and rhythm, normal S1 S2, no S3 or S4, no murmur, click or rub, no peripheral edema  ABDOMEN: soft, nontender, no hepatosplenomegaly, no masses and bowel sounds normal  MS: no gross musculoskeletal defects noted, no edema  SKIN: no suspicious lesions or rashes  NEURO: Normal strength and tone, mentation intact and speech normal  PSYCH: mentation appears normal, affect normal/bright        Signed Electronically by: Claudia Solo MD    "

## 2025-07-31 ENCOUNTER — LAB (OUTPATIENT)
Dept: LAB | Facility: CLINIC | Age: 50
End: 2025-07-31
Payer: COMMERCIAL

## 2025-07-31 DIAGNOSIS — R82.994 HYPERCALCIURIA: ICD-10-CM

## 2025-07-31 DIAGNOSIS — D35.2 PITUITARY ADENOMA (H): ICD-10-CM

## 2025-07-31 DIAGNOSIS — R79.89: ICD-10-CM

## 2025-07-31 DIAGNOSIS — Z15.89 MONOALLELIC MUTATION OF MEN1 GENE: ICD-10-CM

## 2025-07-31 DIAGNOSIS — K86.2 PANCREATIC CYST: ICD-10-CM

## 2025-07-31 DIAGNOSIS — E31.21 MEN 1 SYNDROME (H): ICD-10-CM

## 2025-07-31 DIAGNOSIS — Z15.81 MONOALLELIC MUTATION OF MEN1 GENE: ICD-10-CM

## 2025-07-31 LAB
ALBUMIN SERPL BCG-MCNC: 4.3 G/DL (ref 3.5–5.2)
ALP SERPL-CCNC: 91 U/L (ref 40–150)
ALT SERPL W P-5'-P-CCNC: 35 U/L (ref 0–70)
ANION GAP SERPL CALCULATED.3IONS-SCNC: 12 MMOL/L (ref 7–15)
AST SERPL W P-5'-P-CCNC: 36 U/L (ref 0–45)
BILIRUB SERPL-MCNC: 0.5 MG/DL
BUN SERPL-MCNC: 17 MG/DL (ref 6–20)
CALCIUM SERPL-MCNC: 9.4 MG/DL (ref 8.8–10.4)
CHLORIDE SERPL-SCNC: 103 MMOL/L (ref 98–107)
CREAT SERPL-MCNC: 1.05 MG/DL (ref 0.67–1.17)
EGFRCR SERPLBLD CKD-EPI 2021: 86 ML/MIN/1.73M2
EST. AVERAGE GLUCOSE BLD GHB EST-MCNC: 103 MG/DL
GLUCOSE SERPL-MCNC: 98 MG/DL (ref 70–99)
HBA1C MFR BLD: 5.2 % (ref 0–5.6)
HCO3 SERPL-SCNC: 23 MMOL/L (ref 22–29)
PHOSPHATE SERPL-MCNC: 2.4 MG/DL (ref 2.5–4.5)
POTASSIUM SERPL-SCNC: 3.8 MMOL/L (ref 3.4–5.3)
PROLACTIN SERPL 3RD IS-MCNC: 10 NG/ML (ref 4–15)
PROT SERPL-MCNC: 6.9 G/DL (ref 6.4–8.3)
PTH-INTACT SERPL-MCNC: 35 PG/ML (ref 15–65)
SODIUM SERPL-SCNC: 138 MMOL/L (ref 135–145)

## 2025-08-04 LAB
DEPRECATED CALCIDIOL+CALCIFEROL SERPL-MC: <44 UG/L (ref 20–75)
PANC POLYPEPT SERPL-MCNC: 1420 PG/ML
VITAMIN D2 SERPL-MCNC: <5 UG/L
VITAMIN D3 SERPL-MCNC: 39 UG/L

## 2025-08-05 ENCOUNTER — ANCILLARY PROCEDURE (OUTPATIENT)
Dept: MRI IMAGING | Facility: CLINIC | Age: 50
End: 2025-08-05
Attending: INTERNAL MEDICINE
Payer: COMMERCIAL

## 2025-08-05 DIAGNOSIS — K86.2 PANCREATIC CYST: ICD-10-CM

## 2025-08-05 LAB
CALCIUM 24H UR-MRATE: 0.24 G/SPEC (ref 0.1–0.3)
CALCIUM UR-MCNC: 13.3 MG/DL
COLLECT DURATION TIME UR: 24 H
COLLECT DURATION TIME UR: 24 H
CREAT 24H UR-MRATE: 2.03 G/SPEC (ref 0.98–2.2)
CREAT UR-MCNC: 113 MG/DL
SPECIMEN VOL UR: 1800 ML
SPECIMEN VOL UR: 1800 ML

## 2025-08-05 PROCEDURE — 74183 MRI ABD W/O CNTR FLWD CNTR: CPT | Mod: TC | Performed by: RADIOLOGY

## 2025-08-05 PROCEDURE — A9585 GADOBUTROL INJECTION: HCPCS | Performed by: RADIOLOGY

## 2025-08-05 RX ORDER — GADOBUTROL 604.72 MG/ML
11 INJECTION INTRAVENOUS ONCE
Status: COMPLETED | OUTPATIENT
Start: 2025-08-05 | End: 2025-08-05

## 2025-08-05 RX ADMIN — GADOBUTROL 11 ML: 604.72 INJECTION INTRAVENOUS at 12:45

## 2025-08-06 ENCOUNTER — ANCILLARY PROCEDURE (OUTPATIENT)
Dept: MRI IMAGING | Facility: CLINIC | Age: 50
End: 2025-08-06
Attending: INTERNAL MEDICINE
Payer: COMMERCIAL

## 2025-08-06 DIAGNOSIS — D35.2 PITUITARY ADENOMA (H): ICD-10-CM

## 2025-08-06 RX ORDER — GADOBUTROL 604.72 MG/ML
10 INJECTION INTRAVENOUS ONCE
Status: COMPLETED | OUTPATIENT
Start: 2025-08-06 | End: 2025-08-06

## 2025-08-06 RX ADMIN — GADOBUTROL 10 ML: 604.72 INJECTION INTRAVENOUS at 14:53

## 2025-08-07 ENCOUNTER — TELEPHONE (OUTPATIENT)
Dept: GASTROENTEROLOGY | Facility: CLINIC | Age: 50
End: 2025-08-07
Payer: COMMERCIAL

## 2025-08-07 ENCOUNTER — PATIENT OUTREACH (OUTPATIENT)
Dept: GASTROENTEROLOGY | Facility: CLINIC | Age: 50
End: 2025-08-07
Payer: COMMERCIAL

## 2025-08-07 DIAGNOSIS — K86.2 PANCREATIC CYST: Primary | ICD-10-CM

## 2025-08-07 DIAGNOSIS — Z15.81 MONOALLELIC MUTATION OF MEN1 GENE: ICD-10-CM

## 2025-08-07 DIAGNOSIS — Z15.89 MONOALLELIC MUTATION OF MEN1 GENE: ICD-10-CM

## 2025-08-07 LAB — GLUCAGON SERPL-MCNC: 211 NG/L

## 2025-08-08 ENCOUNTER — HOSPITAL ENCOUNTER (OUTPATIENT)
Dept: GENERAL RADIOLOGY | Facility: CLINIC | Age: 50
Discharge: HOME | End: 2025-08-08
Attending: INTERNAL MEDICINE
Payer: COMMERCIAL

## 2025-08-08 DIAGNOSIS — K86.2 PANCREATIC CYST: ICD-10-CM

## 2025-08-08 PROCEDURE — 999N000122 MR MHEALTH OVERREAD

## 2025-08-18 ENCOUNTER — TELEPHONE (OUTPATIENT)
Dept: GASTROENTEROLOGY | Facility: CLINIC | Age: 50
End: 2025-08-18
Payer: COMMERCIAL

## 2025-08-19 ENCOUNTER — ANESTHESIA EVENT (OUTPATIENT)
Dept: GASTROENTEROLOGY | Facility: CLINIC | Age: 50
End: 2025-08-19
Payer: COMMERCIAL

## 2025-08-19 ENCOUNTER — ANESTHESIA (OUTPATIENT)
Dept: GASTROENTEROLOGY | Facility: CLINIC | Age: 50
End: 2025-08-19
Payer: COMMERCIAL

## 2025-08-19 ENCOUNTER — HOSPITAL ENCOUNTER (OUTPATIENT)
Facility: CLINIC | Age: 50
Discharge: HOME | End: 2025-08-19
Attending: INTERNAL MEDICINE | Admitting: INTERNAL MEDICINE
Payer: COMMERCIAL

## 2025-08-19 VITALS
WEIGHT: 240 LBS | BODY MASS INDEX: 31.81 KG/M2 | SYSTOLIC BLOOD PRESSURE: 109 MMHG | DIASTOLIC BLOOD PRESSURE: 84 MMHG | OXYGEN SATURATION: 97 % | HEART RATE: 80 BPM | HEIGHT: 73 IN | TEMPERATURE: 98.2 F | RESPIRATION RATE: 16 BRPM

## 2025-08-19 DIAGNOSIS — K86.2 PANCREAS CYST: Primary | ICD-10-CM

## 2025-08-19 LAB
AMYLASE FLD-CCNC: 59.4 U/L
CEA FLD-MCNC: 1.4 NG/ML
GLUCOSE FLD-MCNC: 62 MG/DL
UPPER EUS: NORMAL

## 2025-08-19 PROCEDURE — 250N000009 HC RX 250

## 2025-08-19 PROCEDURE — 43242 EGD US FINE NEEDLE BX/ASPIR: CPT | Performed by: INTERNAL MEDICINE

## 2025-08-19 PROCEDURE — 88108 CYTOPATH CONCENTRATE TECH: CPT | Mod: 26 | Performed by: PATHOLOGY

## 2025-08-19 PROCEDURE — 258N000003 HC RX IP 258 OP 636

## 2025-08-19 PROCEDURE — 88305 TISSUE EXAM BY PATHOLOGIST: CPT | Mod: 26 | Performed by: PATHOLOGY

## 2025-08-19 PROCEDURE — 88173 CYTOPATH EVAL FNA REPORT: CPT | Mod: 26 | Performed by: PATHOLOGY

## 2025-08-19 PROCEDURE — 88360 TUMOR IMMUNOHISTOCHEM/MANUAL: CPT | Mod: 26 | Performed by: PATHOLOGY

## 2025-08-19 PROCEDURE — 88342 IMHCHEM/IMCYTCHM 1ST ANTB: CPT | Mod: 26 | Performed by: PATHOLOGY

## 2025-08-19 PROCEDURE — 88341 IMHCHEM/IMCYTCHM EA ADD ANTB: CPT | Mod: 26 | Performed by: PATHOLOGY

## 2025-08-19 PROCEDURE — 82378 CARCINOEMBRYONIC ANTIGEN: CPT | Performed by: INTERNAL MEDICINE

## 2025-08-19 PROCEDURE — 88313 SPECIAL STAINS GROUP 2: CPT | Mod: 26 | Performed by: PATHOLOGY

## 2025-08-19 PROCEDURE — 250N000011 HC RX IP 250 OP 636

## 2025-08-19 PROCEDURE — 88108 CYTOPATH CONCENTRATE TECH: CPT | Mod: TC | Performed by: INTERNAL MEDICINE

## 2025-08-19 PROCEDURE — 88173 CYTOPATH EVAL FNA REPORT: CPT | Mod: TC | Performed by: INTERNAL MEDICINE

## 2025-08-19 PROCEDURE — 370N000017 HC ANESTHESIA TECHNICAL FEE, PER MIN: Performed by: INTERNAL MEDICINE

## 2025-08-19 RX ORDER — FENTANYL CITRATE 50 UG/ML
INJECTION, SOLUTION INTRAMUSCULAR; INTRAVENOUS PRN
Status: DISCONTINUED | OUTPATIENT
Start: 2025-08-19 | End: 2025-08-19

## 2025-08-19 RX ORDER — DEXAMETHASONE SODIUM PHOSPHATE 4 MG/ML
4 INJECTION, SOLUTION INTRA-ARTICULAR; INTRALESIONAL; INTRAMUSCULAR; INTRAVENOUS; SOFT TISSUE
Status: DISCONTINUED | OUTPATIENT
Start: 2025-08-19 | End: 2025-08-19 | Stop reason: HOSPADM

## 2025-08-19 RX ORDER — ONDANSETRON 4 MG/1
4 TABLET, ORALLY DISINTEGRATING ORAL EVERY 30 MIN PRN
Status: DISCONTINUED | OUTPATIENT
Start: 2025-08-19 | End: 2025-08-19 | Stop reason: HOSPADM

## 2025-08-19 RX ORDER — ONDANSETRON 2 MG/ML
4 INJECTION INTRAMUSCULAR; INTRAVENOUS EVERY 30 MIN PRN
Status: DISCONTINUED | OUTPATIENT
Start: 2025-08-19 | End: 2025-08-19 | Stop reason: HOSPADM

## 2025-08-19 RX ORDER — LEVOFLOXACIN 500 MG/1
500 TABLET, FILM COATED ORAL DAILY
Qty: 5 TABLET | Refills: 0 | Status: SHIPPED | OUTPATIENT
Start: 2025-08-20 | End: 2025-08-27

## 2025-08-19 RX ORDER — PROPOFOL 10 MG/ML
INJECTION, EMULSION INTRAVENOUS CONTINUOUS PRN
Status: DISCONTINUED | OUTPATIENT
Start: 2025-08-19 | End: 2025-08-19

## 2025-08-19 RX ORDER — LIDOCAINE 40 MG/G
CREAM TOPICAL
Status: DISCONTINUED | OUTPATIENT
Start: 2025-08-19 | End: 2025-08-19 | Stop reason: HOSPADM

## 2025-08-19 RX ORDER — LEVOFLOXACIN 5 MG/ML
INJECTION, SOLUTION INTRAVENOUS PRN
Status: DISCONTINUED | OUTPATIENT
Start: 2025-08-19 | End: 2025-08-19

## 2025-08-19 RX ORDER — OXYCODONE HYDROCHLORIDE 10 MG/1
10 TABLET ORAL
Status: DISCONTINUED | OUTPATIENT
Start: 2025-08-19 | End: 2025-08-19 | Stop reason: HOSPADM

## 2025-08-19 RX ORDER — LIDOCAINE HYDROCHLORIDE 20 MG/ML
INJECTION, SOLUTION INFILTRATION; PERINEURAL PRN
Status: DISCONTINUED | OUTPATIENT
Start: 2025-08-19 | End: 2025-08-19

## 2025-08-19 RX ORDER — SODIUM CHLORIDE, SODIUM LACTATE, POTASSIUM CHLORIDE, CALCIUM CHLORIDE 600; 310; 30; 20 MG/100ML; MG/100ML; MG/100ML; MG/100ML
INJECTION, SOLUTION INTRAVENOUS CONTINUOUS PRN
Status: DISCONTINUED | OUTPATIENT
Start: 2025-08-19 | End: 2025-08-19

## 2025-08-19 RX ORDER — NALOXONE HYDROCHLORIDE 0.4 MG/ML
0.1 INJECTION, SOLUTION INTRAMUSCULAR; INTRAVENOUS; SUBCUTANEOUS
Status: DISCONTINUED | OUTPATIENT
Start: 2025-08-19 | End: 2025-08-19 | Stop reason: HOSPADM

## 2025-08-19 RX ORDER — PROPOFOL 10 MG/ML
INJECTION, EMULSION INTRAVENOUS PRN
Status: DISCONTINUED | OUTPATIENT
Start: 2025-08-19 | End: 2025-08-19

## 2025-08-19 RX ORDER — OXYCODONE HYDROCHLORIDE 5 MG/1
5 TABLET ORAL
Status: DISCONTINUED | OUTPATIENT
Start: 2025-08-19 | End: 2025-08-19 | Stop reason: HOSPADM

## 2025-08-19 RX ADMIN — LEVOFLOXACIN 500 MG: 5 INJECTION, SOLUTION INTRAVENOUS at 09:41

## 2025-08-19 RX ADMIN — FENTANYL CITRATE 25 MCG: 50 INJECTION INTRAMUSCULAR; INTRAVENOUS at 09:10

## 2025-08-19 RX ADMIN — FENTANYL CITRATE 50 MCG: 50 INJECTION INTRAMUSCULAR; INTRAVENOUS at 09:00

## 2025-08-19 RX ADMIN — PROPOFOL 150 MCG/KG/MIN: 10 INJECTION, EMULSION INTRAVENOUS at 09:04

## 2025-08-19 RX ADMIN — PROPOFOL 60 MG: 10 INJECTION, EMULSION INTRAVENOUS at 09:04

## 2025-08-19 RX ADMIN — LIDOCAINE HYDROCHLORIDE 100 MG: 20 INJECTION, SOLUTION INFILTRATION; PERINEURAL at 09:04

## 2025-08-19 RX ADMIN — PROPOFOL 100 MCG/KG/MIN: 10 INJECTION, EMULSION INTRAVENOUS at 09:30

## 2025-08-19 RX ADMIN — SODIUM CHLORIDE, SODIUM LACTATE, POTASSIUM CHLORIDE, AND CALCIUM CHLORIDE: .6; .31; .03; .02 INJECTION, SOLUTION INTRAVENOUS at 09:03

## 2025-08-19 RX ADMIN — FENTANYL CITRATE 25 MCG: 50 INJECTION INTRAMUSCULAR; INTRAVENOUS at 09:08

## 2025-08-19 RX ADMIN — FENTANYL CITRATE 50 MCG: 50 INJECTION INTRAMUSCULAR; INTRAVENOUS at 09:51

## 2025-08-19 ASSESSMENT — ACTIVITIES OF DAILY LIVING (ADL)
ADLS_ACUITY_SCORE: 41

## 2025-08-20 LAB
PATH REPORT.COMMENTS IMP SPEC: NORMAL
PATH REPORT.FINAL DX SPEC: NORMAL
PATH REPORT.GROSS SPEC: NORMAL
PATH REPORT.MICROSCOPIC SPEC OTHER STN: NORMAL
PATH REPORT.RELEVANT HX SPEC: NORMAL

## 2025-08-21 LAB
PATH REPORT.COMMENTS IMP SPEC: ABNORMAL
PATH REPORT.COMMENTS IMP SPEC: ABNORMAL
PATH REPORT.COMMENTS IMP SPEC: YES
PATH REPORT.FINAL DX SPEC: ABNORMAL
PATH REPORT.GROSS SPEC: ABNORMAL
PATH REPORT.MICROSCOPIC SPEC OTHER STN: ABNORMAL
PATH REPORT.RELEVANT HX SPEC: ABNORMAL

## 2025-08-27 ENCOUNTER — OFFICE VISIT (OUTPATIENT)
Dept: ENDOCRINOLOGY | Facility: CLINIC | Age: 50
End: 2025-08-27
Payer: COMMERCIAL

## 2025-08-27 VITALS
WEIGHT: 239.6 LBS | OXYGEN SATURATION: 98 % | SYSTOLIC BLOOD PRESSURE: 134 MMHG | BODY MASS INDEX: 31.61 KG/M2 | DIASTOLIC BLOOD PRESSURE: 84 MMHG | HEART RATE: 75 BPM

## 2025-08-27 DIAGNOSIS — I10 HYPERTENSION, UNSPECIFIED TYPE: ICD-10-CM

## 2025-08-27 DIAGNOSIS — D3A.8 NEUROENDOCRINE TUMOR OF PANCREAS (H): Primary | ICD-10-CM

## 2025-08-27 DIAGNOSIS — E31.21 MEN 1 (MULTIPLE ENDOCRINE NEOPLASIA) (H): ICD-10-CM

## 2025-08-27 DIAGNOSIS — R82.994 HYPERCALCIURIA: ICD-10-CM

## 2025-08-27 PROCEDURE — G2211 COMPLEX E/M VISIT ADD ON: HCPCS | Performed by: INTERNAL MEDICINE

## 2025-08-27 PROCEDURE — 3075F SYST BP GE 130 - 139MM HG: CPT | Performed by: INTERNAL MEDICINE

## 2025-08-27 PROCEDURE — 99215 OFFICE O/P EST HI 40 MIN: CPT | Performed by: INTERNAL MEDICINE

## 2025-08-27 PROCEDURE — 3079F DIAST BP 80-89 MM HG: CPT | Performed by: INTERNAL MEDICINE

## 2025-08-27 RX ORDER — HYDROCHLOROTHIAZIDE 25 MG/1
25 TABLET ORAL DAILY
Qty: 90 TABLET | Refills: 3 | Status: SHIPPED | OUTPATIENT
Start: 2025-08-27

## 2025-09-04 ENCOUNTER — OFFICE VISIT (OUTPATIENT)
Dept: FAMILY MEDICINE | Facility: CLINIC | Age: 50
End: 2025-09-04
Payer: COMMERCIAL

## 2025-09-04 VITALS
WEIGHT: 235.6 LBS | SYSTOLIC BLOOD PRESSURE: 116 MMHG | RESPIRATION RATE: 16 BRPM | DIASTOLIC BLOOD PRESSURE: 77 MMHG | TEMPERATURE: 96.9 F | OXYGEN SATURATION: 97 % | BODY MASS INDEX: 31.23 KG/M2 | HEIGHT: 73 IN | HEART RATE: 71 BPM

## 2025-09-04 DIAGNOSIS — M25.512 ACUTE PAIN OF LEFT SHOULDER: Primary | ICD-10-CM

## 2025-09-04 ASSESSMENT — PAIN SCALES - GENERAL: PAINLEVEL_OUTOF10: NO PAIN (0)

## (undated) DEVICE — SOL WATER IRRIG 1000ML BOTTLE 07139-09

## (undated) RX ORDER — PROPOFOL 10 MG/ML
INJECTION, EMULSION INTRAVENOUS
Status: DISPENSED
Start: 2025-08-19

## (undated) RX ORDER — FENTANYL CITRATE 50 UG/ML
INJECTION, SOLUTION INTRAMUSCULAR; INTRAVENOUS
Status: DISPENSED
Start: 2025-08-19

## (undated) RX ORDER — FENTANYL CITRATE 50 UG/ML
INJECTION, SOLUTION INTRAMUSCULAR; INTRAVENOUS
Status: DISPENSED
Start: 2022-06-13

## (undated) RX ORDER — LEVOFLOXACIN 5 MG/ML
INJECTION, SOLUTION INTRAVENOUS
Status: DISPENSED
Start: 2025-08-19